# Patient Record
Sex: FEMALE | Race: WHITE | NOT HISPANIC OR LATINO | Employment: FULL TIME | URBAN - METROPOLITAN AREA
[De-identification: names, ages, dates, MRNs, and addresses within clinical notes are randomized per-mention and may not be internally consistent; named-entity substitution may affect disease eponyms.]

---

## 2017-11-01 ENCOUNTER — OFFICE VISIT (OUTPATIENT)
Dept: OBSTETRICS AND GYNECOLOGY | Facility: CLINIC | Age: 62
End: 2017-11-01
Payer: COMMERCIAL

## 2017-11-01 ENCOUNTER — PATIENT MESSAGE (OUTPATIENT)
Dept: GASTROENTEROLOGY | Facility: CLINIC | Age: 62
End: 2017-11-01

## 2017-11-01 VITALS
BODY MASS INDEX: 25.91 KG/M2 | HEIGHT: 66 IN | WEIGHT: 161.25 LBS | DIASTOLIC BLOOD PRESSURE: 78 MMHG | SYSTOLIC BLOOD PRESSURE: 124 MMHG

## 2017-11-01 DIAGNOSIS — Z12.11 SCREEN FOR COLON CANCER: ICD-10-CM

## 2017-11-01 DIAGNOSIS — D28.0 ANGIOKERATOMA OF VULVA: ICD-10-CM

## 2017-11-01 DIAGNOSIS — Z12.31 VISIT FOR SCREENING MAMMOGRAM: ICD-10-CM

## 2017-11-01 DIAGNOSIS — Z01.419 ENCOUNTER FOR GYNECOLOGICAL EXAMINATION: Primary | ICD-10-CM

## 2017-11-01 DIAGNOSIS — N95.0 POSTMENOPAUSAL BLEEDING: ICD-10-CM

## 2017-11-01 DIAGNOSIS — Z01.818 PREOPERATIVE EXAMINATION: ICD-10-CM

## 2017-11-01 LAB
CTP QC/QA: YES
FECAL OCCULT BLOOD, POC: NEGATIVE

## 2017-11-01 PROCEDURE — 99999 PR PBB SHADOW E&M-EST. PATIENT-LVL III: CPT | Mod: PBBFAC,,, | Performed by: OBSTETRICS & GYNECOLOGY

## 2017-11-01 PROCEDURE — 82270 OCCULT BLOOD FECES: CPT | Mod: S$GLB,,, | Performed by: OBSTETRICS & GYNECOLOGY

## 2017-11-01 PROCEDURE — 99396 PREV VISIT EST AGE 40-64: CPT | Mod: S$GLB,,, | Performed by: OBSTETRICS & GYNECOLOGY

## 2017-11-01 RX ORDER — MELOXICAM 15 MG/1
15 TABLET ORAL DAILY
COMMUNITY
End: 2018-04-26

## 2017-11-01 RX ORDER — EPINEPHRINE 0.22MG
100 AEROSOL WITH ADAPTER (ML) INHALATION DAILY
COMMUNITY

## 2017-11-01 NOTE — PROGRESS NOTES
Subjective:       Patient ID: Polina Griffin is a 61 y.o. female.    Chief Complaint:  Well Woman (last pap and HPV negative 16, last mammogram wnl 16, last DEXA Osteopenia T-score Spine: -1.1 Hip -1.8  16, last colonoscopy wnl per pt )      History of Present Illness.  Polina Griffin is a 61 y.o. female.  She has no breast or urinary symptoms.  She has no postcoital bleeding, pelvic pain or vaginal discharge.  She had bright red spotting for 2 days last week.  No previous episode.  May have come from veins on vulva per patient.  Also says she has bloating sometimes after eating.  Has IBS and takes a probiotic.    GYN & OB History  No LMP recorded. Patient is postmenopausal.   Pap: 2016 Normal HPV negative  Mammogram: 16 Normal  Colonoscopy:  Normal  DEXA: 16 spine -1.1 hip -1.8    OB History    Para Term  AB Living   2 2 1 1   2   SAB TAB Ectopic Multiple Live Births           2      # Outcome Date GA Lbr Cm/2nd Weight Sex Delivery Anes PTL Lv   2 Term 80 40w0d  3.232 kg (7 lb 2 oz) M Vag-Spont  N ESPINOZA   1  78 34w0d  1.956 kg (4 lb 5 oz) F Vag-Spont  Y ESPINOZA          Past Medical History:   Diagnosis Date    Abnormal Pap smear of cervix ,    LEEP    Anemia     in her 20's    Fibromatosis of plantar fascia     Irritable bowel syndrome (IBS)     Ocular migraine     Tinnitus     Urge incontinence      Past Surgical History:   Procedure Laterality Date    APPENDECTOMY N/A     CERVICAL BIOPSY  W/ LOOP ELECTRODE EXCISION N/A     ENDOMETRIAL BIOPSY N/A     TUBAL LIGATION Bilateral     Turbinate Reduction N/A     WISDOM TOOTH EXTRACTION Bilateral 1973     Family History   Problem Relation Age of Onset    Stroke Father     Hypertension Father     Coronary artery disease Father     Heart attack Father     Hyperlipidemia Father     Cancer Father     COPD Mother     Hyperlipidemia Mother     Hypertension Mother      Coronary artery disease Mother     Diabetes Brother     Ovarian cancer Neg Hx     Colon cancer Neg Hx     Breast cancer Neg Hx      Social History   Substance Use Topics    Smoking status: Never Smoker    Smokeless tobacco: Never Used    Alcohol use No       Current Outpatient Prescriptions:     CALCIUM PHOSPHATE TRIB/VIT D3 (CITRACAL + D3, CALCIUM PHOS, ORAL), Take by mouth., Disp: , Rfl:     coenzyme Q10 (COQ-10) 100 mg capsule, Take 100 mg by mouth once daily., Disp: , Rfl:     fluticasone (FLONASE) 50 mcg/actuation nasal spray, 1 spray by Each Nare route once daily., Disp: , Rfl:     ibuprofen (ADVIL,MOTRIN) 200 MG tablet, Take 400 mg by mouth every 6 (six) hours as needed for Pain., Disp: , Rfl:     LACTOBACILLUS ACIDOPHILUS (PROBIOTIC ORAL), Take by mouth., Disp: , Rfl:     meloxicam (MOBIC) 15 MG tablet, Take 15 mg by mouth once daily., Disp: , Rfl:     multivitamin capsule, Take 1 capsule by mouth once daily., Disp: , Rfl:     pseudoephedrine (SUDAFED) 30 MG tablet, Take 30 mg by mouth every 4 (four) hours as needed for Congestion., Disp: , Rfl:     VIT A/VIT C/VIT E/ZINC/COPPER (ICAPS AREDS ORAL), Take by mouth., Disp: , Rfl:     Review of patient's allergies indicates:   Allergen Reactions    Amoxicillin Rash       Review of Systems  Review of Systems   Constitutional: Negative for fatigue.   HENT: Negative for trouble swallowing.    Eyes: Negative for visual disturbance.   Respiratory: Negative for cough and shortness of breath.    Cardiovascular: Negative for chest pain.   Gastrointestinal: Negative for abdominal distention, abdominal pain, blood in stool, nausea and vomiting.   Genitourinary: Negative for difficulty urinating, dyspareunia, dysuria, flank pain, frequency, hematuria, pelvic pain, urgency, vaginal bleeding, vaginal discharge and vaginal pain.   Musculoskeletal: Negative for arthralgias.   Skin: Negative for rash.   Neurological: Negative for dizziness and headaches.  "  Psychiatric/Behavioral: Negative for sleep disturbance. The patient is not nervous/anxious.         Objective:     Vitals:    11/01/17 0819   BP: 124/78   Weight: 73.1 kg (161 lb 4.3 oz)   Height: 5' 6" (1.676 m)   PainSc: 0-No pain     Body mass index is 26.03 kg/m².    Physical Exam:   Constitutional: She is oriented to person, place, and time. Vital signs are normal. She appears well-developed and well-nourished.    HENT:   Head: Normocephalic.     Neck: Normal range of motion. No thyromegaly present.     Pulmonary/Chest: Right breast exhibits no mass, no nipple discharge, no skin change, no tenderness and no swelling. Left breast exhibits no mass, no nipple discharge, no skin change, no tenderness and no swelling. Breasts are symmetrical.        Abdominal: Soft. Normal appearance and bowel sounds are normal. She exhibits no distension. There is no tenderness.     Genitourinary: Rectum normal, vagina normal and uterus normal. Rectal exam shows guaiac negative stool. Guaiac negative stool. Pelvic exam was performed with patient supine. There is no rash, tenderness, lesion or injury on the right labia. There is no rash, tenderness, lesion or injury on the left labia. Cervix is normal. Right adnexum displays no mass, no tenderness and no fullness. Left adnexum displays no mass, no tenderness and no fullness. No erythema in the vagina. No vaginal discharge found. Cervix exhibits no motion tenderness and no discharge.   Genitourinary Comments: Multiple purple nodules on labia majora bilaterally           Musculoskeletal: Normal range of motion.      Lymphadenopathy:        Right: No inguinal and no supraclavicular adenopathy present.        Left: No inguinal and no supraclavicular adenopathy present.    Neurological: She is alert and oriented to person, place, and time.    Skin: Skin is warm and dry.    Psychiatric: She has a normal mood and affect.        Assessment/ Plan:     Encounter for gynecological " examination    Visit for screening mammogram  -     Mammo Digital Screening Bilat with Tomosynthesis CAD; Future; Expected date: 11/01/2017    Screen for colon cancer  -     POCT occult blood stool    Preoperative examination    Postmenopausal bleeding  -     US Pelvis Comp with Transvag NON-OB (xpd; Future; Expected date: 11/01/2017    Angiokeratoma of vulva        Routine pap smears.  Self breast exam and mammography discussed  Routine colonoscopy discussed.  Diet and exercise discussed.  Recommend calcium 1200 mg and vitamin D 600 units daily and routine bone mineral density testing.  Yearly influenza vaccination discussed.  Follow-up with me for U/S

## 2017-11-08 ENCOUNTER — TELEPHONE (OUTPATIENT)
Dept: ENDOSCOPY | Facility: HOSPITAL | Age: 62
End: 2017-11-08

## 2017-11-08 ENCOUNTER — OFFICE VISIT (OUTPATIENT)
Dept: GASTROENTEROLOGY | Facility: CLINIC | Age: 62
End: 2017-11-08
Payer: COMMERCIAL

## 2017-11-08 VITALS
HEIGHT: 66 IN | BODY MASS INDEX: 25.86 KG/M2 | SYSTOLIC BLOOD PRESSURE: 154 MMHG | WEIGHT: 160.94 LBS | DIASTOLIC BLOOD PRESSURE: 72 MMHG | HEART RATE: 87 BPM

## 2017-11-08 DIAGNOSIS — Z12.11 SCREEN FOR COLON CANCER: ICD-10-CM

## 2017-11-08 DIAGNOSIS — R14.0 ABDOMINAL BLOATING: ICD-10-CM

## 2017-11-08 DIAGNOSIS — K58.2 IRRITABLE BOWEL SYNDROME WITH BOTH CONSTIPATION AND DIARRHEA: Primary | ICD-10-CM

## 2017-11-08 DIAGNOSIS — Z12.11 SPECIAL SCREENING FOR MALIGNANT NEOPLASMS, COLON: Primary | ICD-10-CM

## 2017-11-08 DIAGNOSIS — R14.0 ABDOMINAL DISTENSION: ICD-10-CM

## 2017-11-08 PROCEDURE — 99204 OFFICE O/P NEW MOD 45 MIN: CPT | Mod: S$GLB,,, | Performed by: NURSE PRACTITIONER

## 2017-11-08 PROCEDURE — 99999 PR PBB SHADOW E&M-EST. PATIENT-LVL IV: CPT | Mod: PBBFAC,,, | Performed by: NURSE PRACTITIONER

## 2017-11-08 RX ORDER — POLYETHYLENE GLYCOL 3350, SODIUM SULFATE ANHYDROUS, SODIUM BICARBONATE, SODIUM CHLORIDE, POTASSIUM CHLORIDE 236; 22.74; 6.74; 5.86; 2.97 G/4L; G/4L; G/4L; G/4L; G/4L
4 POWDER, FOR SOLUTION ORAL ONCE
Qty: 4000 ML | Refills: 0 | Status: SHIPPED | OUTPATIENT
Start: 2017-11-08 | End: 2017-11-08

## 2017-11-08 NOTE — PATIENT INSTRUCTIONS
Try over the counter colace or miralax as needed for intermittent constipation. You may take either one daily if needed.     .

## 2017-11-08 NOTE — PROGRESS NOTES
"    [facility] Note    Reason for Visit:  The primary encounter diagnosis was Irritable bowel syndrome with both constipation and diarrhea. Diagnoses of Abdominal bloating, Abdominal distension, and Screen for colon cancer were also pertinent to this visit.    PCP:   Primary Doctor No       Referring MD:  Aaareferral Self  No address on file    HPI:  This is a 61 y.o. female here for evaluation of IBS-D  Since high school  Lately with 1 bristol type 4 or 5 BM/day. If stress or anxious (ieIf has to go to MD or social gathering)   will have 4-5 bristol type 6 BM/day-less than weekly.  Also with straining for bristol type 1 stools every few days x a few months.   Takes daily probiotics x a few years, and started a different brand (containing "digestive enzymes") x 3 months with noticeable improvement in loose, urgent stools     Abdominal pain   ONSET:since HS with worsening in the last few months  LOCATION:generalized  DURATION:intermitttent daily  CHARACTER:bloating  ASSOCIATED/ALLEVIATING/AGGRAVAITING:no n/v/fevers. + associated distention. + gas when passing hard stools. Worse after meals. Not better with BMs. Not r/t movement. Has not tried anything for it. Has to eat small meals to avoid "feeling sick" since HS.   RADIATION:none  TEMPORAL:worse immediately after meals. Lasts for a couple of hours  SEVERITY:3/10    Reflux - Hx mild acid reflux well controlled with GERD healthy lifestyle and previously taking PRN Zantac.   Dysphagia/odynophagia - no   GI bleeding - denies hematochezia, hematemesis, melena, BRBPR, black/tarry stools, and coffee ground emesis  NSAID usage - 15 mg mobic once monthly for plantar fasciitis. 400 mg BID for joint pain.     ROS:  Constitutional: No fevers, no chills, No unintentional weight loss, + fatigue-states she does not sleep well d/t snoring. Also increased vitamin D with some improvement,   ENT: No allergies  CV: No chest pain, no palpitations, no perif. edema, " no sob on exertion  Pulm: No cough, No shortness of breath, no wheezes, no sputum  Ophtho: No vision changes  GI: see HPI; also no nausea, no vomiting, no change in appetite  Derm: No rash  Heme: No lymphadenopathy, No bruising  MSK: + joint pain, no muscle pain, no muscle weakness  : No dysuria, No hematuria  Endo: No hot or cold intolerance  Neuro: No syncope, No seizure,       Medical History:  has a past medical history of Abnormal Pap smear of cervix (2011,2013); Anemia; Fibromatosis of plantar fascia; Irritable bowel syndrome (IBS); Ocular migraine; Tinnitus; and Urge incontinence.    Surgical History:  has a past surgical history that includes [place] tooth extraction (Bilateral, 1973); Tubal ligation (Bilateral, 1980); Cervical biopsy w/ loop electrode excision (N/A, 2011); Turbinate Reduction (N/A, 2015); Appendectomy (N/A, 2008); and Endometrial biopsy (N/A).    Family History: family history includes COPD in her mother; Cancer in her father; Coronary artery disease in her father and mother; Diabetes in her brother; Heart attack in her father; Hyperlipidemia in her father and mother; Hypertension in her father and mother; Stroke in her father..     Social History:  reports that she has never smoked. She has never used smokeless tobacco. She reports that she does not drink alcohol or use drugs.    Review of patient's allergies indicates:   Allergen Reactions    Amoxicillin Rash       Current Outpatient Prescriptions   Medication Sig    CALCIUM PHOSPHATE TRIB/VIT D3 (CITRACAL + D3, CALCIUM PHOS, ORAL) Take by mouth.    coenzyme Q10 (COQ-10) 100 mg capsule Take 100 mg by mouth once daily.    fluticasone (FLONASE) 50 mcg/actuation nasal spray 1 spray by Each Nare route once daily.    ibuprofen (ADVIL,MOTRIN) 200 MG tablet Take 400 mg by mouth every 6 (six) hours as needed for Pain.    LACTOBACILLUS ACIDOPHILUS (PROBIOTIC ORAL) Take by mouth.    MELATONIN (MELATIN ORAL) Take by mouth.    meloxicam  "(MOBIC) 15 MG tablet Take 15 mg by mouth once daily.    multivitamin capsule Take 1 capsule by mouth once daily.    pseudoephedrine (SUDAFED) 30 MG tablet Take 30 mg by mouth every 4 (four) hours as needed for Congestion.    VIT A/VIT C/VIT E/ZINC/COPPER (ICAPS AREDS ORAL) Take by mouth.     No current facility-administered medications for this visit.        Objective Findings:    Vital Signs:  BP (!) 154/72   Pulse 87   Ht 5' 6" (1.676 m)   Wt 73 kg (160 lb 15 oz)   BMI 25.98 kg/m²   Body mass index is 25.98 kg/m².    Physical Exam:  General Appearance: Well appearing in no acute distress  Head:   Normocephalic, without obvious abnormality  Eyes:    No scleral icterus, EOMI  ENT: Neck supple, Lips, mucosa, and tongue normal; teeth and gums normal  Lungs: CTA bilaterally in anterior and posterior fields, no wheezes, no crackles.  Heart:  Regular rate and rhythm, S1, S2 normal, no murmurs heard  Abdomen: Soft, non tender, non distended with positive bowel sounds in all four quadrants. No hepatosplenomegaly, ascites, or mass  Extremities: 2+ radial pulses, no clubbing, cyanosis or edema  Skin: No rash to exposed areas  Neurologic: A&Ox4      Labs:  No results found for: WBC, HGB, HCT, PLT, CHOL, TRIG, HDL, LDLDIRECT, ALT, AST, NA, K, CL, CREATININE, BUN, CO2, TSH, PSA, INR, GLUF, HGBA1C, MICROALBUR    Endoscopy:    Per pt, colonoscopy 10 years ago in [place]-WNL  Per pt EGD 20 years ago-WNL  Per pt, 3-4 colonoscopies prior to this and proctoscopes in high school- all normal.     Assessment:61 y.o. Female w/ long h/o IBS with worsening in the last few months after starting new probiotic w/ digestive enzymes. Increased bloating, gas, constipation x a few days weekly. Daily NSAID use. Last colonoscopy 10 years ago. Never had polyps.   1. Irritable bowel syndrome with both constipation and diarrhea    2. Abdominal bloating    3. Abdominal distension    4. Screen for colon cancer           Recommendations:  1,2,3 SIBO " send out testing. Miralax or colace PRN for constipation. EGD r/o PUD, gastritis, h. Pylori. May have to hold new probiotic and see if s/s get better. May have to consider non NSAID therapy for joint pain.  4. Screening colonoscopy ordered.     Return in about 2 months (around 1/8/2018) for ibs.      Order summary:  Orders Placed This Encounter    Case request GI: ESOPHAGOGASTRODUODENOSCOPY (EGD), COLONOSCOPY         Thank you so much for allowing me to participate in the care of Polina Kruger, APRN, FNP-C

## 2017-11-15 ENCOUNTER — APPOINTMENT (OUTPATIENT)
Dept: RADIOLOGY | Facility: OTHER | Age: 62
End: 2017-11-15
Attending: OBSTETRICS & GYNECOLOGY
Payer: COMMERCIAL

## 2017-11-15 ENCOUNTER — OFFICE VISIT (OUTPATIENT)
Dept: OBSTETRICS AND GYNECOLOGY | Facility: CLINIC | Age: 62
End: 2017-11-15
Payer: COMMERCIAL

## 2017-11-15 VITALS
BODY MASS INDEX: 26.56 KG/M2 | DIASTOLIC BLOOD PRESSURE: 76 MMHG | HEIGHT: 66 IN | SYSTOLIC BLOOD PRESSURE: 144 MMHG | WEIGHT: 165.25 LBS

## 2017-11-15 VITALS — WEIGHT: 165 LBS | HEIGHT: 66 IN | BODY MASS INDEX: 26.52 KG/M2

## 2017-11-15 DIAGNOSIS — Z12.31 VISIT FOR SCREENING MAMMOGRAM: ICD-10-CM

## 2017-11-15 DIAGNOSIS — N95.0 POSTMENOPAUSAL BLEEDING: ICD-10-CM

## 2017-11-15 PROCEDURE — 88305 TISSUE EXAM BY PATHOLOGIST: CPT | Performed by: PATHOLOGY

## 2017-11-15 PROCEDURE — 99999 PR PBB SHADOW E&M-EST. PATIENT-LVL III: CPT | Mod: PBBFAC,,, | Performed by: OBSTETRICS & GYNECOLOGY

## 2017-11-15 PROCEDURE — 58100 BIOPSY OF UTERUS LINING: CPT | Mod: S$GLB,,, | Performed by: OBSTETRICS & GYNECOLOGY

## 2017-11-15 PROCEDURE — 77067 SCR MAMMO BI INCL CAD: CPT | Mod: 26,,, | Performed by: RADIOLOGY

## 2017-11-15 PROCEDURE — 99214 OFFICE O/P EST MOD 30 MIN: CPT | Mod: 25,S$GLB,, | Performed by: OBSTETRICS & GYNECOLOGY

## 2017-11-15 PROCEDURE — 88305 TISSUE EXAM BY PATHOLOGIST: CPT | Mod: 26,,, | Performed by: PATHOLOGY

## 2017-11-15 PROCEDURE — 77063 BREAST TOMOSYNTHESIS BI: CPT | Mod: 26,,, | Performed by: RADIOLOGY

## 2017-11-15 PROCEDURE — 77067 SCR MAMMO BI INCL CAD: CPT | Mod: TC,PN

## 2017-11-15 NOTE — PROCEDURES
Procedures   Procedure:  Endometrial biopsy    Diagnosis: PMB    The risks, benefits, and alternatives were discussed prior to the procedure. Patient signed consents.      Procedure note:  The speculum was placed and betadine was applied to the cervix.  The anterior lip of the cervix was grasped with a single tooth tenaculum.  The endometrial pipelle was passed and a fair amount of tissue was obtained.  The instruments were removed. Silver nitrate was applied for hemostasis.  The specimen was sent to pathology for evaluation.  There were no complications.    Assessment and Plan:  Postmenopausal bleeding  -     US Pelvis Comp with Transvag NON-OB (xpd; Future; Expected date: 11/15/2017      Ibuprofen 600 mg every 6 hours prn cramping.  Call the office for heavy vaginal bleeding, fever, nausea, vomiting, or severe lower abdominal pain.

## 2017-11-15 NOTE — PROGRESS NOTES
Polina Griffin is a 61 y.o.  here for follow-up of postmenopausal vaginal bleeding.  She had bright red spotting for 2 days last week.  No previous episode.  She denies pelvic pain and  denies urinary symptoms.    Past Medical History:   Diagnosis Date    Abnormal Pap smear of cervix ,     LEEP    Anemia     in her 20's    Esophageal reflux     Fibroids     Fibromatosis of plantar fascia     Insomnia     Irritable bowel syndrome (IBS)     Ocular migraine     Tinnitus     Urge incontinence      Past Surgical History:   Procedure Laterality Date    APPENDECTOMY      CERVICAL BIOPSY  W/ LOOP ELECTRODE EXCISION      ENDOMETRIAL BIOPSY      NASAL TURBINATE REDUCTION  2015    TUBAL LIGATION Bilateral 1980    WISDOM TOOTH EXTRACTION Bilateral 1973     Social History   Substance Use Topics    Smoking status: Never Smoker    Smokeless tobacco: Never Used    Alcohol use No     Family History   Problem Relation Age of Onset    Stroke Father     Hypertension Father     Coronary artery disease Father     Heart attack Father     Hyperlipidemia Father     Cancer Father     Prostate cancer Father     COPD Mother     Hyperlipidemia Mother     Hypertension Mother     Coronary artery disease Mother     Diabetes Brother     Ovarian cancer Neg Hx     Colon cancer Neg Hx     Breast cancer Neg Hx     Crohn's disease Neg Hx     Ulcerative colitis Neg Hx     Stomach cancer Neg Hx     Esophageal cancer Neg Hx     Irritable bowel syndrome Neg Hx     Celiac disease Neg Hx     Rectal cancer Neg Hx      OB History    Para Term  AB Living   2 2 1 1   2   SAB TAB Ectopic Multiple Live Births           2      # Outcome Date GA Lbr Cm/2nd Weight Sex Delivery Anes PTL Lv   2 Term 80 40w0d  3.232 kg (7 lb 2 oz) M Vag-Spont EPI N ESPINOZA   1  78 34w0d  1.956 kg (4 lb 5 oz) F Vag-Spont EPI Y ESPINOZA      Obstetric Comments   Menarche 13       Review of  "Systems:  General: No fever, chills, fatigue or weight loss.  Chest: No chest pain, shortness of breath, or palpitations.  Breast: No pain, masses, or nipple discharge.  Vulva: No pain, lesions, or itching.  Vagina: No relaxation, pain, itching, or lesions.  Abdomen: No pain, nausea, vomiting, diarrhea, or constipation.  Urinary: No incontinence, nocturia, frequency, or dysuria.  Extremities:  No leg cramps, edema, or calf pain.  Neurologic: No headaches, dizziness, or visual changes.    Vitals:  Vitals:    11/15/17 1334   BP: (!) 144/76   Weight: 75 kg (165 lb 3.8 oz)   Height: 5' 6" (1.676 m)   PainSc: 0-No pain     Body mass index is 26.67 kg/m².    Physical exam:   External genitalia:  No lesions, erythema, rashes, or other abnormalities.  Urethra:  No lesions or discharge.  Vagina:  No lesions, discharge, or tenderness.   Cervix:  No lesions, discharge, or cervical motion tenderness.   Uterus:  Normal in size and shape.  Mobile and nontender.   Adnexa:  No masses or tenderness.  Transabdominal and transvaginal ultrasound of the pelvis with color flow Doppler evaluation of the ovaries:  11/15/17  COMPARISON:  05/24/2016.  FINDINGS:  Uterus measures 5.6 x 4.9 x 4.5 cm and is retroverted.  At least 3 uterine fibroids are identified, measuring 3.7 x 3.1 x 2.6 cm at the right lateral fundus, 3.6 x 3.4 x 2.9 cm at the left lateral fundus, and 1.5 x 1.4 x 0.9 cm at the anterior fundus. The endometrium is poorly visualized/partially obscured but was measured up to 7 mm in thickness.  The ovaries are normal in size and appearance.  Right ovary measures 2.0 x 1.6 x 1.0 cm.  Left ovary measures 2.1 x 1.8 x 1.0 cm. Flow to the right ovary was normal blood flow to the left ovary was difficult to characterize due to technical limitations reported by the sonographer.  There is no evidence of free fluid within the pelvis.   Impression         Bulky fibroid uterus with partial obscuration of the endometrium, suggesting the " possibility of a submucosal component in this patient with postmenopausal bleeding. Endometrium is mildly prominent at 7 mm where it could be measured.  Further evaluation with endometrial sampling could be considered based on clinical findings       Assessment and Plan:  Postmenopausal bleeding  -     US Pelvis Comp with Transvag NON-OB (xpd; Future; Expected date: 11/15/2017        Pelvic ultrasound ordered.   The endometrial stripe is 7 mm.  The risks/benefits, and alternatives were discussed.  The patient desires EMB  EMB done see note.  F/U 3 months for U/S  FOLLOW UP: PRN lack of improvement.

## 2017-12-11 ENCOUNTER — HOSPITAL ENCOUNTER (OUTPATIENT)
Facility: HOSPITAL | Age: 62
Discharge: HOME | End: 2017-12-11
Attending: INTERNAL MEDICINE | Admitting: INTERNAL MEDICINE
Payer: COMMERCIAL

## 2017-12-11 ENCOUNTER — ANESTHESIA (OUTPATIENT)
Dept: ENDOSCOPY | Facility: HOSPITAL | Age: 62
End: 2017-12-11
Payer: COMMERCIAL

## 2017-12-11 ENCOUNTER — ANESTHESIA EVENT (OUTPATIENT)
Dept: ENDOSCOPY | Facility: HOSPITAL | Age: 62
End: 2017-12-11
Payer: COMMERCIAL

## 2017-12-11 ENCOUNTER — SURGERY (OUTPATIENT)
Age: 62
End: 2017-12-11

## 2017-12-11 VITALS
BODY MASS INDEX: 25.71 KG/M2 | HEIGHT: 66 IN | RESPIRATION RATE: 18 BRPM | DIASTOLIC BLOOD PRESSURE: 70 MMHG | OXYGEN SATURATION: 100 % | TEMPERATURE: 98 F | SYSTOLIC BLOOD PRESSURE: 158 MMHG | HEART RATE: 67 BPM | WEIGHT: 160 LBS

## 2017-12-11 DIAGNOSIS — R14.0 ABDOMINAL BLOATING: Primary | ICD-10-CM

## 2017-12-11 PROCEDURE — 27201089 HC SNARE, DISP (ANY): Performed by: INTERNAL MEDICINE

## 2017-12-11 PROCEDURE — 45385 COLONOSCOPY W/LESION REMOVAL: CPT | Mod: 33,,, | Performed by: INTERNAL MEDICINE

## 2017-12-11 PROCEDURE — 88305 TISSUE EXAM BY PATHOLOGIST: CPT | Performed by: PATHOLOGY

## 2017-12-11 PROCEDURE — 63600175 PHARM REV CODE 636 W HCPCS: Performed by: NURSE ANESTHETIST, CERTIFIED REGISTERED

## 2017-12-11 PROCEDURE — C1773 RET DEV, INSERTABLE: HCPCS | Performed by: INTERNAL MEDICINE

## 2017-12-11 PROCEDURE — 25000003 PHARM REV CODE 250: Performed by: INTERNAL MEDICINE

## 2017-12-11 PROCEDURE — 27201012 HC FORCEPS, HOT/COLD, DISP: Performed by: INTERNAL MEDICINE

## 2017-12-11 PROCEDURE — 43239 EGD BIOPSY SINGLE/MULTIPLE: CPT | Mod: 51,,, | Performed by: INTERNAL MEDICINE

## 2017-12-11 PROCEDURE — 37000009 HC ANESTHESIA EA ADD 15 MINS: Performed by: INTERNAL MEDICINE

## 2017-12-11 PROCEDURE — 43239 EGD BIOPSY SINGLE/MULTIPLE: CPT | Performed by: INTERNAL MEDICINE

## 2017-12-11 PROCEDURE — 45385 COLONOSCOPY W/LESION REMOVAL: CPT | Performed by: INTERNAL MEDICINE

## 2017-12-11 PROCEDURE — D9220A PRA ANESTHESIA: Mod: 33,ANES,, | Performed by: ANESTHESIOLOGY

## 2017-12-11 PROCEDURE — 37000008 HC ANESTHESIA 1ST 15 MINUTES: Performed by: INTERNAL MEDICINE

## 2017-12-11 PROCEDURE — 88305 TISSUE EXAM BY PATHOLOGIST: CPT | Mod: 26,,, | Performed by: PATHOLOGY

## 2017-12-11 PROCEDURE — D9220A PRA ANESTHESIA: Mod: 33,CRNA,, | Performed by: NURSE ANESTHETIST, CERTIFIED REGISTERED

## 2017-12-11 RX ORDER — SODIUM CHLORIDE 9 MG/ML
INJECTION, SOLUTION INTRAVENOUS CONTINUOUS
Status: DISCONTINUED | OUTPATIENT
Start: 2017-12-11 | End: 2017-12-11 | Stop reason: HOSPADM

## 2017-12-11 RX ORDER — PROPOFOL 10 MG/ML
VIAL (ML) INTRAVENOUS CONTINUOUS PRN
Status: DISCONTINUED | OUTPATIENT
Start: 2017-12-11 | End: 2017-12-11

## 2017-12-11 RX ORDER — LIDOCAINE HCL/PF 100 MG/5ML
SYRINGE (ML) INTRAVENOUS
Status: DISCONTINUED | OUTPATIENT
Start: 2017-12-11 | End: 2017-12-11

## 2017-12-11 RX ORDER — PROPOFOL 10 MG/ML
VIAL (ML) INTRAVENOUS
Status: DISCONTINUED | OUTPATIENT
Start: 2017-12-11 | End: 2017-12-11

## 2017-12-11 RX ADMIN — PROPOFOL 200 MCG/KG/MIN: 10 INJECTION, EMULSION INTRAVENOUS at 09:12

## 2017-12-11 RX ADMIN — PROPOFOL 100 MG: 10 INJECTION, EMULSION INTRAVENOUS at 09:12

## 2017-12-11 RX ADMIN — LIDOCAINE HYDROCHLORIDE 100 MG: 20 INJECTION, SOLUTION INTRAVENOUS at 09:12

## 2017-12-11 RX ADMIN — SODIUM CHLORIDE: 0.9 INJECTION, SOLUTION INTRAVENOUS at 08:12

## 2017-12-11 NOTE — H&P
Short Stay Endoscopy History and Physical    PCP - Primary Doctor No    Procedure - EGD and Colonoscopy  Sedation: GA  ASA - per anesthesia  Mallampati - per anesthesia  History of Anesthesia problems - no  Family history Anesthesia problems -  no     HPI:  This is a 62 y.o. female here for evaluation of : Abdominal bloating, Screening for CRC    Reflux - no  Dysphagia - no  Abdominal pain - yes  Diarrhea - no    ROS:  Constitutional: No fevers, chills, No weight loss  ENT: No allergies  CV: No chest pain  Pulm: No cough, No shortness of breath  Ophtho: No vision changes  GI: see HPI  Medical History:  has a past medical history of Abnormal Pap smear of cervix (2011, 2013); Anemia; Esophageal reflux; Fibroids; Fibromatosis of plantar fascia; Insomnia; Irritable bowel syndrome (IBS); Ocular migraine; Tinnitus; and Urge incontinence.    Surgical History:  has a past surgical history that includes [place] tooth extraction (Bilateral, 1973); Tubal ligation (Bilateral, 1980); Endometrial biopsy (2010); Nasal turbinate reduction (2015); Appendectomy (2008); and Cervical biopsy w/ loop electrode excision (2011).    Family History: family history includes COPD in her mother; Cancer in her father; Coronary artery disease in her father and mother; Diabetes in her brother; Heart attack in her father; Hyperlipidemia in her father and mother; Hypertension in her father and mother; Prostate cancer in her father; Stroke in her father.. Otherwise no colon cancer, inflammatory bowel disease, or GI malignancies.    Social History:  reports that she has never smoked. She has never used smokeless tobacco. She reports that she does not drink alcohol or use drugs.    Review of patient's allergies indicates:   Allergen Reactions    Amoxicillin Rash       Medications:   Prescriptions Prior to Admission   Medication Sig Dispense Refill Last Dose    CALCIUM PHOSPHATE TRIB/VIT D3 (CITRACAL + D3, CALCIUM PHOS, ORAL) Take by mouth.   Past Week  at Unknown time    coenzyme Q10 (COQ-10) 100 mg capsule Take 100 mg by mouth once daily.   Past Week at Unknown time    fluticasone (FLONASE) 50 mcg/actuation nasal spray 1 spray by Each Nare route once daily.   Past Week at Unknown time    ibuprofen (ADVIL,MOTRIN) 200 MG tablet Take 400 mg by mouth every 6 (six) hours as needed for Pain.   Past Week at Unknown time    LACTOBACILLUS ACIDOPHILUS (PROBIOTIC ORAL) Take by mouth.   Past Week at Unknown time    MELATONIN (MELATIN ORAL) Take by mouth.   Past Week at Unknown time    multivitamin capsule Take 1 capsule by mouth once daily.   Past Week at Unknown time    pseudoephedrine (SUDAFED) 30 MG tablet Take 30 mg by mouth every 4 (four) hours as needed for Congestion.   Past Week at Unknown time    ranitidine (ZANTAC) 150 MG tablet Take 150 mg by mouth 2 (two) times daily as needed for Heartburn.   Past Week at Unknown time    vit C,F-Wh-njqtn-lutein-zeaxan (PRESERVISION AREDS 2) [id number] mg-unit-mg-mg Cap Take 1 tablet by mouth once daily.   Past Week at Unknown time    meloxicam (MOBIC) 15 MG tablet Take 15 mg by mouth once daily.   More than a month at Unknown time       Objective Findings:    Vital Signs: Per nursing notes.    Physical Exam:  General Appearance: Well appearing in no acute distress  Head:   Normocephalic, without obvious abnormality  Eyes:    No scleral icterus  Airway: Open  Neck: No restriction in mobility  Lungs: CTA bilaterally in anterior and posterior fields, no wheezes, no crackles.  Heart:  Regular rate and rhythm, S1, S2 normal, no murmurs heard  Abdomen: Soft, non tender, non distended      Labs:  No results found for: WBC, HGB, HCT, PLT, CHOL, TRIG, HDL, LDLDIRECT, ALT, AST, NA, K, CL, CREATININE, BUN, CO2, TSH, PSA, INR, GLUF, HGBA1C, MICROALBUR      I have explained the risks and benefits of endoscopy procedures to the patient including but not limited to bleeding, perforation, infection, and death.    Thank you so much  for allowing me to participate in the care of Polina Florez MD

## 2017-12-11 NOTE — ANESTHESIA POSTPROCEDURE EVALUATION
"Anesthesia Post Evaluation    Patient: Polina Griffin    Procedure(s) Performed: Procedure(s) (LRB):  ESOPHAGOGASTRODUODENOSCOPY (EGD) (N/A)  COLONOSCOPY (N/A)    Final Anesthesia Type: general  Patient location during evaluation: PACU  Patient participation: Yes- Able to Participate  Level of consciousness: awake and alert  Post-procedure vital signs: reviewed and stable  Pain management: adequate  Airway patency: patent  PONV status at discharge: No PONV  Anesthetic complications: no      Cardiovascular status: blood pressure returned to baseline  Respiratory status: unassisted  Hydration status: euvolemic  Follow-up not needed.        Visit Vitals  BP (!) 158/70 (BP Location: Left arm, Patient Position: Lying)   Pulse 67   Temp 36.7 °C (98.1 °F) (Temporal)   Resp 18   Ht 5' 6" (1.676 m)   Wt 72.6 kg (160 lb)   LMP  (LMP Unknown)   SpO2 100%   Breastfeeding? No   BMI 25.82 kg/m²       Pain/Stephanie Score: Pain Assessment Performed: Yes (12/11/2017  9:56 AM)  Presence of Pain: non-verbal indicators absent (12/11/2017  9:56 AM)  Stephanie Score: 10 (12/11/2017  9:56 AM)      "

## 2017-12-11 NOTE — ANESTHESIA PREPROCEDURE EVALUATION
12/11/2017  Polina Griffin is a 62 y.o., female.    Anesthesia Evaluation         Review of Systems  Anesthesia Hx:  No problems with previous Anesthesia   Social:  Non-Smoker    Cardiovascular:   Exercise tolerance: good Denies Hypertension.  Denies CAD.     Denies Angina.  Functional Capacity Can you climb two flights of stairs? ==> Yes    Pulmonary:   Denies Asthma.  Denies Recent URI.  Denies Sleep Apnea.    Renal/:  Renal/ Normal     Hepatic/GI:   Denies PUD. Denies Hiatal Hernia. GERD Denies Liver Disease.  Denies Hepatitis.    Neurological:   Denies CVA. Denies Seizures.    Endocrine:   Denies Diabetes. Denies Hypothyroidism.        Physical Exam  General:  Well nourished    Airway/Jaw/Neck:  Airway Findings: Mouth Opening: Normal Tongue: Normal  General Airway Assessment: Adult  Mallampati: I  TM Distance: 4 - 6 cm  Jaw/Neck Findings:  Neck ROM: Normal ROM  Neck Findings:     Eyes/Ears/Nose:  EYES/EARS/NOSE FINDINGS: Normal   Dental:  Dental Findings: In tact   Chest/Lungs:  Chest/Lungs Findings: Clear to auscultation     Heart/Vascular:  Heart Findings: Rate: Normal  Rhythm: Regular Rhythm  Sounds: Normal        Mental Status:  Mental Status Findings:  Alert and Oriented         Anesthesia Plan  Type of Anesthesia, risks & benefits discussed:  Anesthesia Type:  general, MAC  Patient's Preference: Proceed with anesthesia understanding that the risks are very small but could be serious or life threatening.  Intra-op Monitoring Plan: standard ASA monitors  Intra-op Monitoring Plan Comments:   Post Op Pain Control Plan:   Post Op Pain Control Plan Comments:   Induction:   IV  Beta Blocker:  Patient is not currently on a Beta-Blocker (No further documentation required).       Informed Consent: Patient understands risks and agrees with Anesthesia plan.  Questions answered. Anesthesia consent signed with  patient.  ASA Score: 2     Day of Surgery Review of History & Physical: I have interviewed and examined the patient. I have reviewed the patient's H&P dated:            Ready For Surgery From Anesthesia Perspective.

## 2017-12-11 NOTE — PATIENT INSTRUCTIONS
Discharge Summary/Instructions after an Endoscopic Procedure  Patient Name: Polina Griffin  Patient MRN: [id number]  Patient YOB: 1955 Monday, December 11, 2017  Angel Florez MD  RESTRICTIONS:  During your procedure today, you received medications for sedation.  These   medications may affect your judgment, balance and coordination.  Therefore,   for 24 hours, you have the following restrictions:   - DO NOT drive a car, operate machinery, make legal/financial decisions,   sign important papers or drink alcohol.    ACTIVITY:  The following day: return to full activity including work, except no heavy   lifting, straining or running for 3 days if polyps were removed.  DIET:  Eat and drink normally unless instructed otherwise.     TREATMENT FOR COMMON SIDE EFFECTS:  - Mild abdominal pain, belching, bloating or excessive gas: rest, eat   lightly and use a heating pad.  - Sore Throat: treat with throat lozenges and/or gargle with warm salt   water.  SYMPTOMS TO WATCH FOR AND REPORT TO YOUR PHYSICIAN:  1. Abdominal pain or bloating, other than gas cramps.  2. Chest pain.  3. Back pain.  4. Chills or fever occurring within 24 hours after the procedure.  5. Rectal bleeding, which would show as bright red, maroon, or black stools.   (A tablespoon of blood from the rectum is not serious, especially if   hemorrhoids are present.)  6. Vomiting.  7. Weakness or dizziness.  8. Because air was used during the procedure, expelling large amounts of air   from your rectum or belching is normal.  9. If a bowel prep was taken, you may not have a bowel movement for 1-3   days.  This is normal.  GO DIRECTLY TO THE NEAREST EMERGENCY ROOM IF YOU HAVE ANY OF THE FOLLOWING:      Difficulty breathing  Chills and/or fever over 101 F   Persistent vomiting and/or vomiting blood   Severe abdominal pain   Severe chest pain   Black, tarry stools   Bleeding- more than one tablespoon   Any other symptom or condition that you may feel  needs urgent attention  Your doctor recommends these additional instructions:  If any biopsies were taken, your doctor s clinic will contact you in 1 to 2   weeks with any results.  You have a contact number available for emergencies.  The signs and symptoms   of potential delayed complications were discussed with you.  You may return   to normal activities tomorrow.  Written discharge instructions were   provided to you.   You are being discharged to home.   Resume your previous diet.   Continue your present medications.   We are waiting for your pathology results.  For questions, problems or results please call your physician - Angel Florez MD at Work:  [phone].  [facility], EMERGENCY ROOM PHONE NUMBER: [phone]  IF A COMPLICATION OR EMERGENCY SITUATION ARISES AND YOU ARE UNABLE TO REACH   YOUR PHYSICIAN - GO DIRECTLY TO THE EMERGENCY ROOM.  Angel Florez MD  12/11/2017 9:30:14 AM  This report has been verified and signed electronically.

## 2017-12-11 NOTE — TRANSFER OF CARE
"Anesthesia Transfer of Care Note    Patient: Polina Griffin    Procedure(s) Performed: Procedure(s) (LRB):  ESOPHAGOGASTRODUODENOSCOPY (EGD) (N/A)  COLONOSCOPY (N/A)    Patient location: PACU    Anesthesia Type: general    Transport from OR: Transported from OR on room air with adequate spontaneous ventilation    Post pain: adequate analgesia    Post assessment: no apparent anesthetic complications    Post vital signs: stable    Level of consciousness: awake    Nausea/Vomiting: no nausea/vomiting    Complications: none    Transfer of care protocol was followed      Last vitals:   Visit Vitals  /60 (BP Location: Left arm, Patient Position: Lying)   Pulse 77   Temp 36.7 °C (98.1 °F) (Temporal)   Resp 18   Ht 5' 6" (1.676 m)   Wt 72.6 kg (160 lb)   LMP  (LMP Unknown)   SpO2 98%   Breastfeeding? No   BMI 25.82 kg/m²     "

## 2017-12-11 NOTE — PATIENT INSTRUCTIONS
Discharge Summary/Instructions after an Endoscopic Procedure  Patient Name: Polina Griffin  Patient MRN: [id number]  Patient YOB: 1955 Monday, December 11, 2017  Angel Florez MD  RESTRICTIONS:  During your procedure today, you received medications for sedation.  These   medications may affect your judgment, balance and coordination.  Therefore,   for 24 hours, you have the following restrictions:   - DO NOT drive a car, operate machinery, make legal/financial decisions,   sign important papers or drink alcohol.    ACTIVITY:  The following day: return to full activity including work, except no heavy   lifting, straining or running for 3 days if polyps were removed.  DIET:  Eat and drink normally unless instructed otherwise.     TREATMENT FOR COMMON SIDE EFFECTS:  - Mild abdominal pain, belching, bloating or excessive gas: rest, eat   lightly and use a heating pad.  - Sore Throat: treat with throat lozenges and/or gargle with warm salt   water.  SYMPTOMS TO WATCH FOR AND REPORT TO YOUR PHYSICIAN:  1. Abdominal pain or bloating, other than gas cramps.  2. Chest pain.  3. Back pain.  4. Chills or fever occurring within 24 hours after the procedure.  5. Rectal bleeding, which would show as bright red, maroon, or black stools.   (A tablespoon of blood from the rectum is not serious, especially if   hemorrhoids are present.)  6. Vomiting.  7. Weakness or dizziness.  8. Because air was used during the procedure, expelling large amounts of air   from your rectum or belching is normal.  9. If a bowel prep was taken, you may not have a bowel movement for 1-3   days.  This is normal.  GO DIRECTLY TO THE NEAREST EMERGENCY ROOM IF YOU HAVE ANY OF THE FOLLOWING:      Difficulty breathing  Chills and/or fever over 101 F   Persistent vomiting and/or vomiting blood   Severe abdominal pain   Severe chest pain   Black, tarry stools   Bleeding- more than one tablespoon   Any other symptom or condition that you may feel  needs urgent attention  Your doctor recommends these additional instructions:  If any biopsies were taken, your doctor s clinic will contact you in 1 to 2   weeks with any results.  You have a contact number available for emergencies.  The signs and symptoms   of potential delayed complications were discussed with you.  You may return   to normal activities tomorrow.  Written discharge instructions were   provided to you.   You are being discharged to home.   Resume your previous diet.   Continue your present medications.   We are waiting for your pathology results.   Your physician has recommended a repeat colonoscopy in five years for   surveillance.  For questions, problems or results please call your physician - Angel Florez MD at Work:  [phone].  [facility], EMERGENCY ROOM PHONE NUMBER: [phone]  IF A COMPLICATION OR EMERGENCY SITUATION ARISES AND YOU ARE UNABLE TO REACH   YOUR PHYSICIAN - GO DIRECTLY TO THE EMERGENCY ROOM.  Angel Florez MD  12/11/2017 9:47:31 AM  This report has been verified and signed electronically.

## 2017-12-13 ENCOUNTER — TELEPHONE (OUTPATIENT)
Dept: GASTROENTEROLOGY | Facility: CLINIC | Age: 62
End: 2017-12-13

## 2017-12-14 ENCOUNTER — TELEPHONE (OUTPATIENT)
Dept: GASTROENTEROLOGY | Facility: CLINIC | Age: 62
End: 2017-12-14

## 2017-12-14 NOTE — TELEPHONE ENCOUNTER
----- Message from Angel Florez MD sent at 12/14/2017  8:46 AM CST -----  Biopsies of the stomach and small bowel are normal and the colon polyp was benign. Follow up with Brittney as previously scheduled.

## 2017-12-14 NOTE — PROGRESS NOTES
Biopsies of the stomach and small bowel are normal and the colon polyp was benign. Follow up with Brittney as previously scheduled.

## 2017-12-15 ENCOUNTER — TELEPHONE (OUTPATIENT)
Dept: GASTROENTEROLOGY | Facility: CLINIC | Age: 62
End: 2017-12-15

## 2017-12-15 NOTE — TELEPHONE ENCOUNTER
----- Message from Laura Vance MA sent at 12/14/2017  9:01 AM CST -----  Contact: Millie with [facility] [phone]  [place]  Returning a call to Tawana re: the pt's breath test. It was not until I went back to the line that she told me they do not have them and asked I relay that message. Please call Millie with [facility] [phone]

## 2017-12-15 NOTE — TELEPHONE ENCOUNTER
Spoke to pt  Did not perform breath test yet  Pt says she may not do it until after the 1st of the year

## 2017-12-18 ENCOUNTER — TELEPHONE (OUTPATIENT)
Dept: ENDOSCOPY | Facility: HOSPITAL | Age: 62
End: 2017-12-18

## 2018-04-05 ENCOUNTER — OFFICE VISIT (OUTPATIENT)
Dept: INTERNAL MEDICINE | Facility: CLINIC | Age: 63
End: 2018-04-05
Payer: COMMERCIAL

## 2018-04-05 ENCOUNTER — HOSPITAL ENCOUNTER (OUTPATIENT)
Dept: RADIOLOGY | Facility: HOSPITAL | Age: 63
Discharge: HOME | End: 2018-04-05
Attending: INTERNAL MEDICINE
Payer: COMMERCIAL

## 2018-04-05 ENCOUNTER — PATIENT MESSAGE (OUTPATIENT)
Dept: INTERNAL MEDICINE | Facility: CLINIC | Age: 63
End: 2018-04-05

## 2018-04-05 VITALS
HEART RATE: 66 BPM | DIASTOLIC BLOOD PRESSURE: 80 MMHG | WEIGHT: 165 LBS | OXYGEN SATURATION: 98 % | BODY MASS INDEX: 26.52 KG/M2 | HEIGHT: 66 IN | TEMPERATURE: 99 F | SYSTOLIC BLOOD PRESSURE: 126 MMHG

## 2018-04-05 DIAGNOSIS — R07.9 CHEST PAIN, UNSPECIFIED TYPE: ICD-10-CM

## 2018-04-05 DIAGNOSIS — R07.9 CHEST PAIN, UNSPECIFIED TYPE: Primary | ICD-10-CM

## 2018-04-05 DIAGNOSIS — K58.9 IRRITABLE BOWEL SYNDROME, UNSPECIFIED TYPE: ICD-10-CM

## 2018-04-05 PROBLEM — R14.0 ABDOMINAL BLOATING: Status: RESOLVED | Noted: 2017-12-11 | Resolved: 2018-04-05

## 2018-04-05 PROCEDURE — 3074F SYST BP LT 130 MM HG: CPT | Mod: CPTII,S$GLB,, | Performed by: INTERNAL MEDICINE

## 2018-04-05 PROCEDURE — 71046 X-RAY EXAM CHEST 2 VIEWS: CPT | Mod: TC

## 2018-04-05 PROCEDURE — 99203 OFFICE O/P NEW LOW 30 MIN: CPT | Mod: S$GLB,,, | Performed by: INTERNAL MEDICINE

## 2018-04-05 PROCEDURE — 3079F DIAST BP 80-89 MM HG: CPT | Mod: CPTII,S$GLB,, | Performed by: INTERNAL MEDICINE

## 2018-04-05 PROCEDURE — 71046 X-RAY EXAM CHEST 2 VIEWS: CPT | Mod: 26,,, | Performed by: RADIOLOGY

## 2018-04-05 PROCEDURE — 99999 PR PBB SHADOW E&M-EST. PATIENT-LVL V: CPT | Mod: PBBFAC,,, | Performed by: INTERNAL MEDICINE

## 2018-04-08 NOTE — PROGRESS NOTES
Subjective:       Patient ID: Polina Griffin is a 62 y.o. female.    Chief Complaint: Establish Care    HPI  She is here for an initial visit.  She complains of occasional episodes of pain located in her chest.  No shortness of breath.  No palpitations.  Currently asymptomatic.  3 out of 10 in intensity.  Described as an ache.  Has been present intermittently for over a week.    Past medical history: Abnormal Pap smear, anemia, status post appendectomy, tubal ligation.  She had a colonoscopy December 2017    Medications: None    ALLERGIES: Penicillin    Family history: Coronary artery disease, father had prostate cancer      Review of Systems   Constitutional: Negative for chills, fatigue, fever and unexpected weight change.   Respiratory: Negative for chest tightness and shortness of breath.    Cardiovascular: Negative for chest pain and palpitations.   Gastrointestinal: Negative for abdominal pain and blood in stool.   Neurological: Negative for dizziness, syncope, numbness and headaches.       Objective:      Physical Exam   HENT:   Right Ear: External ear normal.   Left Ear: External ear normal.   Nose: Nose normal.   Mouth/Throat: Oropharynx is clear and moist.   Eyes: Pupils are equal, round, and reactive to light.   Neck: Normal range of motion.   Cardiovascular: Normal rate and regular rhythm.    No murmur heard.  Pulmonary/Chest: Breath sounds normal.   Abdominal: She exhibits no distension. There is no hepatosplenomegaly. There is no tenderness.   Lymphadenopathy:     She has no cervical adenopathy.     She has no axillary adenopathy.   Neurological: She has normal strength and normal reflexes. No cranial nerve deficit or sensory deficit.       Assessment/Plan     assessment and plan: Chest pain: Check chest x-ray and stress echo.  Advised her to call immediately if symptom recurs.  She reports blood pressures elevated at home.  Recommended low-sodium diet.  Return to clinic in 1 month blood pressure check.   Check CMP, lipid panel, CBC, TSH

## 2018-04-12 ENCOUNTER — HOSPITAL ENCOUNTER (OUTPATIENT)
Dept: CARDIOLOGY | Facility: CLINIC | Age: 63
Discharge: HOME | End: 2018-04-12
Attending: INTERNAL MEDICINE
Payer: COMMERCIAL

## 2018-04-12 DIAGNOSIS — R07.9 CHEST PAIN, UNSPECIFIED TYPE: ICD-10-CM

## 2018-04-12 LAB — RETIRED EF AND QEF - SEE NOTES: 50 (ref 55–65)

## 2018-04-12 PROCEDURE — 93351 STRESS TTE COMPLETE: CPT | Mod: S$GLB,,, | Performed by: INTERNAL MEDICINE

## 2018-04-12 PROCEDURE — 93321 DOPPLER ECHO F-UP/LMTD STD: CPT | Mod: S$GLB,,, | Performed by: INTERNAL MEDICINE

## 2018-04-14 ENCOUNTER — TELEPHONE (OUTPATIENT)
Dept: INTERNAL MEDICINE | Facility: CLINIC | Age: 63
End: 2018-04-14

## 2018-04-14 DIAGNOSIS — R94.39 ABNORMAL STRESS TEST: Primary | ICD-10-CM

## 2018-04-26 ENCOUNTER — OFFICE VISIT (OUTPATIENT)
Dept: GASTROENTEROLOGY | Facility: CLINIC | Age: 63
End: 2018-04-26
Payer: COMMERCIAL

## 2018-04-26 VITALS
BODY MASS INDEX: 26.36 KG/M2 | SYSTOLIC BLOOD PRESSURE: 155 MMHG | HEIGHT: 66 IN | DIASTOLIC BLOOD PRESSURE: 75 MMHG | HEART RATE: 71 BPM | WEIGHT: 164 LBS

## 2018-04-26 DIAGNOSIS — K58.0 IRRITABLE BOWEL SYNDROME WITH DIARRHEA: Primary | ICD-10-CM

## 2018-04-26 PROCEDURE — 3077F SYST BP >= 140 MM HG: CPT | Mod: CPTII,S$GLB,, | Performed by: NURSE PRACTITIONER

## 2018-04-26 PROCEDURE — 3078F DIAST BP <80 MM HG: CPT | Mod: CPTII,S$GLB,, | Performed by: NURSE PRACTITIONER

## 2018-04-26 PROCEDURE — 99999 PR PBB SHADOW E&M-EST. PATIENT-LVL IV: CPT | Mod: PBBFAC,,, | Performed by: NURSE PRACTITIONER

## 2018-04-26 PROCEDURE — 99214 OFFICE O/P EST MOD 30 MIN: CPT | Mod: S$GLB,,, | Performed by: NURSE PRACTITIONER

## 2018-04-26 RX ORDER — KETOTIFEN FUMARATE 0.35 MG/ML
1 SOLUTION/ DROPS OPHTHALMIC 2 TIMES DAILY
COMMUNITY
End: 2021-10-20

## 2018-04-26 RX ORDER — DICYCLOMINE HYDROCHLORIDE 10 MG/1
10 CAPSULE ORAL
Qty: 90 CAPSULE | Refills: 3 | Status: SHIPPED | OUTPATIENT
Start: 2018-04-26 | End: 2018-12-28 | Stop reason: SDUPTHER

## 2018-04-26 RX ORDER — BISACODYL 5 MG
5 TABLET, DELAYED RELEASE (ENTERIC COATED) ORAL DAILY PRN
COMMUNITY
End: 2020-12-02

## 2018-04-26 NOTE — LETTER
April 26, 2018      Katja Reynolds MD  [address]  [place] [address]           [facility] - Gastroenterology  [address]  [place] [address]  Phone: [phone]  Fax: [phone]          Patient: Polina Griffin   MR Number: [id number]   YOB: 1955   Date of Visit: 4/26/2018       Dear Dr. Katja Reynolds:    Thank you for referring Polina Griffin to me for evaluation. Attached you will find relevant portions of my assessment and plan of care.    If you have questions, please do not hesitate to call me. I look forward to following Polina Griffin along with you.    Sincerely,    Brittney Kruger, TERESE    Enclosure  CC:  No Recipients    If you would like to receive this communication electronically, please contact [email or web address] or [phone] to request more information on [facility] access.    For providers and/or their staff who would like to refer a patient to [facility], please contact us through our one-stop-shop provider referral line, [facility], at [phone].    If you feel you have received this communication in error or would no longer like to receive these types of communications, please e-mail [email or web address]

## 2018-04-26 NOTE — PROGRESS NOTES
[facility] Note    Reason for Visit:  The encounter diagnosis was Irritable bowel syndrome with diarrhea.    PCP:   Katja Reynolds       Referring MD:  Katja Reynolds Md  [address] [place] [address]    HPI:  This is a 62 y.o. female here for f/u evaluation of IBS-D  Since high school    Bowel habits: 1 bristol type 3 or 4 BM most days of the week. If stressed or anxious (ie If has to go to MD or social gathering)  will have 4-5 bristol type 4-5 BM/day occurring once per week.  Also with straining for bristol type 2 stools once weekly. Takes dulcolease periodically. Takes daily probiotic.    Abdominal pain -upper abd bloating with distention is still problematic.   ONSET:since HS with worsening last year  LOCATION:generalized  DURATION:intermitttent daily  CHARACTER:bloating  ASSOCIATED/ALLEVIATING/AGGRAVAITING:no n/v/fevers. + associated distention. + gas when passing hard stools. Worse after meals. Not better with BMs. Not r/t movement. Has not completed breath test for SIBO yet.   RADIATION:none  TEMPORAL:worse immediately after meals. Lasts for a couple of hours  SEVERITY:3/10    Reflux - Hx mild acid reflux/regurgitation well controlled with GERD healthy lifestyle and  PRN Zantac.   Dysphagia/odynophagia - no   GI bleeding - denies hematochezia, hematemesis, melena, BRBPR, black/tarry stools, and coffee ground emesis  NSAID usage - no longer taking 15 mg mobic once monthly for plantar fasciitis. Still taking ibuprofen 400 mg BID for joint pain.     ROS:  Constitutional: No fevers, no chills, No unintentional weight loss, + fatigue   ENT: No allergies  CV: No chest pain, no palpitations, no perif. edema, no sob on exertion  Pulm: No cough, No shortness of breath, no wheezes, no sputum  Ophtho: No vision changes  GI: see HPI; also no nausea, no vomiting, no change in appetite  Derm: No rash  Heme: No lymphadenopathy, No bruising  MSK: + joint pain, no muscle pain, no  muscle weakness  : No dysuria, No hematuria  Endo: No hot or cold intolerance  Neuro: No syncope, No seizure,       Medical History:  has a past medical history of Abnormal Pap smear of cervix (2011, 2013); Anemia; Esophageal reflux; Fibroids; Fibromatosis of plantar fascia; Insomnia; Irritable bowel syndrome (IBS); Ocular migraine; Tinnitus; and Urge incontinence.    Surgical History:  has a past surgical history that includes [place] tooth extraction (Bilateral, 1973); Tubal ligation (Bilateral, 1980); Endometrial biopsy (2010); Nasal turbinate reduction (2015); Appendectomy (2008); Cervical biopsy w/ loop electrode excision (2011); and Colonoscopy (N/A, 12/11/2017).    Family History: family history includes COPD in her mother; Cancer in her father; Coronary artery disease in her father and mother; Diabetes in her brother; Heart attack in her father; Hyperlipidemia in her father and mother; Hypertension in her father and mother; Prostate cancer in her father; Stroke in her father..     Social History:  reports that she has never smoked. She has never used smokeless tobacco. She reports that she does not drink alcohol or use drugs.    Review of patient's allergies indicates:   Allergen Reactions    Amoxicillin Rash       Current Outpatient Prescriptions   Medication Sig    bisacodyl (DULCOLAX) 5 mg EC tablet Take 5 mg by mouth daily as needed for Constipation.    CALCIUM PHOSPHATE TRIB/VIT D3 (CITRACAL + D3, CALCIUM PHOS, ORAL) Take by mouth.    coenzyme Q10 (COQ-10) 100 mg capsule Take 100 mg by mouth once daily.    fluticasone (FLONASE) 50 mcg/actuation nasal spray 1 spray by Each Nare route once daily.    ketotifen (ZADITOR) 0.025 % (0.035 %) ophthalmic solution 1 drop 2 (two) times daily.    LACTOBACILLUS ACIDOPHILUS (PROBIOTIC ORAL) Take by mouth.    MELATONIN (MELATIN ORAL) Take by mouth.    multivitamin capsule Take 1 capsule by mouth once daily.    ranitidine (ZANTAC) 150 MG tablet Take 150 mg  "by mouth 2 (two) times daily as needed for Heartburn.    vit C,S-Mj-ynyig-lutein-zeaxan (PRESERVISION AREDS 2) 655-546-94-1 mg-unit-mg-mg Cap Take 1 tablet by mouth once daily.    dicyclomine (BENTYL) 10 MG capsule Take 1 capsule (10 mg total) by mouth 4 (four) times daily before meals and nightly. As needed     No current facility-administered medications for this visit.        Objective Findings:    Vital Signs:  BP (!) 155/75   Pulse 71   Ht 5' 6" (1.676 m)   Wt 74.4 kg (164 lb 0.4 oz)   LMP  (LMP Unknown)   BMI 26.47 kg/m²   Body mass index is 26.47 kg/m².    Physical Exam:  General Appearance: Well appearing in no acute distress  Head:   Normocephalic, without obvious abnormality  Eyes:    No scleral icterus, EOMI  ENT: Neck supple, Lips, mucosa, and tongue normal; teeth and gums normal  Lungs: CTA bilaterally in anterior and posterior fields, no wheezes, no crackles.  Heart:  Regular rate and rhythm, S1, S2 normal, no murmurs heard  Abdomen: Soft, non tender, non distended with positive bowel sounds in all four quadrants. No hepatosplenomegaly, ascites, or mass  Extremities: 2+ radial pulses, no clubbing, cyanosis or edema  Skin: No rash to exposed areas  Neurologic: A&Ox4      Labs:  Lab Results   Component Value Date    WBC 5.97 04/12/2018    HGB 13.3 04/12/2018    HCT 41.7 04/12/2018     04/12/2018    CHOL 176 04/12/2018    TRIG 137 04/12/2018    HDL 46 04/12/2018    ALT 16 04/12/2018    AST 21 04/12/2018     (H) 04/12/2018    K 4.1 04/12/2018     04/12/2018    CREATININE 0.7 04/12/2018    BUN 14 04/12/2018    CO2 30 (H) 04/12/2018    TSH 2.186 04/12/2018       Endoscopy:    12/11/2017EGD- small HH. Nl stomach (-), nl duodenum.   12/11/2017Colon- GPTC. Sigmoid and descending tics. 2 mm rectal polyp (-). Rpt 5 yrs.  Per pt, colonoscopy 10 years ago in [place]-WNL  Per pt EGD 20 years ago-WNL  Per pt, 3-4 colonoscopies prior to this and proctoscopes in high school- all normal. "     Assessment  1. Irritable bowel syndrome with diarrhea           Recommendations:  1. IBS-D-  Bentyl PRN. Miralax PRN. Send out SIBO testing. Eliminate lactose and sugar substitutes as per written instructions. Avoid nsaids as per handout provided. Discussed the role of mindfulness and meditation in management of functional GI disorders.  Provided handout.      Follow-up in about 3 months (around 7/26/2018) for IBS-D.      Order summary:  Orders Placed This Encounter    dicyclomine (BENTYL) 10 MG capsule         Thank you so much for allowing me to participate in the care of Polina Kruger, APRN, FNP-C

## 2018-04-26 NOTE — PATIENT INSTRUCTIONS
Eliminate all forms of dairy/lactose (milk, cheese, butter, creamers, ice cream etc) and artificial sweeteners (splenda, equal, stevia, truvia, crystal lite, diet sodas, sugar free candy/gum etc) from your diet for 10 days to see if your symptoms improve.             What to Eat and What Not to Eat (to reduce bloating and gas)    Avoid drinking from straws  Avoid eating excessively fast  Avoid eating excessively slow  Avoid gum chewing           Limit:  Beans, broccoli, cabbage  Caffeine (1 small cup of coffee or tea a day)  Fatty meals  Meat  Processed foods (if it comes in a box, has more than 10 listed ingredients, has an expiration date)  Salt    NSAIDS are Non Steroidal Anti Inflammatory Drugs taken for pain.   You should stay away from these medications (unless they are advised due to cardiac health issues) as they may cause irritation and/or ulceration in the lining of your stomach.  Ask your primary care physician for an alternative medication.    Powders - BC Powder, Goody powder, Stanback    Ibuprofen which is also known as : Advil, Advil Childrens, Advil Jamar Strength, Advil Liquigel, Advil Migraine, Advil Pediatric, Childrens Ibuprofen Berry, Genpril, IBU, Midol IB, Midol Maximum Strength Cramp Formula, Dolgesic, Motrin Childrens, Motrin IB, Motrin Infant Drops, Motrin Jamar Strength, Motrin Migraine Pain, Nuprin, Migraine Liqui-gels, Ibu-Tab 200, Cap-Profen, Tab-Profen, Profen, Ibuprohm, Childrens Elixsure, IB Pro, Vicoprofen, Combunox, A-G Profen, Actiprofen, Addaprin, Advil Infants Concentrated Drops, Caldolor, Haltran, Q-Profen, Ibifon 600, Ibren, Menadol, Midol Cramps & Bodyaches, Rufen, Saleto-200, Roderick, Ultraprin, Uni-Pro, Wal-Profen., Famotidine and Ibuprofen can be found as : Duexis.    Aspirin which has the brand name : Arthritis Pain, Aspergum, Aspir-Low, Aspirin Lite Coat, [place] Aspirin, Bufferin, Easprin, Ecotrin, Empirin, Fasprin, Genacote, Halfprin, [place] Aspirin, [place]  Aspirin, Stanback Analgesic, Tri-Buffered Aspirin, YSP Aspirin, Zorprin.    Ketoprofen which is  known as : Orudis KT, Oruvail, Actron.    Sulindac which is sold as : Clinoril.    Naproxen is one of the most known drug from NSAIDs list, and is sold as : Aleve, Naprosyn, EC-Naprosyn, Naprelan, Anaprox, All Day Pain Relief, Aflaxen, All Day Relief, Anaprox-DS, Comfort Pac  With Naproxen,  Aleve Caplet, Aleve Easy Open Arthritis, Aleve Gelcap,  Anaprox-DS, EC-Naprosyn, Leader Naproxen Sodium, Midol Extended Relief, Naprelan 375?, Naprelan 500?, Naprelan 750?, Prevacid NapraPac 375,  Prevacid NapraPac, Naprapac, Vimovo.    Etodolac is sold under the brand name : Lodine XL, Lodine.  Fenoprofen which can be found on the market as : Nalfon, Nalfon 200.  Diclofenac which is sold as : Arthrotec, Cataflam, Voltaren, Cambia, Voltaren-XR, Zipsor.  Flurbiprofen sold as : Asaid.  Ketorolac is sold under the brand name : Sprix, Toradol, Toradol IM, Toradol IV/IM.  Piroxicam is found on the market as : Feldene.  Indomethacin which is known as : Indocin SR, Indocin, Indo-[place], Indomethegan, Indocin IV.  Mefenamic Acid sold as : Ponstel.  Meloxicam is sold under the brand name : Mobic.  Nabumetone which is sold as : Relafen.  Oxaprozin which has the brand name : Daypro.  Ketoprofen which has the brand name : Actron, Orudis KT, Orudis, Oruvail.  Meclofenamate sold as : Meclomen.  Tolmetin which can be found on the marked as : Tolectin, Tolectin 600, Tolectin DS.  Salsalate which is sold as : Disalcid.

## 2018-04-27 ENCOUNTER — PATIENT MESSAGE (OUTPATIENT)
Dept: GASTROENTEROLOGY | Facility: CLINIC | Age: 63
End: 2018-04-27

## 2018-05-03 ENCOUNTER — OFFICE VISIT (OUTPATIENT)
Dept: CARDIOLOGY | Facility: CLINIC | Age: 63
End: 2018-05-03
Payer: COMMERCIAL

## 2018-05-03 VITALS
HEART RATE: 78 BPM | HEIGHT: 66 IN | BODY MASS INDEX: 26.5 KG/M2 | DIASTOLIC BLOOD PRESSURE: 60 MMHG | SYSTOLIC BLOOD PRESSURE: 120 MMHG | WEIGHT: 164.88 LBS

## 2018-05-03 DIAGNOSIS — R07.89 NON-CARDIAC CHEST PAIN: Primary | ICD-10-CM

## 2018-05-03 PROCEDURE — 99999 PR PBB SHADOW E&M-EST. PATIENT-LVL III: CPT | Mod: PBBFAC,,, | Performed by: INTERNAL MEDICINE

## 2018-05-03 PROCEDURE — 99244 OFF/OP CNSLTJ NEW/EST MOD 40: CPT | Mod: S$GLB,,, | Performed by: INTERNAL MEDICINE

## 2018-05-03 NOTE — LETTER
May 3, 2018      Katja Reynolds MD  [address]  [facility] [address]           [facility] - Cardiology  [address]  [facility] [address]  Phone: [phone]          Patient: Polina Griffin   MR Number: [id number]   YOB: 1955   Date of Visit: 5/3/2018       Dear Dr. Katja Reynolds:    Thank you for referring Polina Griffin to me for evaluation. Attached you will find relevant portions of my assessment and plan of care.    If you have questions, please do not hesitate to call me. I look forward to following Polina Griffin along with you.    Sincerely,    Jean Neumann MD    Enclosure  CC:  No Recipients    If you would like to receive this communication electronically, please contact [email or web address] or [phone] to request more information on get2play Link access.    For providers and/or their staff who would like to refer a patient to [facility], please contact us through our one-stop-shop provider referral line, [facility] , at [phone].    If you feel you have received this communication in error or would no longer like to receive these types of communications, please e-mail [email or web address]

## 2018-05-03 NOTE — PROGRESS NOTES
"Subjective:    Patient ID:  Polina Griffin is a 62 y.o. female who presents for evaluation of Hypertension      HPI     The patient is a 60 year old female referred by Dr Reynolds for evaluation of an abnormal stress echo ordered to evaluate chest pain. She states that over the past several weeks she has noted 4/10 "pressure" while at work on her computer under stress. She goes to [facility] 3 days a week and has no exertional chest pain or BENDER. She has no history of hypertension, diabetes or hyprlipidemia and is a non smoker. Her parents had CVD but at advanced age.  Lab Results   Component Value Date     (H) 04/12/2018    K 4.1 04/12/2018     04/12/2018    CO2 30 (H) 04/12/2018    BUN 14 04/12/2018    CREATININE 0.7 04/12/2018    GLU 95 04/12/2018    AST 21 04/12/2018    ALT 16 04/12/2018    ALBUMIN 4.0 04/12/2018    PROT 7.2 04/12/2018    BILITOT 0.9 04/12/2018    WBC 5.97 04/12/2018    HGB 13.3 04/12/2018    HCT 41.7 04/12/2018    MCV 90 04/12/2018     04/12/2018    TSH 2.186 04/12/2018         Lab Results   Component Value Date    CHOL 176 04/12/2018    HDL 46 04/12/2018    TRIG 137 04/12/2018       Lab Results   Component Value Date    LDLCALC 102.6 04/12/2018       Past Medical History:   Diagnosis Date    Abnormal Pap smear of cervix 2011, 2013    LEEP    Anemia     in her 20's    Esophageal reflux     Fibroids     Fibromatosis of plantar fascia     Insomnia     Irritable bowel syndrome (IBS)     Ocular migraine     Tinnitus     Urge incontinence        Current Outpatient Prescriptions:     bisacodyl (DULCOLAX) 5 mg EC tablet, Take 5 mg by mouth daily as needed for Constipation., Disp: , Rfl:     CALCIUM PHOSPHATE TRIB/VIT D3 (CITRACAL + D3, CALCIUM PHOS, ORAL), Take by mouth., Disp: , Rfl:     coenzyme Q10 (COQ-10) 100 mg capsule, Take 100 mg by mouth once daily., Disp: , Rfl:     dicyclomine (BENTYL) 10 MG capsule, Take 1 capsule (10 mg total) by mouth 4 (four) " times daily before meals and nightly. As needed, Disp: 90 capsule, Rfl: 3    fluticasone (FLONASE) 50 mcg/actuation nasal spray, 1 spray by Each Nare route once daily., Disp: , Rfl:     ketotifen (ZADITOR) 0.025 % (0.035 %) ophthalmic solution, 1 drop 2 (two) times daily., Disp: , Rfl:     LACTOBACILLUS ACIDOPHILUS (PROBIOTIC ORAL), Take by mouth., Disp: , Rfl:     MELATONIN (MELATIN ORAL), Take by mouth., Disp: , Rfl:     multivitamin capsule, Take 1 capsule by mouth once daily., Disp: , Rfl:     ranitidine (ZANTAC) 150 MG tablet, Take 150 mg by mouth 2 (two) times daily as needed for Heartburn., Disp: , Rfl:     vit C,K-Tl-wxdnm-lutein-zeaxan (PRESERVISION AREDS 2) [phone] mg-unit-mg-mg Cap, Take 1 tablet by mouth once daily., Disp: , Rfl:         Review of Systems   Constitution: Negative for decreased appetite, diaphoresis, fever, weakness, malaise/fatigue, weight gain and weight loss.   HENT: Negative for congestion, ear discharge, ear pain and nosebleeds.    Eyes: Negative for blurred vision, double vision and visual disturbance.   Cardiovascular: Positive for chest pain. Negative for claudication, cyanosis, dyspnea on exertion, irregular heartbeat, leg swelling, near-syncope, orthopnea, palpitations, paroxysmal nocturnal dyspnea and syncope.   Respiratory: Negative for cough, hemoptysis, shortness of breath, sleep disturbances due to breathing, snoring, sputum production and wheezing.    Endocrine: Negative for polydipsia, polyphagia and polyuria.   Hematologic/Lymphatic: Negative for adenopathy and bleeding problem. Does not bruise/bleed easily.   Skin: Negative for color change, nail changes, poor wound healing and rash.   Musculoskeletal: Negative for muscle cramps and muscle weakness.   Gastrointestinal: Negative for abdominal pain, anorexia, change in bowel habit, hematochezia, nausea and vomiting.   Genitourinary: Negative for dysuria, frequency and hematuria.   Neurological: Negative for  "brief paralysis, difficulty with concentration, excessive daytime sleepiness, dizziness, focal weakness, headaches, light-headedness, seizures and vertigo.   Psychiatric/Behavioral: Negative for altered mental status and depression.   Allergic/Immunologic: Negative for persistent infections.        Objective:/60   Pulse 78   Ht 5' 6" (1.676 m)   Wt 74.8 kg (164 lb 14.5 oz)   LMP  (LMP Unknown)   BMI 26.62 kg/m²           Physical Exam   Constitutional: She is oriented to person, place, and time. She appears well-developed and well-nourished.   HENT:   Head: Normocephalic.   Right Ear: External ear normal.   Left Ear: External ear normal.   Nose: Nose normal.   Inspection of lips, teeth and gums normal   Eyes: Conjunctivae and EOM are normal. Pupils are equal, round, and reactive to light. No scleral icterus.   Neck: Normal range of motion. No JVD present. No tracheal deviation present. No thyromegaly present.   Cardiovascular: Normal rate, regular rhythm, normal heart sounds and intact distal pulses.  Exam reveals no gallop and no friction rub.    No murmur heard.  Pulses:       Carotid pulses are 2+ on the right side, and 2+ on the left side.       Dorsalis pedis pulses are 2+ on the right side, and 2+ on the left side.        Posterior tibial pulses are 2+ on the right side, and 2+ on the left side.   Pulmonary/Chest: Effort normal and breath sounds normal. No respiratory distress. She has no wheezes. She has no rales. She exhibits no tenderness.   Abdominal: Soft. Bowel sounds are normal. She exhibits no distension. There is no hepatosplenomegaly. There is no tenderness. There is no guarding.   Musculoskeletal: Normal range of motion. She exhibits no edema or tenderness.   Lymphadenopathy:   Palpation of lymph nodes of neck and groin normal   Neurological: She is oriented to person, place, and time. No cranial nerve deficit. She exhibits normal muscle tone. Coordination normal.   Skin: Skin is warm and " dry. No rash noted. No erythema. No pallor.   Palpation of skin normal   Psychiatric: She has a normal mood and affect. Her behavior is normal. Judgment and thought content normal.         Assessment:       1. Non-cardiac chest pain         Plan:       Polina was seen today for hypertension.    Diagnoses and all orders for this visit:    Non-cardiac chest pain

## 2018-05-22 ENCOUNTER — PATIENT MESSAGE (OUTPATIENT)
Dept: GASTROENTEROLOGY | Facility: CLINIC | Age: 63
End: 2018-05-22

## 2018-05-24 ENCOUNTER — OFFICE VISIT (OUTPATIENT)
Dept: INTERNAL MEDICINE | Facility: CLINIC | Age: 63
End: 2018-05-24
Payer: COMMERCIAL

## 2018-05-24 DIAGNOSIS — R03.0 SINGLE EPISODE OF ELEVATED BLOOD PRESSURE: Primary | ICD-10-CM

## 2018-05-24 PROCEDURE — 3074F SYST BP LT 130 MM HG: CPT | Mod: CPTII,S$GLB,, | Performed by: INTERNAL MEDICINE

## 2018-05-24 PROCEDURE — 99213 OFFICE O/P EST LOW 20 MIN: CPT | Mod: S$GLB,,, | Performed by: INTERNAL MEDICINE

## 2018-05-24 PROCEDURE — 3078F DIAST BP <80 MM HG: CPT | Mod: CPTII,S$GLB,, | Performed by: INTERNAL MEDICINE

## 2018-05-24 PROCEDURE — 3008F BODY MASS INDEX DOCD: CPT | Mod: CPTII,S$GLB,, | Performed by: INTERNAL MEDICINE

## 2018-05-24 PROCEDURE — 99999 PR PBB SHADOW E&M-EST. PATIENT-LVL IV: CPT | Mod: PBBFAC,,, | Performed by: INTERNAL MEDICINE

## 2018-05-24 RX ORDER — VENLAFAXINE HYDROCHLORIDE 37.5 MG/1
37.5 CAPSULE, EXTENDED RELEASE ORAL DAILY
Qty: 30 CAPSULE | Refills: 3 | Status: SHIPPED | OUTPATIENT
Start: 2018-05-24 | End: 2018-08-13 | Stop reason: ALTCHOICE

## 2018-05-27 VITALS
SYSTOLIC BLOOD PRESSURE: 126 MMHG | HEIGHT: 66 IN | OXYGEN SATURATION: 99 % | HEART RATE: 62 BPM | DIASTOLIC BLOOD PRESSURE: 78 MMHG | BODY MASS INDEX: 26.2 KG/M2 | TEMPERATURE: 98 F | WEIGHT: 163 LBS

## 2018-05-27 NOTE — PROGRESS NOTES
Subjective:       Patient ID: Polina Griffin is a 62 y.o. female.    Chief Complaint: Follow-up    HPI  She is here for follow-up.  Currently without complaint  Review of Systems   Constitutional: Negative for chills, fatigue, fever and unexpected weight change.   Respiratory: Negative for chest tightness and shortness of breath.    Cardiovascular: Negative for chest pain and palpitations.   Gastrointestinal: Negative for abdominal pain and blood in stool.   Neurological: Negative for dizziness, syncope, numbness and headaches.       Objective:      Physical Exam   HENT:   Right Ear: External ear normal.   Left Ear: External ear normal.   Nose: Nose normal.   Mouth/Throat: Oropharynx is clear and moist.   Eyes: Pupils are equal, round, and reactive to light.   Neck: Normal range of motion.   Cardiovascular: Normal rate and regular rhythm.    No murmur heard.  Pulmonary/Chest: Breath sounds normal.   Abdominal: She exhibits no distension. There is no hepatosplenomegaly. There is no tenderness.   Lymphadenopathy:     She has no cervical adenopathy.     She has no axillary adenopathy.   Neurological: She has normal strength and normal reflexes. No cranial nerve deficit or sensory deficit.       Assessment/Plan       Assessment and plan:  Patient normotensive.  Chest pain resolved

## 2018-08-13 ENCOUNTER — PATIENT MESSAGE (OUTPATIENT)
Dept: INTERNAL MEDICINE | Facility: CLINIC | Age: 63
End: 2018-08-13

## 2018-08-13 RX ORDER — VENLAFAXINE HYDROCHLORIDE 75 MG/1
75 CAPSULE, EXTENDED RELEASE ORAL DAILY
Qty: 30 CAPSULE | Refills: 5 | Status: SHIPPED | OUTPATIENT
Start: 2018-08-13 | End: 2019-01-16 | Stop reason: SDUPTHER

## 2018-10-05 ENCOUNTER — PATIENT MESSAGE (OUTPATIENT)
Dept: INTERNAL MEDICINE | Facility: CLINIC | Age: 63
End: 2018-10-05

## 2018-12-28 ENCOUNTER — PATIENT MESSAGE (OUTPATIENT)
Dept: GASTROENTEROLOGY | Facility: CLINIC | Age: 63
End: 2018-12-28

## 2018-12-28 DIAGNOSIS — K58.0 IRRITABLE BOWEL SYNDROME WITH DIARRHEA: ICD-10-CM

## 2018-12-28 RX ORDER — DICYCLOMINE HYDROCHLORIDE 10 MG/1
10 CAPSULE ORAL
Qty: 90 CAPSULE | Refills: 3 | Status: SHIPPED | OUTPATIENT
Start: 2018-12-28 | End: 2020-12-02

## 2019-01-16 RX ORDER — VENLAFAXINE HYDROCHLORIDE 75 MG/1
CAPSULE, EXTENDED RELEASE ORAL
Qty: 30 CAPSULE | Refills: 1 | Status: SHIPPED | OUTPATIENT
Start: 2019-01-16 | End: 2020-12-02

## 2019-03-14 ENCOUNTER — PATIENT MESSAGE (OUTPATIENT)
Dept: INTERNAL MEDICINE | Facility: CLINIC | Age: 64
End: 2019-03-14

## 2019-04-09 ENCOUNTER — PATIENT MESSAGE (OUTPATIENT)
Dept: INTERNAL MEDICINE | Facility: CLINIC | Age: 64
End: 2019-04-09

## 2019-04-23 ENCOUNTER — OFFICE VISIT (OUTPATIENT)
Dept: FAMILY MEDICINE | Facility: CLINIC | Age: 64
End: 2019-04-23
Payer: COMMERCIAL

## 2019-04-23 VITALS
SYSTOLIC BLOOD PRESSURE: 169 MMHG | HEIGHT: 66 IN | WEIGHT: 166.5 LBS | BODY MASS INDEX: 26.76 KG/M2 | TEMPERATURE: 98 F | RESPIRATION RATE: 15 BRPM | OXYGEN SATURATION: 98 % | HEART RATE: 75 BPM | DIASTOLIC BLOOD PRESSURE: 74 MMHG

## 2019-04-23 DIAGNOSIS — Z00.00 WELLNESS EXAMINATION: Primary | ICD-10-CM

## 2019-04-23 DIAGNOSIS — R53.82 CHRONIC FATIGUE: ICD-10-CM

## 2019-04-23 PROCEDURE — 99203 PR OFFICE/OUTPT VISIT, NEW, LEVL III, 30-44 MIN: ICD-10-PCS | Mod: S$PBB,,, | Performed by: NURSE PRACTITIONER

## 2019-04-23 PROCEDURE — 99999 PR PBB SHADOW E&M-EST. PATIENT-LVL III: ICD-10-PCS | Mod: PBBFAC,,, | Performed by: NURSE PRACTITIONER

## 2019-04-23 PROCEDURE — 99999 PR PBB SHADOW E&M-EST. PATIENT-LVL III: CPT | Mod: PBBFAC,,, | Performed by: NURSE PRACTITIONER

## 2019-04-23 PROCEDURE — 99213 OFFICE O/P EST LOW 20 MIN: CPT | Mod: PBBFAC,PN | Performed by: NURSE PRACTITIONER

## 2019-04-23 PROCEDURE — 99203 OFFICE O/P NEW LOW 30 MIN: CPT | Mod: S$PBB,,, | Performed by: NURSE PRACTITIONER

## 2019-04-23 NOTE — PROGRESS NOTES
Subjective:       Patient ID: Polina Griffin is a 63 y.o. female.    Chief Complaint: Establish Care and Insomnia    Ashley Griffin is a 63 year old female who presents to the clinic today to establish care. Overall, Ms. Griffin has a few concerns. Her first concern is regarding fatigue. She reports this has been an ongoing issue for quite some time. She denies any significant medical hx other than IBS. She reports she is always tired. She denies hx of thyroid issues. She reports adequate amounts of sleep. She is also concerned today regarding night sweats that have been ongoing for 2 weeks ago. She reports they started when she got off her effexor after weaning. She denies suicidal thoughts, worsening mood. She denies cp, sob, n/v/d.     Review of Systems   Constitutional: Positive for diaphoresis (night sweats x 2 weeks ) and fatigue. Negative for activity change, chills, fever and unexpected weight change.   HENT: Negative for congestion, ear pain, postnasal drip, sinus pressure, sneezing, sore throat and tinnitus.    Eyes: Negative for pain, redness and visual disturbance.        Eye exam UTD   Respiratory: Negative for apnea, cough, chest tightness, shortness of breath and wheezing.    Cardiovascular: Negative for chest pain, palpitations and leg swelling.   Gastrointestinal: Negative for abdominal distention, abdominal pain, blood in stool, constipation, diarrhea, nausea and vomiting.        Colonoscopy UTD   Endocrine: Negative for polydipsia, polyphagia and polyuria.   Genitourinary: Negative for difficulty urinating, dysuria, flank pain, frequency, hematuria and urgency.        Hx abnormal PAP in past     Musculoskeletal: Negative for arthralgias, back pain, joint swelling and myalgias.   Skin: Negative for color change, pallor and rash.   Allergic/Immunologic: Negative for environmental allergies, food allergies and immunocompromised state.   Neurological: Negative for dizziness, tremors, syncope, weakness,  "light-headedness and headaches.   Hematological: Negative for adenopathy. Does not bruise/bleed easily.   Psychiatric/Behavioral: Negative for agitation, confusion, decreased concentration, self-injury, sleep disturbance and suicidal ideas. The patient is not nervous/anxious and is not hyperactive.         Hx IBS - stress induced; recently off effexor          Reviewed family, medical, surgical, and social history.    Objective:      BP (!) 169/74 (BP Location: Left arm, Patient Position: Sitting, BP Method: Large (Automatic))   Pulse 75   Temp 98.2 °F (36.8 °C) (Oral)   Resp 15   Ht 5' 6" (1.676 m)   Wt 75.5 kg (166 lb 8 oz)   LMP  (LMP Unknown)   SpO2 98%   BMI 26.87 kg/m²   Physical Exam    Assessment:       1. Wellness examination    2. Chronic fatigue        Plan:       Wellness examination  -     CBC auto differential; Future; Expected date: 04/23/2019  -     Lipid panel; Future; Expected date: 04/23/2019  -     Comprehensive metabolic panel; Future; Expected date: 04/24/2019    Chronic fatigue  -     Vitamin B12; Future; Expected date: 04/23/2019  -     Vitamin D; Future; Expected date: 04/23/2019          PLAN:  - Discussed with patient the plan of care  - Medications reviewed. Medication side effects discussed. Patient has no questions or concerns at this time. Informed patient to notify me regarding any concerns.   - Continue monitoring and documenting BP in log book. She reports most likely elevated to appointment. Reports she has been checking periodically at local pharmacies with BP readings of 130/70  - Informed patient to please notify me with any questions or concerns at anytime  - Follow up ordered for 6 months unless abnormal labs or worsening fatigue/night sweats  - 6 month appointment for well woman exam   - Patient has patient portal linda               Risks, benefits, and side effects were discussed with the patient. All questions were answered to the fullest satisfaction of the patient, " and pt verbalized understanding and agreement to treatment plan. Pt was to call with any new or worsening symptoms, or present to the ER.

## 2019-04-25 ENCOUNTER — LAB VISIT (OUTPATIENT)
Dept: LAB | Facility: HOSPITAL | Age: 64
End: 2019-04-25
Attending: NURSE PRACTITIONER

## 2019-04-25 ENCOUNTER — PATIENT MESSAGE (OUTPATIENT)
Dept: FAMILY MEDICINE | Facility: CLINIC | Age: 64
End: 2019-04-25

## 2019-04-25 DIAGNOSIS — R53.82 CHRONIC FATIGUE: ICD-10-CM

## 2019-04-25 DIAGNOSIS — Z00.00 WELLNESS EXAMINATION: ICD-10-CM

## 2019-04-25 LAB
25(OH)D3+25(OH)D2 SERPL-MCNC: 46 NG/ML (ref 30–96)
ALBUMIN SERPL BCP-MCNC: 4.3 G/DL (ref 3.5–5.2)
ALP SERPL-CCNC: 72 U/L (ref 55–135)
ALT SERPL W/O P-5'-P-CCNC: 27 U/L (ref 10–44)
ANION GAP SERPL CALC-SCNC: 7 MMOL/L (ref 8–16)
AST SERPL-CCNC: 29 U/L (ref 10–40)
BASOPHILS # BLD AUTO: 0.02 K/UL (ref 0–0.2)
BASOPHILS NFR BLD: 0.4 % (ref 0–1.9)
BILIRUB SERPL-MCNC: 0.7 MG/DL (ref 0.1–1)
BUN SERPL-MCNC: 14 MG/DL (ref 8–23)
CALCIUM SERPL-MCNC: 8.6 MG/DL (ref 8.7–10.5)
CHLORIDE SERPL-SCNC: 108 MMOL/L (ref 95–110)
CHOLEST SERPL-MCNC: 184 MG/DL (ref 120–199)
CHOLEST/HDLC SERPL: 4.7 {RATIO} (ref 2–5)
CO2 SERPL-SCNC: 27 MMOL/L (ref 23–29)
CREAT SERPL-MCNC: 0.7 MG/DL (ref 0.5–1.4)
DIFFERENTIAL METHOD: ABNORMAL
EOSINOPHIL # BLD AUTO: 0.1 K/UL (ref 0–0.5)
EOSINOPHIL NFR BLD: 1 % (ref 0–8)
ERYTHROCYTE [DISTWIDTH] IN BLOOD BY AUTOMATED COUNT: 13.2 % (ref 11.5–14.5)
EST. GFR  (AFRICAN AMERICAN): >60 ML/MIN/1.73 M^2
EST. GFR  (NON AFRICAN AMERICAN): >60 ML/MIN/1.73 M^2
GLUCOSE SERPL-MCNC: 95 MG/DL (ref 70–110)
HCT VFR BLD AUTO: 40.7 % (ref 37–48.5)
HDLC SERPL-MCNC: 39 MG/DL (ref 40–75)
HDLC SERPL: 21.2 % (ref 20–50)
HGB BLD-MCNC: 12.8 G/DL (ref 12–16)
IMM GRANULOCYTES # BLD AUTO: 0.01 K/UL (ref 0–0.04)
IMM GRANULOCYTES NFR BLD AUTO: 0.2 % (ref 0–0.5)
LDLC SERPL CALC-MCNC: 112.2 MG/DL (ref 63–159)
LYMPHOCYTES # BLD AUTO: 1.4 K/UL (ref 1–4.8)
LYMPHOCYTES NFR BLD: 26 % (ref 18–48)
MCH RBC QN AUTO: 28.7 PG (ref 27–31)
MCHC RBC AUTO-ENTMCNC: 31.4 G/DL (ref 32–36)
MCV RBC AUTO: 91 FL (ref 82–98)
MONOCYTES # BLD AUTO: 0.3 K/UL (ref 0.3–1)
MONOCYTES NFR BLD: 5.7 % (ref 4–15)
NEUTROPHILS # BLD AUTO: 3.5 K/UL (ref 1.8–7.7)
NEUTROPHILS NFR BLD: 66.7 % (ref 38–73)
NONHDLC SERPL-MCNC: 145 MG/DL
NRBC BLD-RTO: 0 /100 WBC
PLATELET # BLD AUTO: 197 K/UL (ref 150–350)
PMV BLD AUTO: 10.1 FL (ref 9.2–12.9)
POTASSIUM SERPL-SCNC: 3.9 MMOL/L (ref 3.5–5.1)
PROT SERPL-MCNC: 7.2 G/DL (ref 6–8.4)
RBC # BLD AUTO: 4.46 M/UL (ref 4–5.4)
SODIUM SERPL-SCNC: 142 MMOL/L (ref 136–145)
TRIGL SERPL-MCNC: 164 MG/DL (ref 30–150)
VIT B12 SERPL-MCNC: 651 PG/ML (ref 210–950)
WBC # BLD AUTO: 5.23 K/UL (ref 3.9–12.7)

## 2019-04-25 PROCEDURE — 36415 COLL VENOUS BLD VENIPUNCTURE: CPT

## 2019-04-25 PROCEDURE — 80061 LIPID PANEL: CPT

## 2019-04-25 PROCEDURE — 82607 VITAMIN B-12: CPT

## 2019-04-25 PROCEDURE — 80053 COMPREHEN METABOLIC PANEL: CPT

## 2019-04-25 PROCEDURE — 85025 COMPLETE CBC W/AUTO DIFF WBC: CPT

## 2019-04-25 PROCEDURE — 82306 VITAMIN D 25 HYDROXY: CPT

## 2019-04-26 ENCOUNTER — TELEPHONE (OUTPATIENT)
Dept: FAMILY MEDICINE | Facility: CLINIC | Age: 64
End: 2019-04-26

## 2019-04-26 NOTE — TELEPHONE ENCOUNTER
----- Message from Negrita Way NP sent at 4/26/2019  8:17 AM CDT -----  Please let Ms. Griffin know I reviewed labs. Her vitamin b12 and vitamin d were within normal limits.   Her triglycerides were elevated. I like that level < 150. This is most likely related to her diet. I would watch sugar and carb intake. I recommend exercising for 30 minutes at least 3-4x a day. Her calcium was 8.6. I would recommend taking a multi vitamin daily. I encourage yogurt, salmon, beans, almonds and leafy green vegetables. I recommend getting a DEXA scan for her to make sure no osteoporosis is developing. If she has any questions, please let me know

## 2019-04-26 NOTE — PROGRESS NOTES
Please let Ms. Griffin know I reviewed labs. Her vitamin b12 and vitamin d were within normal limits.   Her triglycerides were elevated. I like that level < 150. This is most likely related to her diet. I would watch sugar and carb intake. I recommend exercising for 30 minutes at least 3-4x a day. Her calcium was 8.6. I would recommend taking a multi vitamin daily. I encourage yogurt, salmon, beans, almonds and leafy green vegetables. I recommend getting a DEXA scan for her to make sure no osteoporosis is developing. If she has any questions, please let me know

## 2019-04-26 NOTE — TELEPHONE ENCOUNTER
----- Message from Dunia Cordova sent at 4/26/2019  9:15 AM CDT -----  Contact: patient  Type:  Patient Returning Call    Who Called:  patient  Who Left Message for Patient:  jamal  Does the patient know what this is regarding?:    Best Call Back Number: before 12pm cll this number [phone]   Additional Information:

## 2019-04-26 NOTE — TELEPHONE ENCOUNTER
I have returned patient's call at the number provided on the attached message. I receive a recording stating that the call cannot be completed as dialed.  I have left a voicemail at [phone] with results of labs.  If she has questions, please call back at [phone].  Nasrin

## 2019-12-09 ENCOUNTER — PATIENT OUTREACH (OUTPATIENT)
Dept: ADMINISTRATIVE | Facility: HOSPITAL | Age: 64
End: 2019-12-09

## 2019-12-09 NOTE — PROGRESS NOTES
VM left to address if pt had MMG done externally and to have that provider fax the report  to our office, number provided.She was instructed to call our office if she has not had a recent MMG.

## 2020-01-10 DIAGNOSIS — Z12.39 BREAST CANCER SCREENING: ICD-10-CM

## 2020-10-05 ENCOUNTER — PATIENT MESSAGE (OUTPATIENT)
Dept: ADMINISTRATIVE | Facility: HOSPITAL | Age: 65
End: 2020-10-05

## 2020-12-02 ENCOUNTER — OFFICE VISIT (OUTPATIENT)
Dept: FAMILY MEDICINE | Facility: CLINIC | Age: 65
End: 2020-12-02
Payer: MEDICARE

## 2020-12-02 VITALS
RESPIRATION RATE: 18 BRPM | HEART RATE: 85 BPM | WEIGHT: 157 LBS | DIASTOLIC BLOOD PRESSURE: 79 MMHG | HEIGHT: 66 IN | TEMPERATURE: 98 F | OXYGEN SATURATION: 98 % | SYSTOLIC BLOOD PRESSURE: 133 MMHG | BODY MASS INDEX: 25.23 KG/M2

## 2020-12-02 DIAGNOSIS — Z23 PNEUMOCOCCAL VACCINE ADMINISTERED: ICD-10-CM

## 2020-12-02 DIAGNOSIS — Z13.6 ENCOUNTER FOR LIPID SCREENING FOR CARDIOVASCULAR DISEASE: ICD-10-CM

## 2020-12-02 DIAGNOSIS — Z12.31 ENCOUNTER FOR SCREENING MAMMOGRAM FOR BREAST CANCER: ICD-10-CM

## 2020-12-02 DIAGNOSIS — Z51.81 ENCOUNTER FOR MEDICATION MONITORING: ICD-10-CM

## 2020-12-02 DIAGNOSIS — Z13.220 ENCOUNTER FOR LIPID SCREENING FOR CARDIOVASCULAR DISEASE: ICD-10-CM

## 2020-12-02 DIAGNOSIS — K21.9 GASTROESOPHAGEAL REFLUX DISEASE, UNSPECIFIED WHETHER ESOPHAGITIS PRESENT: Primary | ICD-10-CM

## 2020-12-02 DIAGNOSIS — N95.1 VASOMOTOR SYMPTOMS DUE TO MENOPAUSE: ICD-10-CM

## 2020-12-02 DIAGNOSIS — Z78.0 ASYMPTOMATIC MENOPAUSAL STATE: ICD-10-CM

## 2020-12-02 PROCEDURE — 3288F PR FALLS RISK ASSESSMENT DOCUMENTED: ICD-10-PCS | Mod: CPTII,S$GLB,, | Performed by: FAMILY MEDICINE

## 2020-12-02 PROCEDURE — 3008F BODY MASS INDEX DOCD: CPT | Mod: CPTII,S$GLB,, | Performed by: FAMILY MEDICINE

## 2020-12-02 PROCEDURE — 1101F PR PT FALLS ASSESS DOC 0-1 FALLS W/OUT INJ PAST YR: ICD-10-PCS | Mod: CPTII,S$GLB,, | Performed by: FAMILY MEDICINE

## 2020-12-02 PROCEDURE — 99214 OFFICE O/P EST MOD 30 MIN: CPT | Mod: S$GLB,25,, | Performed by: FAMILY MEDICINE

## 2020-12-02 PROCEDURE — 90732 PNEUMOCOCCAL POLYSACCHARIDE VACCINE 23-VALENT =>2YO SQ IM: ICD-10-PCS | Mod: S$GLB,,, | Performed by: FAMILY MEDICINE

## 2020-12-02 PROCEDURE — G0009 PNEUMOCOCCAL POLYSACCHARIDE VACCINE 23-VALENT =>2YO SQ IM: ICD-10-PCS | Mod: S$GLB,,, | Performed by: FAMILY MEDICINE

## 2020-12-02 PROCEDURE — 99214 PR OFFICE/OUTPT VISIT, EST, LEVL IV, 30-39 MIN: ICD-10-PCS | Mod: S$GLB,25,, | Performed by: FAMILY MEDICINE

## 2020-12-02 PROCEDURE — 3078F PR MOST RECENT DIASTOLIC BLOOD PRESSURE < 80 MM HG: ICD-10-PCS | Mod: CPTII,S$GLB,, | Performed by: FAMILY MEDICINE

## 2020-12-02 PROCEDURE — 1126F PR PAIN SEVERITY QUANTIFIED, NO PAIN PRESENT: ICD-10-PCS | Mod: S$GLB,,, | Performed by: FAMILY MEDICINE

## 2020-12-02 PROCEDURE — 3008F PR BODY MASS INDEX (BMI) DOCUMENTED: ICD-10-PCS | Mod: CPTII,S$GLB,, | Performed by: FAMILY MEDICINE

## 2020-12-02 PROCEDURE — 90732 PPSV23 VACC 2 YRS+ SUBQ/IM: CPT | Mod: S$GLB,,, | Performed by: FAMILY MEDICINE

## 2020-12-02 PROCEDURE — 1126F AMNT PAIN NOTED NONE PRSNT: CPT | Mod: S$GLB,,, | Performed by: FAMILY MEDICINE

## 2020-12-02 PROCEDURE — 3078F DIAST BP <80 MM HG: CPT | Mod: CPTII,S$GLB,, | Performed by: FAMILY MEDICINE

## 2020-12-02 PROCEDURE — G0009 ADMIN PNEUMOCOCCAL VACCINE: HCPCS | Mod: S$GLB,,, | Performed by: FAMILY MEDICINE

## 2020-12-02 PROCEDURE — 3075F PR MOST RECENT SYSTOLIC BLOOD PRESS GE 130-139MM HG: ICD-10-PCS | Mod: CPTII,S$GLB,, | Performed by: FAMILY MEDICINE

## 2020-12-02 PROCEDURE — 3288F FALL RISK ASSESSMENT DOCD: CPT | Mod: CPTII,S$GLB,, | Performed by: FAMILY MEDICINE

## 2020-12-02 PROCEDURE — 1101F PT FALLS ASSESS-DOCD LE1/YR: CPT | Mod: CPTII,S$GLB,, | Performed by: FAMILY MEDICINE

## 2020-12-02 PROCEDURE — 3075F SYST BP GE 130 - 139MM HG: CPT | Mod: CPTII,S$GLB,, | Performed by: FAMILY MEDICINE

## 2020-12-02 RX ORDER — ZINC GLUCONATE 13.3 MG
200 LOZENGE ORAL DAILY
COMMUNITY
Start: 2020-11-02 | End: 2020-12-02 | Stop reason: DRUGHIGH

## 2020-12-02 RX ORDER — ESCITALOPRAM OXALATE 10 MG/1
10 TABLET ORAL DAILY
Qty: 30 TABLET | Refills: 2 | Status: SHIPPED | OUTPATIENT
Start: 2020-12-02 | End: 2021-02-04 | Stop reason: SINTOL

## 2020-12-02 RX ORDER — CIMETIDINE 400 MG/1
400 TABLET, FILM COATED ORAL 2 TIMES DAILY
Qty: 60 TABLET | Refills: 2 | Status: SHIPPED | OUTPATIENT
Start: 2020-12-02 | End: 2020-12-29

## 2020-12-02 NOTE — PROGRESS NOTES
[facility] - Clinic Note    Subjective      Ms. Griffin is a 65 y.o. female who presents to clinic with complaints of reflux and hot flashes.    Complains of hot flashes.   Present for years.   Unable to tolerate now.   Interfering with her sleep.   Would like to try something for it.   Has not tried anything prior or any OTC supplements.     GERD: diagnosed with a small hiatal hernia about 3 years ago.   States that her symptoms of reflux are getting worse and more constant.   Unsure certain foods it occurs with.   Tried over the count cimetidine and tums with slight relief.   Denies abdominal pain or emesis.   No unintentional weight loss.      [facility] Polina has a past medical history of Abnormal Pap smear of cervix (2011, 2013), Anemia, Esophageal reflux, Fibroids, Fibromatosis of plantar fascia, Insomnia, Irritable bowel syndrome (IBS), Ocular migraine, Tinnitus, and Urge incontinence.   PSXH Polina has a past surgical history that includes [place] tooth extraction (Bilateral, 1973); Tubal ligation (Bilateral, 1980); Endometrial biopsy (2010); Nasal turbinate reduction (2015); Appendectomy (2008); Cervical biopsy w/ loop electrode excision (2011); and Colonoscopy (N/A, 12/11/2017).    Polina's family history includes COPD in her mother; Cancer in her father; Coronary artery disease in her father and mother; Diabetes in her brother; Heart attack in her father; Hyperlipidemia in her father and mother; Hypertension in her father and mother; Prostate cancer in her father; Stroke in her father.    Polina reports that she has never smoked. She has never used smokeless tobacco. She reports that she does not drink alcohol or use drugs.   ALG Polina is allergic to amoxicillin.   MED Polina has a current medication list which includes the following prescription(s): calcium phosphate trib/vit d3, coenzyme q10, fluticasone propionate, ketotifen, lactobacillus acidophilus, melatonin, multivitamin, vit  "c,o-gx-jvbvw-lutein-zeaxan, alendronate, cimetidine, and escitalopram oxalate.     Review of Systems   Constitutional: Negative for activity change, appetite change, chills, fatigue and fever.   Eyes: Negative for visual disturbance.   Respiratory: Negative for cough and shortness of breath.    Cardiovascular: Negative for chest pain, palpitations and leg swelling.   Gastrointestinal: Negative for abdominal pain.   Skin: Negative for wound.   Neurological: Negative for dizziness and headaches.   Psychiatric/Behavioral: Negative for confusion.     Objective     /79 (BP Location: Left arm, Patient Position: Sitting, BP Method: Medium (Manual))   Pulse 85   Temp 97.7 °F (36.5 °C) (Temporal)   Resp 18   Ht 5' 6" (1.676 m)   Wt 71.2 kg (157 lb)   LMP  (LMP Unknown)   SpO2 98%   BMI 25.34 kg/m²     Physical Exam   Constitutional: She appears well-developed and well-nourished.  Non-toxic appearance. She does not appear ill. No distress.   HENT:   Head: Normocephalic and atraumatic.   Eyes: Right eye exhibits no discharge. Left eye exhibits no discharge.   Neck: Neck supple.   Cardiovascular: Normal rate, regular rhythm, normal heart sounds and normal pulses. Exam reveals no gallop and no friction rub.   No murmur heard.  Pulmonary/Chest: Effort normal and breath sounds normal. No respiratory distress. She has no wheezes. She has no rhonchi. She has no rales.   Abdominal: Normal appearance.   Musculoskeletal:      Right lower leg: No edema.      Left lower leg: No edema.   Lymphadenopathy:     She has no cervical adenopathy.   Neurological: She is alert.   Skin: Skin is warm and dry. Capillary refill takes less than 2 seconds. She is not diaphoretic.   Psychiatric: Her behavior is normal. Mood, judgment and thought content normal.   Vitals reviewed.     Assessment/Plan     Polina was seen today for hot flashes and gastroesophageal reflux.    Diagnoses and all orders for this visit:  -New patient and new problem " to me    Gastroesophageal reflux disease, unspecified whether esophagitis present  -     cimetidine (TAGAMET) 400 MG tablet; Take 1 tablet (400 mg total) by mouth 2 (two) times daily.  - Counseled on triggers and lifestyle modifications. Recommended keeping a food diary to identify triggers.     Vasomotor symptoms due to menopause  -     escitalopram oxalate (LEXAPRO) 10 MG tablet; Take 1 tablet (10 mg total) by mouth once daily.    Encounter for medication monitoring  -     CBC Auto Differential; Future  -     Comprehensive Metabolic Panel; Future    Encounter for lipid screening for cardiovascular disease  -     Lipid Panel; Future    Encounter for screening mammogram for breast cancer  -     Mammo Digital Screening Bilat w/ [place]; Future    Asymptomatic menopausal state  -     DXA Bone Density Spine And Hip; Future    Pneumococcal vaccine administered  -     (In Office Administered) Pneumococcal Polysaccharide Vaccine (23 Valent) (SQ/IM)      Follow up in about 1 month (around 1/2/2021).    Future Appointments   Date Time Provider Department Center   1/7/2021  3:00 PM Noman Eddy MD [facility]   2/4/2021  3:20 PM [facility] MAMMO1 [facility] MAMMO [facility]       Noman Eddy MD  Family Medicine  [facility]

## 2020-12-03 ENCOUNTER — LAB VISIT (OUTPATIENT)
Dept: LAB | Facility: HOSPITAL | Age: 65
End: 2020-12-03
Attending: FAMILY MEDICINE
Payer: MEDICARE

## 2020-12-03 DIAGNOSIS — Z13.220 ENCOUNTER FOR LIPID SCREENING FOR CARDIOVASCULAR DISEASE: ICD-10-CM

## 2020-12-03 DIAGNOSIS — Z51.81 ENCOUNTER FOR MEDICATION MONITORING: ICD-10-CM

## 2020-12-03 DIAGNOSIS — Z13.6 ENCOUNTER FOR LIPID SCREENING FOR CARDIOVASCULAR DISEASE: ICD-10-CM

## 2020-12-03 LAB
ALBUMIN SERPL BCP-MCNC: 4.1 G/DL (ref 3.5–5.2)
ALP SERPL-CCNC: 62 U/L (ref 55–135)
ALT SERPL W/O P-5'-P-CCNC: 16 U/L (ref 10–44)
ANION GAP SERPL CALC-SCNC: 7 MMOL/L (ref 8–16)
AST SERPL-CCNC: 20 U/L (ref 10–40)
BASOPHILS # BLD AUTO: 0.02 K/UL (ref 0–0.2)
BASOPHILS NFR BLD: 0.3 % (ref 0–1.9)
BILIRUB SERPL-MCNC: 1.4 MG/DL (ref 0.1–1)
BUN SERPL-MCNC: 16 MG/DL (ref 8–23)
CALCIUM SERPL-MCNC: 8.4 MG/DL (ref 8.7–10.5)
CHLORIDE SERPL-SCNC: 104 MMOL/L (ref 95–110)
CHOLEST SERPL-MCNC: 180 MG/DL (ref 120–199)
CHOLEST/HDLC SERPL: 3.6 {RATIO} (ref 2–5)
CO2 SERPL-SCNC: 29 MMOL/L (ref 23–29)
CREAT SERPL-MCNC: 0.6 MG/DL (ref 0.5–1.4)
DIFFERENTIAL METHOD: ABNORMAL
EOSINOPHIL # BLD AUTO: 0.1 K/UL (ref 0–0.5)
EOSINOPHIL NFR BLD: 0.9 % (ref 0–8)
ERYTHROCYTE [DISTWIDTH] IN BLOOD BY AUTOMATED COUNT: 13.2 % (ref 11.5–14.5)
EST. GFR  (AFRICAN AMERICAN): >60 ML/MIN/1.73 M^2
EST. GFR  (NON AFRICAN AMERICAN): >60 ML/MIN/1.73 M^2
GLUCOSE SERPL-MCNC: 97 MG/DL (ref 70–110)
HCT VFR BLD AUTO: 40 % (ref 37–48.5)
HDLC SERPL-MCNC: 50 MG/DL (ref 40–75)
HDLC SERPL: 27.8 % (ref 20–50)
HGB BLD-MCNC: 12.7 G/DL (ref 12–16)
IMM GRANULOCYTES # BLD AUTO: 0.03 K/UL (ref 0–0.04)
IMM GRANULOCYTES NFR BLD AUTO: 0.4 % (ref 0–0.5)
LDLC SERPL CALC-MCNC: 109.2 MG/DL (ref 63–159)
LYMPHOCYTES # BLD AUTO: 1.5 K/UL (ref 1–4.8)
LYMPHOCYTES NFR BLD: 18.7 % (ref 18–48)
MCH RBC QN AUTO: 29 PG (ref 27–31)
MCHC RBC AUTO-ENTMCNC: 31.8 G/DL (ref 32–36)
MCV RBC AUTO: 91 FL (ref 82–98)
MONOCYTES # BLD AUTO: 0.4 K/UL (ref 0.3–1)
MONOCYTES NFR BLD: 5.5 % (ref 4–15)
NEUTROPHILS # BLD AUTO: 5.9 K/UL (ref 1.8–7.7)
NEUTROPHILS NFR BLD: 74.2 % (ref 38–73)
NONHDLC SERPL-MCNC: 130 MG/DL
NRBC BLD-RTO: 0 /100 WBC
PLATELET # BLD AUTO: 201 K/UL (ref 150–350)
PMV BLD AUTO: 10.2 FL (ref 9.2–12.9)
POTASSIUM SERPL-SCNC: 4 MMOL/L (ref 3.5–5.1)
PROT SERPL-MCNC: 6.7 G/DL (ref 6–8.4)
RBC # BLD AUTO: 4.38 M/UL (ref 4–5.4)
SODIUM SERPL-SCNC: 140 MMOL/L (ref 136–145)
TRIGL SERPL-MCNC: 104 MG/DL (ref 30–150)
WBC # BLD AUTO: 7.98 K/UL (ref 3.9–12.7)

## 2020-12-03 PROCEDURE — 80053 COMPREHEN METABOLIC PANEL: CPT

## 2020-12-03 PROCEDURE — 36415 COLL VENOUS BLD VENIPUNCTURE: CPT

## 2020-12-03 PROCEDURE — 85025 COMPLETE CBC W/AUTO DIFF WBC: CPT

## 2020-12-03 PROCEDURE — 80061 LIPID PANEL: CPT

## 2020-12-05 ENCOUNTER — PATIENT MESSAGE (OUTPATIENT)
Dept: FAMILY MEDICINE | Facility: CLINIC | Age: 65
End: 2020-12-05

## 2020-12-07 NOTE — TELEPHONE ENCOUNTER
Patient c/o nausea after starting new meds. Doesn't know which med it is and is asking for advice. Please advise.

## 2020-12-09 ENCOUNTER — HOSPITAL ENCOUNTER (OUTPATIENT)
Dept: RADIOLOGY | Facility: HOSPITAL | Age: 65
Discharge: HOME | End: 2020-12-09
Attending: FAMILY MEDICINE
Payer: MEDICARE

## 2020-12-09 DIAGNOSIS — Z78.0 ASYMPTOMATIC MENOPAUSAL STATE: ICD-10-CM

## 2020-12-09 PROCEDURE — 77080 DEXA BONE DENSITY SPINE HIP: ICD-10-PCS | Mod: 26,,, | Performed by: RADIOLOGY

## 2020-12-09 PROCEDURE — 77080 DXA BONE DENSITY AXIAL: CPT | Mod: TC

## 2020-12-09 PROCEDURE — 77080 DXA BONE DENSITY AXIAL: CPT | Mod: 26,,, | Performed by: RADIOLOGY

## 2020-12-11 ENCOUNTER — PATIENT MESSAGE (OUTPATIENT)
Dept: FAMILY MEDICINE | Facility: CLINIC | Age: 65
End: 2020-12-11

## 2020-12-11 DIAGNOSIS — M81.0 AGE-RELATED OSTEOPOROSIS WITHOUT CURRENT PATHOLOGICAL FRACTURE: Primary | ICD-10-CM

## 2020-12-11 RX ORDER — ALENDRONATE SODIUM 70 MG/1
70 TABLET ORAL
Qty: 12 TABLET | Refills: 3 | Status: SHIPPED | OUTPATIENT
Start: 2020-12-11 | End: 2021-05-04 | Stop reason: SDUPTHER

## 2021-01-04 ENCOUNTER — PATIENT MESSAGE (OUTPATIENT)
Dept: ADMINISTRATIVE | Facility: HOSPITAL | Age: 66
End: 2021-01-04

## 2021-01-21 ENCOUNTER — OFFICE VISIT (OUTPATIENT)
Dept: FAMILY MEDICINE | Facility: CLINIC | Age: 66
End: 2021-01-21
Payer: MEDICARE

## 2021-01-21 VITALS
DIASTOLIC BLOOD PRESSURE: 71 MMHG | TEMPERATURE: 98 F | WEIGHT: 156.25 LBS | SYSTOLIC BLOOD PRESSURE: 123 MMHG | BODY MASS INDEX: 25.11 KG/M2 | RESPIRATION RATE: 17 BRPM | HEIGHT: 66 IN | HEART RATE: 80 BPM

## 2021-01-21 DIAGNOSIS — K21.9 GASTROESOPHAGEAL REFLUX DISEASE, UNSPECIFIED WHETHER ESOPHAGITIS PRESENT: Primary | ICD-10-CM

## 2021-01-21 DIAGNOSIS — N95.1 VASOMOTOR SYMPTOMS DUE TO MENOPAUSE: ICD-10-CM

## 2021-01-21 DIAGNOSIS — M81.0 AGE-RELATED OSTEOPOROSIS WITHOUT CURRENT PATHOLOGICAL FRACTURE: ICD-10-CM

## 2021-01-21 PROCEDURE — 99214 OFFICE O/P EST MOD 30 MIN: CPT | Mod: S$GLB,,, | Performed by: FAMILY MEDICINE

## 2021-01-21 PROCEDURE — 99214 PR OFFICE/OUTPT VISIT, EST, LEVL IV, 30-39 MIN: ICD-10-PCS | Mod: S$GLB,,, | Performed by: FAMILY MEDICINE

## 2021-01-21 PROCEDURE — 3078F PR MOST RECENT DIASTOLIC BLOOD PRESSURE < 80 MM HG: ICD-10-PCS | Mod: CPTII,S$GLB,, | Performed by: FAMILY MEDICINE

## 2021-01-21 PROCEDURE — 3078F DIAST BP <80 MM HG: CPT | Mod: CPTII,S$GLB,, | Performed by: FAMILY MEDICINE

## 2021-01-21 PROCEDURE — 1126F PR PAIN SEVERITY QUANTIFIED, NO PAIN PRESENT: ICD-10-PCS | Mod: S$GLB,,, | Performed by: FAMILY MEDICINE

## 2021-01-21 PROCEDURE — 1101F PT FALLS ASSESS-DOCD LE1/YR: CPT | Mod: CPTII,S$GLB,, | Performed by: FAMILY MEDICINE

## 2021-01-21 PROCEDURE — 3074F PR MOST RECENT SYSTOLIC BLOOD PRESSURE < 130 MM HG: ICD-10-PCS | Mod: CPTII,S$GLB,, | Performed by: FAMILY MEDICINE

## 2021-01-21 PROCEDURE — 3008F BODY MASS INDEX DOCD: CPT | Mod: CPTII,S$GLB,, | Performed by: FAMILY MEDICINE

## 2021-01-21 PROCEDURE — 3008F PR BODY MASS INDEX (BMI) DOCUMENTED: ICD-10-PCS | Mod: CPTII,S$GLB,, | Performed by: FAMILY MEDICINE

## 2021-01-21 PROCEDURE — 3074F SYST BP LT 130 MM HG: CPT | Mod: CPTII,S$GLB,, | Performed by: FAMILY MEDICINE

## 2021-01-21 PROCEDURE — 1101F PR PT FALLS ASSESS DOC 0-1 FALLS W/OUT INJ PAST YR: ICD-10-PCS | Mod: CPTII,S$GLB,, | Performed by: FAMILY MEDICINE

## 2021-01-21 PROCEDURE — 3288F FALL RISK ASSESSMENT DOCD: CPT | Mod: CPTII,S$GLB,, | Performed by: FAMILY MEDICINE

## 2021-01-21 PROCEDURE — 1126F AMNT PAIN NOTED NONE PRSNT: CPT | Mod: S$GLB,,, | Performed by: FAMILY MEDICINE

## 2021-01-21 PROCEDURE — 3288F PR FALLS RISK ASSESSMENT DOCUMENTED: ICD-10-PCS | Mod: CPTII,S$GLB,, | Performed by: FAMILY MEDICINE

## 2021-02-04 ENCOUNTER — HOSPITAL ENCOUNTER (OUTPATIENT)
Dept: RADIOLOGY | Facility: HOSPITAL | Age: 66
Discharge: HOME | End: 2021-02-04
Attending: FAMILY MEDICINE
Payer: MEDICARE

## 2021-02-04 ENCOUNTER — PATIENT MESSAGE (OUTPATIENT)
Dept: FAMILY MEDICINE | Facility: CLINIC | Age: 66
End: 2021-02-04

## 2021-02-04 VITALS — HEIGHT: 66 IN | BODY MASS INDEX: 24.91 KG/M2 | WEIGHT: 155 LBS

## 2021-02-04 DIAGNOSIS — Z12.31 ENCOUNTER FOR SCREENING MAMMOGRAM FOR BREAST CANCER: ICD-10-CM

## 2021-02-04 DIAGNOSIS — N95.1 VASOMOTOR SYMPTOMS DUE TO MENOPAUSE: Primary | ICD-10-CM

## 2021-02-04 PROCEDURE — 77063 MAMMO DIGITAL SCREENING BILAT WITH TOMO: ICD-10-PCS | Mod: 26,,, | Performed by: RADIOLOGY

## 2021-02-04 PROCEDURE — 77067 MAMMO DIGITAL SCREENING BILAT WITH TOMO: ICD-10-PCS | Mod: 26,,, | Performed by: RADIOLOGY

## 2021-02-04 PROCEDURE — 77063 BREAST TOMOSYNTHESIS BI: CPT | Mod: 26,,, | Performed by: RADIOLOGY

## 2021-02-04 PROCEDURE — 77067 SCR MAMMO BI INCL CAD: CPT | Mod: 26,,, | Performed by: RADIOLOGY

## 2021-02-04 PROCEDURE — 77067 SCR MAMMO BI INCL CAD: CPT | Mod: TC

## 2021-02-04 RX ORDER — VENLAFAXINE HYDROCHLORIDE 37.5 MG/1
37.5 CAPSULE, EXTENDED RELEASE ORAL DAILY
Qty: 30 CAPSULE | Refills: 3 | Status: SHIPPED | OUTPATIENT
Start: 2021-02-04 | End: 2021-03-23

## 2021-02-05 ENCOUNTER — TELEPHONE (OUTPATIENT)
Dept: FAMILY MEDICINE | Facility: CLINIC | Age: 66
End: 2021-02-05

## 2021-03-04 DIAGNOSIS — Z11.59 NEED FOR HEPATITIS C SCREENING TEST: ICD-10-CM

## 2021-03-19 ENCOUNTER — PATIENT MESSAGE (OUTPATIENT)
Dept: FAMILY MEDICINE | Facility: CLINIC | Age: 66
End: 2021-03-19

## 2021-03-19 DIAGNOSIS — N95.1 VASOMOTOR SYMPTOMS DUE TO MENOPAUSE: Primary | ICD-10-CM

## 2021-03-23 RX ORDER — VENLAFAXINE 37.5 MG/1
TABLET ORAL
Qty: 7 TABLET | Refills: 0 | Status: SHIPPED | OUTPATIENT
Start: 2021-03-23 | End: 2021-10-20

## 2021-05-04 DIAGNOSIS — K21.9 GASTROESOPHAGEAL REFLUX DISEASE, UNSPECIFIED WHETHER ESOPHAGITIS PRESENT: ICD-10-CM

## 2021-05-04 DIAGNOSIS — M81.0 AGE-RELATED OSTEOPOROSIS WITHOUT CURRENT PATHOLOGICAL FRACTURE: ICD-10-CM

## 2021-05-04 RX ORDER — CIMETIDINE 400 MG/1
TABLET, FILM COATED ORAL
Qty: 180 TABLET | Refills: 2 | Status: SHIPPED | OUTPATIENT
Start: 2021-05-04 | End: 2022-03-13

## 2021-05-04 RX ORDER — ALENDRONATE SODIUM 70 MG/1
70 TABLET ORAL
Qty: 12 TABLET | Refills: 3 | Status: SHIPPED | OUTPATIENT
Start: 2021-05-04 | End: 2022-03-13

## 2021-10-20 ENCOUNTER — HOSPITAL ENCOUNTER (OUTPATIENT)
Dept: RADIOLOGY | Facility: HOSPITAL | Age: 66
Discharge: HOME | End: 2021-10-20
Attending: FAMILY MEDICINE
Payer: MEDICARE

## 2021-10-20 ENCOUNTER — OFFICE VISIT (OUTPATIENT)
Dept: FAMILY MEDICINE | Facility: CLINIC | Age: 66
End: 2021-10-20
Payer: MEDICARE

## 2021-10-20 VITALS
HEIGHT: 66 IN | BODY MASS INDEX: 24.99 KG/M2 | SYSTOLIC BLOOD PRESSURE: 132 MMHG | HEART RATE: 96 BPM | WEIGHT: 155.5 LBS | RESPIRATION RATE: 15 BRPM | DIASTOLIC BLOOD PRESSURE: 80 MMHG

## 2021-10-20 DIAGNOSIS — M79.89 SWELLING OF DIGIT OF RIGHT HAND: ICD-10-CM

## 2021-10-20 DIAGNOSIS — F51.01 PRIMARY INSOMNIA: ICD-10-CM

## 2021-10-20 DIAGNOSIS — M79.641 PAIN OF RIGHT HAND: Primary | ICD-10-CM

## 2021-10-20 DIAGNOSIS — M79.641 PAIN OF RIGHT HAND: ICD-10-CM

## 2021-10-20 DIAGNOSIS — R53.82 CHRONIC FATIGUE: ICD-10-CM

## 2021-10-20 DIAGNOSIS — Z79.899 HIGH RISK MEDICATION USE: ICD-10-CM

## 2021-10-20 PROCEDURE — 1126F AMNT PAIN NOTED NONE PRSNT: CPT | Mod: CPTII,,, | Performed by: FAMILY MEDICINE

## 2021-10-20 PROCEDURE — 3288F FALL RISK ASSESSMENT DOCD: CPT | Mod: CPTII,,, | Performed by: FAMILY MEDICINE

## 2021-10-20 PROCEDURE — 73120 XR HAND 2 VIEW RIGHT: ICD-10-PCS | Mod: 26,RT,, | Performed by: RADIOLOGY

## 2021-10-20 PROCEDURE — 1101F PR PT FALLS ASSESS DOC 0-1 FALLS W/OUT INJ PAST YR: ICD-10-PCS | Mod: CPTII,,, | Performed by: FAMILY MEDICINE

## 2021-10-20 PROCEDURE — 3008F PR BODY MASS INDEX (BMI) DOCUMENTED: ICD-10-PCS | Mod: CPTII,,, | Performed by: FAMILY MEDICINE

## 2021-10-20 PROCEDURE — 1159F MED LIST DOCD IN RCRD: CPT | Mod: CPTII,,, | Performed by: FAMILY MEDICINE

## 2021-10-20 PROCEDURE — 1101F PT FALLS ASSESS-DOCD LE1/YR: CPT | Mod: CPTII,,, | Performed by: FAMILY MEDICINE

## 2021-10-20 PROCEDURE — 1159F PR MEDICATION LIST DOCUMENTED IN MEDICAL RECORD: ICD-10-PCS | Mod: CPTII,,, | Performed by: FAMILY MEDICINE

## 2021-10-20 PROCEDURE — 3079F PR MOST RECENT DIASTOLIC BLOOD PRESSURE 80-89 MM HG: ICD-10-PCS | Mod: CPTII,,, | Performed by: FAMILY MEDICINE

## 2021-10-20 PROCEDURE — 1126F PR PAIN SEVERITY QUANTIFIED, NO PAIN PRESENT: ICD-10-PCS | Mod: CPTII,,, | Performed by: FAMILY MEDICINE

## 2021-10-20 PROCEDURE — 99999 PR PBB SHADOW E&M-EST. PATIENT-LVL III: ICD-10-PCS | Mod: PBBFAC,,, | Performed by: FAMILY MEDICINE

## 2021-10-20 PROCEDURE — 3079F DIAST BP 80-89 MM HG: CPT | Mod: CPTII,,, | Performed by: FAMILY MEDICINE

## 2021-10-20 PROCEDURE — 73120 X-RAY EXAM OF HAND: CPT | Mod: 26,RT,, | Performed by: RADIOLOGY

## 2021-10-20 PROCEDURE — 3075F SYST BP GE 130 - 139MM HG: CPT | Mod: CPTII,,, | Performed by: FAMILY MEDICINE

## 2021-10-20 PROCEDURE — 99214 OFFICE O/P EST MOD 30 MIN: CPT | Mod: ,,, | Performed by: FAMILY MEDICINE

## 2021-10-20 PROCEDURE — 99999 PR PBB SHADOW E&M-EST. PATIENT-LVL III: CPT | Mod: PBBFAC,,, | Performed by: FAMILY MEDICINE

## 2021-10-20 PROCEDURE — 73120 X-RAY EXAM OF HAND: CPT | Mod: TC,FY,RT

## 2021-10-20 PROCEDURE — 3008F BODY MASS INDEX DOCD: CPT | Mod: CPTII,,, | Performed by: FAMILY MEDICINE

## 2021-10-20 PROCEDURE — 3288F PR FALLS RISK ASSESSMENT DOCUMENTED: ICD-10-PCS | Mod: CPTII,,, | Performed by: FAMILY MEDICINE

## 2021-10-20 PROCEDURE — 3075F PR MOST RECENT SYSTOLIC BLOOD PRESS GE 130-139MM HG: ICD-10-PCS | Mod: CPTII,,, | Performed by: FAMILY MEDICINE

## 2021-10-20 PROCEDURE — 99214 PR OFFICE/OUTPT VISIT, EST, LEVL IV, 30-39 MIN: ICD-10-PCS | Mod: ,,, | Performed by: FAMILY MEDICINE

## 2021-10-20 RX ORDER — CARBOXYMETHYLCELLULOSE SODIUM 5 MG/ML
1 SOLUTION/ DROPS OPHTHALMIC 3 TIMES DAILY PRN
COMMUNITY

## 2021-10-20 RX ORDER — TEMAZEPAM 15 MG/1
15 CAPSULE ORAL NIGHTLY
Qty: 30 CAPSULE | Refills: 2 | Status: SHIPPED | OUTPATIENT
Start: 2021-10-20 | End: 2021-11-19

## 2021-10-26 ENCOUNTER — PATIENT MESSAGE (OUTPATIENT)
Dept: FAMILY MEDICINE | Facility: CLINIC | Age: 66
End: 2021-10-26
Payer: MEDICARE

## 2021-11-22 ENCOUNTER — PATIENT MESSAGE (OUTPATIENT)
Dept: FAMILY MEDICINE | Facility: CLINIC | Age: 66
End: 2021-11-22
Payer: MEDICARE

## 2022-01-16 ENCOUNTER — PATIENT MESSAGE (OUTPATIENT)
Dept: FAMILY MEDICINE | Facility: CLINIC | Age: 67
End: 2022-01-16
Payer: MEDICARE

## 2022-01-17 RX ORDER — TEMAZEPAM 7.5 MG/1
15 CAPSULE ORAL NIGHTLY PRN
COMMUNITY
End: 2022-01-17 | Stop reason: SDUPTHER

## 2022-01-18 RX ORDER — TEMAZEPAM 7.5 MG/1
15 CAPSULE ORAL NIGHTLY PRN
Qty: 60 CAPSULE | Refills: 3 | Status: SHIPPED | OUTPATIENT
Start: 2022-01-18 | End: 2022-02-08

## 2022-01-25 ENCOUNTER — PATIENT MESSAGE (OUTPATIENT)
Dept: FAMILY MEDICINE | Facility: CLINIC | Age: 67
End: 2022-01-25
Payer: MEDICARE

## 2022-02-08 ENCOUNTER — PATIENT MESSAGE (OUTPATIENT)
Dept: FAMILY MEDICINE | Facility: CLINIC | Age: 67
End: 2022-02-08
Payer: MEDICARE

## 2022-02-08 DIAGNOSIS — F51.01 PRIMARY INSOMNIA: Primary | ICD-10-CM

## 2022-02-08 RX ORDER — TEMAZEPAM 15 MG/1
15 CAPSULE ORAL NIGHTLY
Qty: 90 CAPSULE | Refills: 1 | Status: SHIPPED | OUTPATIENT
Start: 2022-02-08 | End: 2022-03-10

## 2022-02-09 DIAGNOSIS — Z12.31 OTHER SCREENING MAMMOGRAM: ICD-10-CM

## 2022-02-14 ENCOUNTER — PATIENT MESSAGE (OUTPATIENT)
Dept: FAMILY MEDICINE | Facility: CLINIC | Age: 67
End: 2022-02-14
Payer: MEDICARE

## 2022-02-15 ENCOUNTER — HOSPITAL ENCOUNTER (OUTPATIENT)
Dept: RADIOLOGY | Facility: HOSPITAL | Age: 67
Discharge: HOME | End: 2022-02-15
Attending: FAMILY MEDICINE
Payer: MEDICARE

## 2022-02-15 VITALS — WEIGHT: 155 LBS | BODY MASS INDEX: 24.91 KG/M2 | HEIGHT: 66 IN

## 2022-02-15 DIAGNOSIS — Z12.31 OTHER SCREENING MAMMOGRAM: ICD-10-CM

## 2022-02-15 PROCEDURE — 77067 SCR MAMMO BI INCL CAD: CPT | Mod: TC

## 2022-02-15 PROCEDURE — 77063 BREAST TOMOSYNTHESIS BI: CPT | Mod: 26,,, | Performed by: RADIOLOGY

## 2022-02-15 PROCEDURE — 77067 MAMMO DIGITAL SCREENING BILAT WITH TOMO: ICD-10-PCS | Mod: 26,,, | Performed by: RADIOLOGY

## 2022-02-15 PROCEDURE — 77063 MAMMO DIGITAL SCREENING BILAT WITH TOMO: ICD-10-PCS | Mod: 26,,, | Performed by: RADIOLOGY

## 2022-02-15 PROCEDURE — 77067 SCR MAMMO BI INCL CAD: CPT | Mod: 26,,, | Performed by: RADIOLOGY

## 2022-09-26 ENCOUNTER — PATIENT MESSAGE (OUTPATIENT)
Dept: FAMILY MEDICINE | Facility: CLINIC | Age: 67
End: 2022-09-26
Payer: MEDICARE

## 2022-09-26 DIAGNOSIS — K58.9 IRRITABLE BOWEL SYNDROME, UNSPECIFIED TYPE: Primary | ICD-10-CM

## 2022-09-28 ENCOUNTER — PATIENT MESSAGE (OUTPATIENT)
Dept: FAMILY MEDICINE | Facility: CLINIC | Age: 67
End: 2022-09-28
Payer: MEDICARE

## 2022-10-10 ENCOUNTER — OFFICE VISIT (OUTPATIENT)
Dept: GASTROENTEROLOGY | Facility: CLINIC | Age: 67
End: 2022-10-10
Payer: MEDICARE

## 2022-10-10 VITALS
SYSTOLIC BLOOD PRESSURE: 163 MMHG | BODY MASS INDEX: 24.66 KG/M2 | HEIGHT: 66 IN | DIASTOLIC BLOOD PRESSURE: 94 MMHG | WEIGHT: 153.44 LBS | HEART RATE: 84 BPM

## 2022-10-10 DIAGNOSIS — K21.9 GASTROESOPHAGEAL REFLUX DISEASE, UNSPECIFIED WHETHER ESOPHAGITIS PRESENT: ICD-10-CM

## 2022-10-10 DIAGNOSIS — K58.1 IRRITABLE BOWEL SYNDROME WITH CONSTIPATION: Primary | ICD-10-CM

## 2022-10-10 DIAGNOSIS — R14.0 BLOATING: ICD-10-CM

## 2022-10-10 DIAGNOSIS — Z87.898 HISTORY OF VOMITING: ICD-10-CM

## 2022-10-10 DIAGNOSIS — R10.30 LOWER ABDOMINAL PAIN: ICD-10-CM

## 2022-10-10 DIAGNOSIS — R19.8 IRREGULAR BOWEL HABITS: ICD-10-CM

## 2022-10-10 DIAGNOSIS — K64.9 HEMORRHOIDS, UNSPECIFIED HEMORRHOID TYPE: ICD-10-CM

## 2022-10-10 DIAGNOSIS — K92.1 HEMATOCHEZIA: ICD-10-CM

## 2022-10-10 DIAGNOSIS — Z01.818 PRE-OP TESTING: ICD-10-CM

## 2022-10-10 PROCEDURE — 3077F PR MOST RECENT SYSTOLIC BLOOD PRESSURE >= 140 MM HG: ICD-10-PCS | Mod: CPTII,S$GLB,,

## 2022-10-10 PROCEDURE — 3288F FALL RISK ASSESSMENT DOCD: CPT | Mod: CPTII,S$GLB,,

## 2022-10-10 PROCEDURE — 1126F PR PAIN SEVERITY QUANTIFIED, NO PAIN PRESENT: ICD-10-PCS | Mod: CPTII,S$GLB,,

## 2022-10-10 PROCEDURE — 99999 PR PBB SHADOW E&M-EST. PATIENT-LVL V: CPT | Mod: PBBFAC,,,

## 2022-10-10 PROCEDURE — 3288F PR FALLS RISK ASSESSMENT DOCUMENTED: ICD-10-PCS | Mod: CPTII,S$GLB,,

## 2022-10-10 PROCEDURE — 1101F PT FALLS ASSESS-DOCD LE1/YR: CPT | Mod: CPTII,S$GLB,,

## 2022-10-10 PROCEDURE — 3080F DIAST BP >= 90 MM HG: CPT | Mod: CPTII,S$GLB,,

## 2022-10-10 PROCEDURE — 1101F PR PT FALLS ASSESS DOC 0-1 FALLS W/OUT INJ PAST YR: ICD-10-PCS | Mod: CPTII,S$GLB,,

## 2022-10-10 PROCEDURE — 99999 PR PBB SHADOW E&M-EST. PATIENT-LVL V: ICD-10-PCS | Mod: PBBFAC,,,

## 2022-10-10 PROCEDURE — 99214 OFFICE O/P EST MOD 30 MIN: CPT | Mod: S$GLB,,,

## 2022-10-10 PROCEDURE — 3008F PR BODY MASS INDEX (BMI) DOCUMENTED: ICD-10-PCS | Mod: CPTII,S$GLB,,

## 2022-10-10 PROCEDURE — 1159F PR MEDICATION LIST DOCUMENTED IN MEDICAL RECORD: ICD-10-PCS | Mod: CPTII,S$GLB,,

## 2022-10-10 PROCEDURE — 3077F SYST BP >= 140 MM HG: CPT | Mod: CPTII,S$GLB,,

## 2022-10-10 PROCEDURE — 99214 PR OFFICE/OUTPT VISIT, EST, LEVL IV, 30-39 MIN: ICD-10-PCS | Mod: S$GLB,,,

## 2022-10-10 PROCEDURE — 1160F RVW MEDS BY RX/DR IN RCRD: CPT | Mod: CPTII,S$GLB,,

## 2022-10-10 PROCEDURE — 1126F AMNT PAIN NOTED NONE PRSNT: CPT | Mod: CPTII,S$GLB,,

## 2022-10-10 PROCEDURE — 3008F BODY MASS INDEX DOCD: CPT | Mod: CPTII,S$GLB,,

## 2022-10-10 PROCEDURE — 1160F PR REVIEW ALL MEDS BY PRESCRIBER/CLIN PHARMACIST DOCUMENTED: ICD-10-PCS | Mod: CPTII,S$GLB,,

## 2022-10-10 PROCEDURE — 1159F MED LIST DOCD IN RCRD: CPT | Mod: CPTII,S$GLB,,

## 2022-10-10 PROCEDURE — 3080F PR MOST RECENT DIASTOLIC BLOOD PRESSURE >= 90 MM HG: ICD-10-PCS | Mod: CPTII,S$GLB,,

## 2022-10-10 NOTE — PROGRESS NOTES
Subjective:       Patient ID: Polina Griffin is a 66 y.o. female Body mass index is 24.77 kg/m².    Chief Complaint: Constipation and Irritable Bowel Syndrome    This patient is new to me.  Referring Provider: Dr. Noman Eddy for IBS.     GI Problem  The primary symptoms include weight loss (intentional; reports she has been exercising regularly), abdominal pain, vomiting (rarely; occurs after overeating - reports eating smaller more frequent meals with improvement) and hematochezia. Primary symptoms do not include fever, fatigue, nausea, diarrhea, melena, hematemesis, jaundice or dysuria.   The abdominal pain began more than 2 days ago (occurs once montly when constipated & around episodes when she has multiple BMs). The abdominal pain is located in the LLQ and RLQ. The abdominal pain does not radiate. The severity of the abdominal pain is 0/10 (denies pain currently; described as an intermittently spasm). The abdominal pain is relieved by bowel movements (improves after BM).   The hematochezia began more than 1 week ago (reports bleeding occurs during episodes when she has frequent BMs; attributes to hemorrhoids disease and having to wipe multiple times). Frequency: occurred once monthly the past 2 months; reports a moderate amount of BRBPR in stool and on tissue paper with BMs; denies bleeding between BMs, rectal pain, or trauma. The hematochezia is a recurrent problem.   The illness is also significant for bloating (reports taking beano PRN before meals with mild improvement; past: gas-x) and constipation (started becoming more constipation recently; past treatments: fiber; lately having BM every 2-3 days, but has 6-7 BMs in one day at times ~once monthly; stool varies between 2-4 on bristol scale; reports straining). The illness does not include chills, anorexia, dysphagia or odynophagia. Significant associated medical issues include GERD (currently well controlled on Tagmet BID & eating small frequent  meals), irritable bowel syndrome (reports taking bentyl in past, but is unsure it it helped) and hemorrhoids. Associated medical issues do not include inflammatory bowel disease, gallstones, liver disease, alcohol abuse, PUD, gastric bypass, bowel resection or diverticulitis.     Review of Systems   Constitutional:  Positive for weight loss (intentional; reports she has been exercising regularly). Negative for activity change, appetite change, chills, diaphoresis, fatigue, fever and unexpected weight change.   HENT:  Negative for sore throat and trouble swallowing.    Respiratory:  Negative for cough, choking and shortness of breath.    Cardiovascular:  Negative for chest pain.   Gastrointestinal:  Positive for abdominal pain, anal bleeding, bloating (reports taking beano PRN before meals with mild improvement; past: gas-x), constipation (started becoming more constipation recently; past treatments: fiber; lately having BM every 2-3 days, but has 6-7 BMs in one day at times ~once monthly; stool varies between 2-4 on bristol scale; reports straining), hematochezia, rectal pain (denies currently; well managed with preparation H PRN) and vomiting (rarely; occurs after overeating - reports eating smaller more frequent meals with improvement). Negative for abdominal distention, anorexia, blood in stool, diarrhea, dysphagia, hematemesis, jaundice, melena and nausea.   Genitourinary:  Negative for dysuria.       No LMP recorded (lmp unknown). Patient is postmenopausal.  Past Medical History:   Diagnosis Date    Abnormal Pap smear of cervix 2011, 2013    LEEP    Anemia     in her 20's    Esophageal reflux     Fibroids     Fibromatosis of plantar fascia     Insomnia     Irritable bowel syndrome (IBS)     Ocular migraine     Tinnitus     Urge incontinence      Past Surgical History:   Procedure Laterality Date    APPENDECTOMY  2008    CERVICAL BIOPSY  W/ LOOP ELECTRODE EXCISION  2011    COLONOSCOPY N/A 12/11/2017     Procedure: COLONOSCOPY;  Surgeon: Angel Florez MD;  Location: [facility] ([address] [place] [address]);  Service: Endoscopy;  Laterality: N/A;    ENDOMETRIAL BIOPSY  2010    NASAL TURBINATE REDUCTION  2015    SUPERFICIAL KERATECTOMY Right 07/2021    TUBAL LIGATION Bilateral 1980    UPPER GASTROINTESTINAL ENDOSCOPY      WISDOM TOOTH EXTRACTION Bilateral 1973     Family History   Problem Relation Age of Onset    COPD Mother     Hyperlipidemia Mother     Hypertension Mother     Coronary artery disease Mother     Stroke Father     Hypertension Father     Coronary artery disease Father     Heart attack Father     Hyperlipidemia Father     Cancer Father     Prostate cancer Father     Diabetes Brother     Ovarian cancer Neg Hx     Colon cancer Neg Hx     Breast cancer Neg Hx     Crohn's disease Neg Hx     Ulcerative colitis Neg Hx     Stomach cancer Neg Hx     Esophageal cancer Neg Hx     Irritable bowel syndrome Neg Hx     Celiac disease Neg Hx     Rectal cancer Neg Hx     Colon polyps Neg Hx      Social History     Tobacco Use    Smoking status: Never    Smokeless tobacco: Never   Substance Use Topics    Alcohol use: No    Drug use: No     Wt Readings from Last 10 Encounters:   10/10/22 69.6 kg (153 lb 7 oz)   02/15/22 70.3 kg (155 lb)   10/20/21 70.5 kg (155 lb 8 oz)   02/04/21 70.3 kg (154 lb 15.7 oz)   01/21/21 70.9 kg (156 lb 4 oz)   12/02/20 71.2 kg (157 lb)   04/23/19 75.5 kg (166 lb 8 oz)   05/24/18 73.9 kg (163 lb)   05/03/18 74.8 kg (164 lb 14.5 oz)   04/26/18 74.4 kg (164 lb 0.4 oz)     Lab Results   Component Value Date    WBC 7.98 12/03/2020    HGB 12.7 12/03/2020    HCT 40.0 12/03/2020    MCV 91 12/03/2020     12/03/2020     CMP  Sodium   Date Value Ref Range Status   10/20/2021 143 136 - 145 mmol/L Final     Potassium   Date Value Ref Range Status   10/20/2021 4.1 3.5 - 5.1 mmol/L Final     Chloride   Date Value Ref Range Status   10/20/2021 107 95 - 110 mmol/L Final     CO2   Date Value Ref Range Status    10/20/2021 26 23 - 29 mmol/L Final     Glucose   Date Value Ref Range Status   10/20/2021 106 70 - 110 mg/dL Final     BUN   Date Value Ref Range Status   10/20/2021 8 8 - 23 mg/dL Final     Creatinine   Date Value Ref Range Status   10/20/2021 0.7 0.5 - 1.4 mg/dL Final     Calcium   Date Value Ref Range Status   10/20/2021 9.4 8.7 - 10.5 mg/dL Final     Total Protein   Date Value Ref Range Status   12/03/2020 6.7 6.0 - 8.4 g/dL Final     Albumin   Date Value Ref Range Status   12/03/2020 4.1 3.5 - 5.2 g/dL Final     Total Bilirubin   Date Value Ref Range Status   12/03/2020 1.4 (H) 0.1 - 1.0 mg/dL Final     Comment:     For infants and newborns, interpretation of results should be based  on gestational age, weight and in agreement with clinical  observations.  Premature Infant recommended reference ranges:  Up to 24 hours.............<8.0 mg/dL  Up to 48 hours............<12.0 mg/dL  3-5 days..................<15.0 mg/dL  6-29 days.................<15.0 mg/dL       Alkaline Phosphatase   Date Value Ref Range Status   12/03/2020 62 55 - 135 U/L Final     AST   Date Value Ref Range Status   12/03/2020 20 10 - 40 U/L Final     ALT   Date Value Ref Range Status   12/03/2020 16 10 - 44 U/L Final     Anion Gap   Date Value Ref Range Status   10/20/2021 10 8 - 16 mmol/L Final     eGFR if    Date Value Ref Range Status   10/20/2021 >60.0 >60 mL/min/1.73 m^2 Final     eGFR if non    Date Value Ref Range Status   10/20/2021 >60.0 >60 mL/min/1.73 m^2 Final     Comment:     Calculation used to obtain the estimated glomerular filtration  rate (eGFR) is the CKD-EPI equation.        Lab Results   Component Value Date    TSH 2.462 10/20/2021       Reviewed prior medical records including radiology report of vianney c-ray 04/05/18 & endoscopy history (see surgical history).    Objective:      Physical Exam  Vitals and nursing note reviewed.   Constitutional:       General: She is not in acute  distress.     Appearance: Normal appearance. She is normal weight. She is not ill-appearing.   HENT:      Mouth/Throat:      Lips: Pink. No lesions.   Eyes:      Extraocular Movements: Extraocular movements intact.      Pupils: Pupils are equal, round, and reactive to light.   Cardiovascular:      Rate and Rhythm: Normal rate and regular rhythm.      Pulses: Normal pulses.   Pulmonary:      Effort: Pulmonary effort is normal. No respiratory distress.      Breath sounds: Normal breath sounds.   Abdominal:      General: Abdomen is flat. Bowel sounds are normal. There is no distension or abdominal bruit. There are no signs of injury.      Palpations: Abdomen is soft. There is no shifting dullness, fluid wave, hepatomegaly, splenomegaly or mass.      Tenderness: There is no abdominal tenderness. There is no guarding or rebound. Negative signs include Bourne's sign, Rovsing's sign and McBurney's sign.      Hernia: No hernia is present.   Skin:     General: Skin is warm and dry.      Coloration: Skin is not jaundiced or pale.   Neurological:      Mental Status: She is alert and oriented to person, place, and time.   Psychiatric:         Attention and Perception: Attention normal.         Mood and Affect: Mood normal.         Speech: Speech normal.         Behavior: Behavior normal.       Assessment:       1. Irritable bowel syndrome with constipation    2. Irregular bowel habits    3. Lower abdominal pain    4. Hematochezia    5. Bloating    6. Hemorrhoids, unspecified hemorrhoid type    7. Gastroesophageal reflux disease, unspecified whether esophagitis present    8. History of vomiting    9. Pre-op testing        Plan:       Irritable bowel syndrome with constipation  - schedule Colonoscopy, discussed procedure with the patient, including risks and benefits, patient verbalized understanding  Recommend daily exercise as tolerated, adequate water intake (six 8-oz glasses of water daily), and high fiber diet. OTC fiber  supplements are recommended if diet does not reach daily fiber goal (20-30 grams daily), such as Metamucil, Citrucel, or FiberCon (take as directed, separate from other oral medications by >2 hours).  -Recommend taking an OTC stool softener such as Colace as directed to avoid hard stools and straining with bowel movements PRN  -Recommend trying OTC MiraLax once daily (17g PO) as directed  - If no improvement with above recommendations, try intermittently dosed Dulcolax OTC as directed (every 3-4  days) PRN to facilitate bowel movements  -If still no improvement with these measures, call/follow-up  -Handout provided in clinic  -     Comprehensive Metabolic Panel; Future; Expected date: 10/10/2022  -     CBC Auto Differential; Future; Expected date: 10/10/2022  -     Case Request Endoscopy: COLONOSCOPY    Irregular bowel habits  - schedule Colonoscopy, discussed procedure with the patient, including risks and benefits, patient verbalized understanding  -     Case Request Endoscopy: COLONOSCOPY    Lower abdominal pain  - schedule Colonoscopy, discussed procedure with the patient, including risks and benefits, patient verbalized understanding  -Consider abdominal imaging if pain returns    Hematochezia  - schedule Colonoscopy, discussed procedure with the patient, including risks and benefits, patient verbalized understanding  - discussed about different etiologies that can cause rectal bleeding, such as but not limited to diverticulosis, polyps, colon mass, colon inflammation or infection, anal fissure or hemorrhoids.   - You may resume normal activity as long as you feel well.  - Avoid/minimize NSAIDs such as ibuprofen (Advil, Motrin) and naproxen (Aleve and Naprosyn).  - Avoid alcohol.    Bloating  - schedule Colonoscopy, discussed procedure with the patient, including risks and benefits, patient verbalized understanding  - recommend OTC simethicone as directed, such as Phazyme or Gas-x  - recommend low gas diet:  Reduce or eliminate these foods from your diet: Broccoli, Cauliflower, [place] sprouts, Cabbage, Cooked dried beans, Carbonated beverages (sparkling water, soda, beer, champagne)  Other Causes Of Excess Gas Include:   1) EATING TOO FAST or TALKING WHILE YOU CHEW may cause you to swallow air. This increases the amount of gas in the stomach and may worsen your symptoms.  --> Chew each mouthful completely before swallowing. Take your time.  2) OVEREATING may increase the feeling of being bloated and cause more gas.  --> When you are full, stop eating.  3) CONSTIPATION can increase the amount of normal intestinal gas.  --> Avoid constipation by increasing the amount of fiber in your diet by including whole cereal grains, fresh vegetables (except those in the above list) and fresh fruits. High-fiber foods absorb water and carry it out of the body. When increasing the amount of fiber in your diet, you also need to increase the amount of water that you drink. You should drink at least eight 8-ounce glasses of water (two quarts) per day.    Hemorrhoids, unspecified hemorrhoid type  - schedule Colonoscopy, discussed procedure with the patient, including risks and benefits, patient verbalized understanding  - avoid constipation and straining with bowel movements; try using an OTC stool softener as directed and increase fiber in diet (20-30 grams daily)/OTC fiber supplement such as metamucil (take as directed)  - recommend SITZ baths  - possible referral to colorectal surgery if symptoms persist    Gastroesophageal reflux disease, unspecified whether esophagitis present  -avoid large meals, avoid eating within 2-3 hours of bedtime (avoid late night eating & lying down soon after eating), elevate head of bed if nocturnal symptoms are present, smoking cessation (if current smoker), & weight loss (if overweight).   -avoid known foods which trigger reflux symptoms & to minimize/avoid high-fat foods, chocolate, caffeine, citrus,  alcohol, & tomato products.  -avoid/limit use of NSAID's, since they can cause GI upset, bleeding, and/or ulcers. If needed, take with food.   -Continue tagamet BID as prescribed    History of vomiting  -continue with small more frequent meals to avoid vomiting    Pre-op testing  -     COVID-19 Routine Screening; Future    Follow up in about 4 weeks (around 11/7/2022), or if symptoms worsen or fail to improve.      If no improvement in symptoms or symptoms worsen, call/follow-up at clinic or go to ER.        30 minutes of total time spent on the encounter, which includes face to face time and non-face to face time preparing to see the patient (eg, review of tests), Obtaining and/or reviewing separately obtained history, Documenting clinical information in the electronic or other health record, Independently interpreting results (not separately reported) and communicating results to the patient/family/caregiver, or Care coordination (not separately reported).

## 2022-10-13 ENCOUNTER — LAB VISIT (OUTPATIENT)
Dept: LAB | Facility: HOSPITAL | Age: 67
End: 2022-10-13
Payer: MEDICARE

## 2022-10-13 DIAGNOSIS — K58.1 IRRITABLE BOWEL SYNDROME WITH CONSTIPATION: ICD-10-CM

## 2022-10-13 LAB
ALBUMIN SERPL BCP-MCNC: 4.1 G/DL (ref 3.5–5.2)
ALP SERPL-CCNC: 60 U/L (ref 55–135)
ALT SERPL W/O P-5'-P-CCNC: 15 U/L (ref 10–44)
ANION GAP SERPL CALC-SCNC: 11 MMOL/L (ref 8–16)
AST SERPL-CCNC: 21 U/L (ref 10–40)
BASOPHILS # BLD AUTO: 0.02 K/UL (ref 0–0.2)
BASOPHILS NFR BLD: 0.3 % (ref 0–1.9)
BILIRUB SERPL-MCNC: 1.2 MG/DL (ref 0.1–1)
BUN SERPL-MCNC: 8 MG/DL (ref 8–23)
CALCIUM SERPL-MCNC: 10 MG/DL (ref 8.7–10.5)
CHLORIDE SERPL-SCNC: 103 MMOL/L (ref 95–110)
CO2 SERPL-SCNC: 29 MMOL/L (ref 23–29)
CREAT SERPL-MCNC: 0.8 MG/DL (ref 0.5–1.4)
DIFFERENTIAL METHOD: ABNORMAL
EOSINOPHIL # BLD AUTO: 0.1 K/UL (ref 0–0.5)
EOSINOPHIL NFR BLD: 1.2 % (ref 0–8)
ERYTHROCYTE [DISTWIDTH] IN BLOOD BY AUTOMATED COUNT: 13.1 % (ref 11.5–14.5)
EST. GFR  (NO RACE VARIABLE): >60 ML/MIN/1.73 M^2
GLUCOSE SERPL-MCNC: 100 MG/DL (ref 70–110)
HCT VFR BLD AUTO: 41 % (ref 37–48.5)
HGB BLD-MCNC: 13.3 G/DL (ref 12–16)
IMM GRANULOCYTES # BLD AUTO: 0.05 K/UL (ref 0–0.04)
IMM GRANULOCYTES NFR BLD AUTO: 0.8 % (ref 0–0.5)
LYMPHOCYTES # BLD AUTO: 2 K/UL (ref 1–4.8)
LYMPHOCYTES NFR BLD: 34.5 % (ref 18–48)
MCH RBC QN AUTO: 29.3 PG (ref 27–31)
MCHC RBC AUTO-ENTMCNC: 32.4 G/DL (ref 32–36)
MCV RBC AUTO: 90 FL (ref 82–98)
MONOCYTES # BLD AUTO: 0.4 K/UL (ref 0.3–1)
MONOCYTES NFR BLD: 6.3 % (ref 4–15)
NEUTROPHILS # BLD AUTO: 3.4 K/UL (ref 1.8–7.7)
NEUTROPHILS NFR BLD: 56.9 % (ref 38–73)
NRBC BLD-RTO: 0 /100 WBC
PLATELET # BLD AUTO: 212 K/UL (ref 150–450)
PMV BLD AUTO: 10.1 FL (ref 9.2–12.9)
POTASSIUM SERPL-SCNC: 4.3 MMOL/L (ref 3.5–5.1)
PROT SERPL-MCNC: 7 G/DL (ref 6–8.4)
RBC # BLD AUTO: 4.54 M/UL (ref 4–5.4)
SODIUM SERPL-SCNC: 143 MMOL/L (ref 136–145)
WBC # BLD AUTO: 5.91 K/UL (ref 3.9–12.7)

## 2022-10-13 PROCEDURE — 36415 COLL VENOUS BLD VENIPUNCTURE: CPT

## 2022-10-13 PROCEDURE — 85025 COMPLETE CBC W/AUTO DIFF WBC: CPT

## 2022-10-13 PROCEDURE — 80053 COMPREHEN METABOLIC PANEL: CPT

## 2022-11-28 ENCOUNTER — PATIENT MESSAGE (OUTPATIENT)
Dept: ADMINISTRATIVE | Facility: HOSPITAL | Age: 67
End: 2022-11-28
Payer: MEDICARE

## 2022-12-07 ENCOUNTER — ANESTHESIA (OUTPATIENT)
Dept: ENDOSCOPY | Facility: HOSPITAL | Age: 67
End: 2022-12-07
Payer: MEDICARE

## 2022-12-07 ENCOUNTER — HOSPITAL ENCOUNTER (OUTPATIENT)
Facility: HOSPITAL | Age: 67
Discharge: HOME | End: 2022-12-07
Attending: INTERNAL MEDICINE | Admitting: INTERNAL MEDICINE
Payer: MEDICARE

## 2022-12-07 ENCOUNTER — ANESTHESIA EVENT (OUTPATIENT)
Dept: ENDOSCOPY | Facility: HOSPITAL | Age: 67
End: 2022-12-07
Payer: MEDICARE

## 2022-12-07 VITALS
BODY MASS INDEX: 24.59 KG/M2 | RESPIRATION RATE: 18 BRPM | WEIGHT: 153 LBS | SYSTOLIC BLOOD PRESSURE: 149 MMHG | HEART RATE: 79 BPM | DIASTOLIC BLOOD PRESSURE: 69 MMHG | OXYGEN SATURATION: 97 % | HEIGHT: 66 IN | TEMPERATURE: 98 F

## 2022-12-07 DIAGNOSIS — R19.4 CHANGE IN BOWEL HABITS: ICD-10-CM

## 2022-12-07 PROCEDURE — D9220A PRA ANESTHESIA: Mod: ANES,,, | Performed by: ANESTHESIOLOGY

## 2022-12-07 PROCEDURE — 37000008 HC ANESTHESIA 1ST 15 MINUTES: Performed by: INTERNAL MEDICINE

## 2022-12-07 PROCEDURE — 25000003 PHARM REV CODE 250: Performed by: NURSE ANESTHETIST, CERTIFIED REGISTERED

## 2022-12-07 PROCEDURE — D9220A PRA ANESTHESIA: ICD-10-PCS | Mod: CRNA,,, | Performed by: NURSE ANESTHETIST, CERTIFIED REGISTERED

## 2022-12-07 PROCEDURE — D9220A PRA ANESTHESIA: Mod: CRNA,,, | Performed by: NURSE ANESTHETIST, CERTIFIED REGISTERED

## 2022-12-07 PROCEDURE — 45378 DIAGNOSTIC COLONOSCOPY: CPT | Mod: ,,, | Performed by: INTERNAL MEDICINE

## 2022-12-07 PROCEDURE — 45378 PR COLONOSCOPY,DIAGNOSTIC: ICD-10-PCS | Mod: ,,, | Performed by: INTERNAL MEDICINE

## 2022-12-07 PROCEDURE — 37000009 HC ANESTHESIA EA ADD 15 MINS: Performed by: INTERNAL MEDICINE

## 2022-12-07 PROCEDURE — 45378 DIAGNOSTIC COLONOSCOPY: CPT | Performed by: INTERNAL MEDICINE

## 2022-12-07 PROCEDURE — 63600175 PHARM REV CODE 636 W HCPCS: Performed by: NURSE ANESTHETIST, CERTIFIED REGISTERED

## 2022-12-07 PROCEDURE — D9220A PRA ANESTHESIA: ICD-10-PCS | Mod: ANES,,, | Performed by: ANESTHESIOLOGY

## 2022-12-07 RX ORDER — PROPOFOL 10 MG/ML
VIAL (ML) INTRAVENOUS
Status: DISCONTINUED | OUTPATIENT
Start: 2022-12-07 | End: 2022-12-07

## 2022-12-07 RX ORDER — SODIUM CHLORIDE 9 MG/ML
INJECTION, SOLUTION INTRAVENOUS CONTINUOUS
Status: DISCONTINUED | OUTPATIENT
Start: 2022-12-07 | End: 2022-12-07 | Stop reason: HOSPADM

## 2022-12-07 RX ORDER — LIDOCAINE HCL/PF 100 MG/5ML
SYRINGE (ML) INTRAVENOUS
Status: DISCONTINUED | OUTPATIENT
Start: 2022-12-07 | End: 2022-12-07

## 2022-12-07 RX ADMIN — PROPOFOL 100 MG: 10 INJECTION, EMULSION INTRAVENOUS at 09:12

## 2022-12-07 RX ADMIN — PROPOFOL 50 MG: 10 INJECTION, EMULSION INTRAVENOUS at 09:12

## 2022-12-07 RX ADMIN — LIDOCAINE HYDROCHLORIDE 100 MG: 20 INJECTION INTRAVENOUS at 09:12

## 2022-12-07 NOTE — H&P
"[facility] Note    CC: Change in bowel habits    HPI 67 y.o. female presents for change in bowel habits    Past Medical History:   Diagnosis Date    Abnormal Pap smear of cervix 2011, 2013    LEEP    Anemia     in her 20's    Esophageal reflux     Fibroids     Fibromatosis of plantar fascia     Insomnia     Irritable bowel syndrome (IBS)     Ocular migraine     Tinnitus     Urge incontinence        Allergies and Medications reviewed     Review of Systems  General ROS: negative for - chills, fever or weight loss  Cardiovascular ROS: no chest pain or dyspnea on exertion  Gastrointestinal ROS: + change in bowel habits    Physical Examination  BP (!) 186/86 (BP Location: Left arm, Patient Position: Lying)   Pulse 86   Temp 97.9 °F (36.6 °C) (Skin)   Resp 18   Ht 5' 6" (1.676 m)   Wt 69.4 kg (153 lb)   LMP  (LMP Unknown)   SpO2 97%   Breastfeeding No   BMI 24.69 kg/m²   General appearance: alert, cooperative, no distress  HENT: Normocephalic, atraumatic, neck symmetrical, no nasal discharge, sclera anicteric   Lungs: clear to auscultation bilaterally, symmetric chest wall expansion bilaterally  Heart: regular rate and rhythm without rub; no displacement of the PMI   Abdomen: soft  Extremities: extremities symmetric; no clubbing, cyanosis, or edema        Labs:  Lab Results   Component Value Date    WBC 5.91 10/13/2022    HGB 13.3 10/13/2022    HCT 41.0 10/13/2022    MCV 90 10/13/2022     10/13/2022         Assessment:   67 y.o. female presents for colonoscopy    Plan:  -Proceed to colonoscopy     Elle Armenta MD  [facility]  [address], [address]  [place] [address]  Office: [phone]  Fax: [phone]   "

## 2022-12-07 NOTE — ANESTHESIA POSTPROCEDURE EVALUATION
Anesthesia Post Evaluation    Patient: Polina Griffin    Procedure(s) Performed: Procedure(s) (LRB):  COLONOSCOPY (N/A)    Final Anesthesia Type: general      Patient location during evaluation: PACU  Patient participation: Yes- Able to Participate  Level of consciousness: awake and alert  Post-procedure vital signs: reviewed and stable  Pain management: adequate  Airway patency: patent    PONV status at discharge: No PONV  Anesthetic complications: no      Cardiovascular status: hemodynamically stable  Respiratory status: unassisted and room air  Hydration status: euvolemic  Follow-up not needed.          Vitals Value Taken Time   /69 12/07/22 0949   Temp 36.6 °C (97.9 °F) 12/07/22 0949   Pulse 79 12/07/22 0949   Resp 18 12/07/22 0949   SpO2 97 % 12/07/22 0949         Event Time   Out of Recovery 09:50:11         Pain/Stephanie Score: Stephanie Score: 10 (12/7/2022  9:48 AM)

## 2022-12-07 NOTE — PLAN OF CARE
Vss, bayron po fluids, denies pain, ambulates easily. IV removed, catheter intact. Discharge instructions provided and states understanding. States ready to go home.  Discharged from facility with family per wheelchair.

## 2022-12-07 NOTE — ANESTHESIA PREPROCEDURE EVALUATION
12/07/2022  Polina Griffin is a 67 y.o., female.      Pre-op Assessment    I have reviewed the Patient Summary Reports.     I have reviewed the Nursing Notes. I have reviewed the NPO Status.   I have reviewed the Medications.     Review of Systems  Anesthesia Hx:  No problems with previous Anesthesia    Social:  Non-Smoker    Hematology/Oncology:     Oncology Normal    -- Anemia:   EENT/Dental:EENT/Dental Normal   Cardiovascular:   Hypertension    Pulmonary:  Pulmonary Normal    Hepatic/GI:   GERD    Musculoskeletal:  Musculoskeletal Normal    Neurological:   Headaches    Endocrine:  Endocrine Normal    Dermatological:  Skin Normal    Psych:  Psychiatric Normal           Physical Exam  General: Well nourished, Cooperative, Alert and Oriented    Airway:  Mallampati: II   Mouth Opening: Normal  TM Distance: Normal  Neck ROM: Normal ROM    Dental:  Intact        Anesthesia Plan  Type of Anesthesia, risks & benefits discussed:    Anesthesia Type: Gen Natural Airway  Intra-op Monitoring Plan: Standard ASA Monitors  Induction:  IV  Informed Consent: Informed consent signed with the Patient and all parties understand the risks and agree with anesthesia plan.  All questions answered.   ASA Score: 2  Day of Surgery Review of History & Physical: H&P Update referred to the surgeon/provider.    Ready For Surgery From Anesthesia Perspective.     .

## 2022-12-07 NOTE — TRANSFER OF CARE
"Anesthesia Transfer of Care Note    Patient: Polina Griffin    Procedure(s) Performed: Procedure(s) (LRB):  COLONOSCOPY (N/A)    Patient location: GI    Anesthesia Type: general    Transport from OR: Transported from OR on room air with adequate spontaneous ventilation    Post pain: adequate analgesia    Post assessment: no apparent anesthetic complications and tolerated procedure well    Post vital signs: stable    Level of consciousness: awake, alert and oriented    Nausea/Vomiting: no nausea/vomiting    Complications: none    Transfer of care protocol was followed      Last vitals:   Visit Vitals  /64   Pulse 73   Temp 36.6 °C (97.9 °F) (Skin)   Resp 17   Ht 5' 6" (1.676 m)   Wt 69.4 kg (153 lb)   LMP  (LMP Unknown)   SpO2 99%   Breastfeeding No   BMI 24.69 kg/m²     "

## 2022-12-07 NOTE — PROVATION PATIENT INSTRUCTIONS
Discharge Summary/Instructions after an Endoscopic Procedure  Patient Name: Polina Griffin  Patient MRN: [id number]  Patient YOB: 1955 Wednesday, December 7, 2022  Elle Armenta MD  Dear patient,  As a result of recent federal legislation (The Federal Cures Act), you may   receive lab or pathology results from your procedure in your [facility]   account before your physician is able to contact you. Your physician or   their representative will relay the results to you with their   recommendations at their soonest availability.  Thank you,  RESTRICTIONS:  During your procedure today, you received medications for sedation.  These   medications may affect your judgment, balance and coordination.  Therefore,   for 24 hours, you have the following restrictions:   - DO NOT drive a car, operate machinery, make legal/financial decisions,   sign important papers or drink alcohol.    ACTIVITY:  Today: no heavy lifting, straining or running due to procedural   sedation/anesthesia.  The following day: return to full activity including work.  DIET:  Eat and drink normally unless instructed otherwise.     TREATMENT FOR COMMON SIDE EFFECTS:  - Mild abdominal pain, nausea, belching, bloating or excessive gas:  rest,   eat lightly and use a heating pad.  - Sore Throat: treat with throat lozenges and/or gargle with warm salt   water.  - Because air was used during the procedure, expelling large amounts of air   from your rectum or belching is normal.  - If a bowel prep was taken, you may not have a bowel movement for 1-3 days.    This is normal.  SYMPTOMS TO WATCH FOR AND REPORT TO YOUR PHYSICIAN:  1. Abdominal pain or bloating, other than gas cramps.  2. Chest pain.  3. Back pain.  4. Signs of infection such as: chills or fever occurring within 24 hours   after the procedure.  5. Rectal bleeding, which would show as bright red, maroon, or black stools.   (A tablespoon of blood from the rectum is not  serious, especially if   hemorrhoids are present.)  6. Vomiting.  7. Weakness or dizziness.  GO DIRECTLY TO THE NEAREST EMERGENCY ROOM IF YOU HAVE ANY OF THE FOLLOWING:      Difficulty breathing              Chills and/or fever over 101 F   Persistent vomiting and/or vomiting blood   Severe abdominal pain   Severe chest pain   Black, tarry stools   Bleeding- more than one tablespoon   Any other symptom or condition that you feel may need urgent attention  Your doctor recommends these additional instructions:  If any biopsies were taken, your doctors clinic will contact you in 1 to 2   weeks with any results.  - Discharge patient to home (with escort).   - Patient has a contact number available for emergencies.  The signs and   symptoms of potential delayed complications were discussed with the   patient.  Return to normal activities tomorrow.  Written discharge   instructions were provided to the patient.   - Resume previous diet.   - Continue present medications.   - No repeat colonoscopy due to current age (66 years or older) and the   absence of colonic polyps.   -Trial of Ibgard  - Return to my office in 3 months.  For questions, problems or results please call your physician - Elle Armenta MD at Work:  [phone].  [facility], EMERGENCY ROOM PHONE NUMBER: [phone]  IF A COMPLICATION OR EMERGENCY SITUATION ARISES AND YOU ARE UNABLE TO REACH   YOUR PHYSICIAN - GO DIRECTLY TO THE EMERGENCY ROOM.  Elle Armenta MD  12/7/2022 9:34:21 AM  This report has been verified and signed electronically.  Dear patient,  As a result of recent federal legislation (The Federal Cures Act), you may   receive lab or pathology results from your procedure in your [facility]   account before your physician is able to contact you. Your physician or   their representative will relay the results to you with their   recommendations at their soonest availability.  Thank you,  PROVATION

## 2022-12-08 ENCOUNTER — OFFICE VISIT (OUTPATIENT)
Dept: FAMILY MEDICINE | Facility: CLINIC | Age: 67
End: 2022-12-08
Payer: MEDICARE

## 2022-12-08 VITALS
BODY MASS INDEX: 24.27 KG/M2 | HEIGHT: 66 IN | RESPIRATION RATE: 20 BRPM | DIASTOLIC BLOOD PRESSURE: 50 MMHG | WEIGHT: 151 LBS | HEART RATE: 79 BPM | SYSTOLIC BLOOD PRESSURE: 120 MMHG | OXYGEN SATURATION: 98 %

## 2022-12-08 DIAGNOSIS — Z00.00 ANNUAL PHYSICAL EXAM: Primary | ICD-10-CM

## 2022-12-08 DIAGNOSIS — M81.0 AGE-RELATED OSTEOPOROSIS WITHOUT CURRENT PATHOLOGICAL FRACTURE: ICD-10-CM

## 2022-12-08 DIAGNOSIS — F51.01 PRIMARY INSOMNIA: ICD-10-CM

## 2022-12-08 PROCEDURE — 3008F PR BODY MASS INDEX (BMI) DOCUMENTED: ICD-10-PCS | Mod: CPTII,S$GLB,, | Performed by: FAMILY MEDICINE

## 2022-12-08 PROCEDURE — 1159F MED LIST DOCD IN RCRD: CPT | Mod: CPTII,S$GLB,, | Performed by: FAMILY MEDICINE

## 2022-12-08 PROCEDURE — 99999 PR PBB SHADOW E&M-EST. PATIENT-LVL III: ICD-10-PCS | Mod: PBBFAC,,, | Performed by: FAMILY MEDICINE

## 2022-12-08 PROCEDURE — 3074F PR MOST RECENT SYSTOLIC BLOOD PRESSURE < 130 MM HG: ICD-10-PCS | Mod: CPTII,S$GLB,, | Performed by: FAMILY MEDICINE

## 2022-12-08 PROCEDURE — 3078F PR MOST RECENT DIASTOLIC BLOOD PRESSURE < 80 MM HG: ICD-10-PCS | Mod: CPTII,S$GLB,, | Performed by: FAMILY MEDICINE

## 2022-12-08 PROCEDURE — 99999 PR PBB SHADOW E&M-EST. PATIENT-LVL III: CPT | Mod: PBBFAC,,, | Performed by: FAMILY MEDICINE

## 2022-12-08 PROCEDURE — 1101F PT FALLS ASSESS-DOCD LE1/YR: CPT | Mod: CPTII,S$GLB,, | Performed by: FAMILY MEDICINE

## 2022-12-08 PROCEDURE — 3008F BODY MASS INDEX DOCD: CPT | Mod: CPTII,S$GLB,, | Performed by: FAMILY MEDICINE

## 2022-12-08 PROCEDURE — 99397 PER PM REEVAL EST PAT 65+ YR: CPT | Mod: S$GLB,,, | Performed by: FAMILY MEDICINE

## 2022-12-08 PROCEDURE — 3078F DIAST BP <80 MM HG: CPT | Mod: CPTII,S$GLB,, | Performed by: FAMILY MEDICINE

## 2022-12-08 PROCEDURE — 3288F FALL RISK ASSESSMENT DOCD: CPT | Mod: CPTII,S$GLB,, | Performed by: FAMILY MEDICINE

## 2022-12-08 PROCEDURE — 99397 PR PREVENTIVE VISIT,EST,65 & OVER: ICD-10-PCS | Mod: S$GLB,,, | Performed by: FAMILY MEDICINE

## 2022-12-08 PROCEDURE — 3288F PR FALLS RISK ASSESSMENT DOCUMENTED: ICD-10-PCS | Mod: CPTII,S$GLB,, | Performed by: FAMILY MEDICINE

## 2022-12-08 PROCEDURE — 1159F PR MEDICATION LIST DOCUMENTED IN MEDICAL RECORD: ICD-10-PCS | Mod: CPTII,S$GLB,, | Performed by: FAMILY MEDICINE

## 2022-12-08 PROCEDURE — 1101F PR PT FALLS ASSESS DOC 0-1 FALLS W/OUT INJ PAST YR: ICD-10-PCS | Mod: CPTII,S$GLB,, | Performed by: FAMILY MEDICINE

## 2022-12-08 PROCEDURE — 3074F SYST BP LT 130 MM HG: CPT | Mod: CPTII,S$GLB,, | Performed by: FAMILY MEDICINE

## 2022-12-08 NOTE — PROGRESS NOTES
[facility] - Clinic Note    Subjective      Ms. Griffin is a 67 y.o. female who presents to clinic for an annual.    Insomnia: takes restoril which helps.     GERD: takes tagamet 400mg BID. Finds relief.     Had a DEXA scan 12/9/2020 revealed osteoporosis with a 11.6% risk of a major osteoporotic fracture and a 2.0% risk of hip fracture in the next 10 years (FRAX).  Started on fosamax weekly 1/2020 but reports that she stopped this medication 2 months ago as she started having generalized joint pain and read this was a side effect.   She works out at the gym and is taking calcium and vitamin D3 supplementation.    [facility] Polina has a past medical history of Abnormal Pap smear of cervix (2011, 2013), Anemia, Esophageal reflux, Fibroids, Fibromatosis of plantar fascia, Insomnia, Irritable bowel syndrome (IBS), Ocular migraine, Tinnitus, and Urge incontinence.   PSX Polina has a past surgical history that includes [place] tooth extraction (Bilateral, 1973); Tubal ligation (Bilateral, 1980); Endometrial biopsy (2010); Nasal turbinate reduction (2015); Appendectomy (2008); Cervical biopsy w/ loop electrode excision (2011); Colonoscopy (N/A, 12/11/2017); Superficial keratectomy (Right, 07/2021); Upper gastrointestinal endoscopy; Cataract extraction (Bilateral, 2021); and Colonoscopy (N/A, 12/7/2022).    Polina's family history includes COPD in her mother; Cancer in her father; Coronary artery disease in her father and mother; Diabetes in her brother; Heart attack in her father; Hyperlipidemia in her father and mother; Hypertension in her father and mother; Prostate cancer in her father; Stroke in her father.    Polina reports that she has never smoked. She has never used smokeless tobacco. She reports that she does not drink alcohol and does not use drugs.   ALG Polina is allergic to amoxicillin.   MED Polina has a current medication list which includes the following prescription(s): alendronate, alpha-d-galactosidase,  "azelastine hcl, calcium phosphate trib/vit d3, carboxymethylcellulose, cimetidine, coenzyme q10, fluticasone propionate, lactobacillus acidophilus, melatonin, multivitamin, temazepam, and vit c,y-fa-hywzl-lutein-zeaxan.     Review of Systems   Constitutional:  Negative for activity change, appetite change, chills, fatigue and fever.   Eyes:  Negative for visual disturbance.   Respiratory:  Negative for cough and shortness of breath.    Cardiovascular:  Negative for chest pain, palpitations and leg swelling.   Gastrointestinal:  Negative for abdominal pain, nausea and vomiting.   Musculoskeletal:  Positive for arthralgias.   Skin:  Negative for wound.   Neurological:  Negative for dizziness and headaches.   Psychiatric/Behavioral:  Negative for confusion.    Objective     BP (!) 120/50 (BP Location: Right arm, Patient Position: Sitting, BP Method: Medium (Manual))   Pulse 79   Resp 20   Ht 5' 6" (1.676 m)   Wt 68.5 kg (151 lb)   LMP  (LMP Unknown)   SpO2 98%   BMI 24.37 kg/m²     Physical Exam   Constitutional: normal appearance. She appears well-developed and well-nourished.  Non-toxic appearance. No distress. She does not appear ill.   HENT:   Head: Normocephalic and atraumatic.   Eyes: Right eye exhibits no discharge. Left eye exhibits no discharge.   Cardiovascular: Normal rate, regular rhythm, normal heart sounds and normal pulses. Exam reveals no gallop and no friction rub.   No murmur heard.Pulmonary:      Effort: Pulmonary effort is normal. No respiratory distress.      Breath sounds: Normal breath sounds. No wheezing, rhonchi or rales.     Abdominal: Normal appearance.   Musculoskeletal:      Cervical back: Neck supple.   Lymphadenopathy:     She has no cervical adenopathy.   Neurological: She is alert.   Skin: Skin is warm and dry. Capillary refill takes less than 2 seconds. She is not diaphoretic.   Psychiatric: Her behavior is normal. Mood, judgment and thought content normal.   Vitals reviewed. "   Assessment/Plan     Polina was seen today for annual exam.    Diagnoses and all orders for this visit:    Annual physical exam    Primary insomnia    Age-related osteoporosis without current pathological fracture    --chronic conditions stable. Continue current regimen.  -discussed risks and benefits of not being on treatment for osteoporosis. Patient would like to hold off at this time on bisphosphates and just continue calcium and D3 supplementation and resistance training. She is due for a repeat DEXA in 1 year.    Follow up in about 1 year (around 12/8/2023), or if symptoms worsen or fail to improve.      Noman Eddy MD  Family Medicine  [facility]

## 2023-01-20 ENCOUNTER — PATIENT MESSAGE (OUTPATIENT)
Dept: FAMILY MEDICINE | Facility: CLINIC | Age: 68
End: 2023-01-20
Payer: MEDICARE

## 2023-04-15 ENCOUNTER — PATIENT MESSAGE (OUTPATIENT)
Dept: FAMILY MEDICINE | Facility: CLINIC | Age: 68
End: 2023-04-15
Payer: MEDICARE

## 2023-04-15 DIAGNOSIS — F51.01 PRIMARY INSOMNIA: Primary | ICD-10-CM

## 2023-04-17 NOTE — TELEPHONE ENCOUNTER
Good morning Dr. Minor in the absence of Noman Eddy MD, can you please assist with addressing this.     Thank you for your assistance,  ANGEL Vivas

## 2023-04-18 RX ORDER — TEMAZEPAM 7.5 MG/1
7.5 CAPSULE ORAL NIGHTLY PRN
Qty: 30 CAPSULE | Refills: 0 | Status: SHIPPED | OUTPATIENT
Start: 2023-04-18 | End: 2023-05-18

## 2023-04-18 NOTE — TELEPHONE ENCOUNTER
See portal message for prescription request and response.    Can you schedule for a sooner visit if possible?    Thanks,  Dr. EASON

## 2023-07-13 ENCOUNTER — PATIENT MESSAGE (OUTPATIENT)
Dept: FAMILY MEDICINE | Facility: CLINIC | Age: 68
End: 2023-07-13
Payer: MEDICARE

## 2023-07-13 DIAGNOSIS — G89.29 CHRONIC PAIN OF RIGHT WRIST: Primary | ICD-10-CM

## 2023-07-13 DIAGNOSIS — M25.531 CHRONIC PAIN OF RIGHT WRIST: Primary | ICD-10-CM

## 2023-07-19 ENCOUNTER — HOSPITAL ENCOUNTER (OUTPATIENT)
Dept: RADIOLOGY | Facility: HOSPITAL | Age: 68
Discharge: HOME | End: 2023-07-19
Attending: FAMILY MEDICINE
Payer: MEDICARE

## 2023-07-19 DIAGNOSIS — G89.29 CHRONIC PAIN OF RIGHT WRIST: ICD-10-CM

## 2023-07-19 DIAGNOSIS — M25.531 CHRONIC PAIN OF RIGHT WRIST: ICD-10-CM

## 2023-07-19 PROCEDURE — 73110 XR WRIST COMPLETE 3 VIEWS RIGHT: ICD-10-PCS | Mod: 26,RT,, | Performed by: RADIOLOGY

## 2023-07-19 PROCEDURE — 73110 X-RAY EXAM OF WRIST: CPT | Mod: TC,RT

## 2023-07-19 PROCEDURE — 73110 X-RAY EXAM OF WRIST: CPT | Mod: 26,RT,, | Performed by: RADIOLOGY

## 2023-08-29 ENCOUNTER — OFFICE VISIT (OUTPATIENT)
Dept: FAMILY MEDICINE | Facility: CLINIC | Age: 68
End: 2023-08-29
Payer: MEDICARE

## 2023-08-29 ENCOUNTER — PATIENT MESSAGE (OUTPATIENT)
Dept: FAMILY MEDICINE | Facility: CLINIC | Age: 68
End: 2023-08-29

## 2023-08-29 DIAGNOSIS — Z00.00 ANNUAL PHYSICAL EXAM: Primary | ICD-10-CM

## 2023-08-29 DIAGNOSIS — Z12.31 SCREENING MAMMOGRAM FOR BREAST CANCER: ICD-10-CM

## 2023-08-29 DIAGNOSIS — M81.0 AGE-RELATED OSTEOPOROSIS WITHOUT CURRENT PATHOLOGICAL FRACTURE: ICD-10-CM

## 2023-08-29 DIAGNOSIS — M25.531 CHRONIC PAIN OF RIGHT WRIST: ICD-10-CM

## 2023-08-29 DIAGNOSIS — G89.29 CHRONIC PAIN OF RIGHT WRIST: ICD-10-CM

## 2023-08-29 DIAGNOSIS — Z51.81 ENCOUNTER FOR MEDICATION MONITORING: ICD-10-CM

## 2023-08-29 DIAGNOSIS — Z13.6 ENCOUNTER FOR LIPID SCREENING FOR CARDIOVASCULAR DISEASE: ICD-10-CM

## 2023-08-29 DIAGNOSIS — Z13.220 ENCOUNTER FOR LIPID SCREENING FOR CARDIOVASCULAR DISEASE: ICD-10-CM

## 2023-08-29 PROCEDURE — 3288F PR FALLS RISK ASSESSMENT DOCUMENTED: ICD-10-PCS | Mod: CPTII,S$GLB,, | Performed by: FAMILY MEDICINE

## 2023-08-29 PROCEDURE — 99397 PR PREVENTIVE VISIT,EST,65 & OVER: ICD-10-PCS | Mod: S$GLB,,, | Performed by: FAMILY MEDICINE

## 2023-08-29 PROCEDURE — 99999 PR PBB SHADOW E&M-EST. PATIENT-LVL IV: CPT | Mod: PBBFAC,,, | Performed by: FAMILY MEDICINE

## 2023-08-29 PROCEDURE — 3074F PR MOST RECENT SYSTOLIC BLOOD PRESSURE < 130 MM HG: ICD-10-PCS | Mod: CPTII,S$GLB,, | Performed by: FAMILY MEDICINE

## 2023-08-29 PROCEDURE — 3078F DIAST BP <80 MM HG: CPT | Mod: CPTII,S$GLB,, | Performed by: FAMILY MEDICINE

## 2023-08-29 PROCEDURE — 3008F BODY MASS INDEX DOCD: CPT | Mod: CPTII,S$GLB,, | Performed by: FAMILY MEDICINE

## 2023-08-29 PROCEDURE — 3078F PR MOST RECENT DIASTOLIC BLOOD PRESSURE < 80 MM HG: ICD-10-PCS | Mod: CPTII,S$GLB,, | Performed by: FAMILY MEDICINE

## 2023-08-29 PROCEDURE — 1125F AMNT PAIN NOTED PAIN PRSNT: CPT | Mod: CPTII,S$GLB,, | Performed by: FAMILY MEDICINE

## 2023-08-29 PROCEDURE — 1101F PT FALLS ASSESS-DOCD LE1/YR: CPT | Mod: CPTII,S$GLB,, | Performed by: FAMILY MEDICINE

## 2023-08-29 PROCEDURE — 3008F PR BODY MASS INDEX (BMI) DOCUMENTED: ICD-10-PCS | Mod: CPTII,S$GLB,, | Performed by: FAMILY MEDICINE

## 2023-08-29 PROCEDURE — 99999 PR PBB SHADOW E&M-EST. PATIENT-LVL IV: ICD-10-PCS | Mod: PBBFAC,,, | Performed by: FAMILY MEDICINE

## 2023-08-29 PROCEDURE — 1159F PR MEDICATION LIST DOCUMENTED IN MEDICAL RECORD: ICD-10-PCS | Mod: CPTII,S$GLB,, | Performed by: FAMILY MEDICINE

## 2023-08-29 PROCEDURE — 1125F PR PAIN SEVERITY QUANTIFIED, PAIN PRESENT: ICD-10-PCS | Mod: CPTII,S$GLB,, | Performed by: FAMILY MEDICINE

## 2023-08-29 PROCEDURE — 1101F PR PT FALLS ASSESS DOC 0-1 FALLS W/OUT INJ PAST YR: ICD-10-PCS | Mod: CPTII,S$GLB,, | Performed by: FAMILY MEDICINE

## 2023-08-29 PROCEDURE — 3074F SYST BP LT 130 MM HG: CPT | Mod: CPTII,S$GLB,, | Performed by: FAMILY MEDICINE

## 2023-08-29 PROCEDURE — 3288F FALL RISK ASSESSMENT DOCD: CPT | Mod: CPTII,S$GLB,, | Performed by: FAMILY MEDICINE

## 2023-08-29 PROCEDURE — 99397 PER PM REEVAL EST PAT 65+ YR: CPT | Mod: S$GLB,,, | Performed by: FAMILY MEDICINE

## 2023-08-29 PROCEDURE — 1159F MED LIST DOCD IN RCRD: CPT | Mod: CPTII,S$GLB,, | Performed by: FAMILY MEDICINE

## 2023-08-29 NOTE — PROGRESS NOTES
[facility] - Clinic Note    Subjective      Ms. Griffin is a 67 y.o. female who presents to clinic for an annual.    Stopped restoril, tagamen, and fosmax.      Had a DEXA scan 12/9/2020 revealed osteoporosis with a 11.6% risk of a major osteoporotic fracture and a 2.0% risk of hip fracture in the next 10 years (FRAX).  Started on fosamax weekly 1/2020 but reports that she stopped this medication 2 months ago as she started having generalized joint pain and read this was a side effect.   She works out at the gym and is taking calcium and vitamin D3 supplementation.    Reports chronic right wrist pain on the medial aspect of the wrist. Getting worse with use. Tried brace but too hot to wear all day.   Xray 7/2023 was normal.    [facility] Polina has a past medical history of Abnormal Pap smear of cervix (2011, 2013), Anemia, Esophageal reflux, Fibroids, Fibromatosis of plantar fascia, Insomnia, Irritable bowel syndrome (IBS), Ocular migraine, Tinnitus, and Urge incontinence.   PSXH Polina has a past surgical history that includes [place] tooth extraction (Bilateral, 1973); Tubal ligation (Bilateral, 1980); Endometrial biopsy (2010); Nasal turbinate reduction (2015); Appendectomy (2008); Cervical biopsy w/ loop electrode excision (2011); Colonoscopy (N/A, 12/11/2017); Superficial keratectomy (Right, 07/2021); Upper gastrointestinal endoscopy; Cataract extraction (Bilateral, 2021); and Colonoscopy (N/A, 12/7/2022).    Polina's family history includes COPD in her mother; Cancer in her father; Coronary artery disease in her father and mother; Diabetes in her brother; Heart attack in her father; Hyperlipidemia in her father and mother; Hypertension in her father and mother; Prostate cancer in her father; Stroke in her father.    Polina reports that she has never smoked. She has never used smokeless tobacco. She reports that she does not drink alcohol and does not use drugs.   ALG Polina is allergic to amoxicillin.   MED Polina  "has a current medication list which includes the following prescription(s): alpha-d-galactosidase, calcium phosphate trib/vit d3, carboxymethylcellulose, coenzyme q10, fluticasone propionate, lactobacillus acidophilus, melatonin, multivitamin, and vit c,q-nz-ddjzn-lutein-zeaxan.     Review of Systems   Constitutional:  Negative for activity change, appetite change, chills, fatigue and fever.   Eyes:  Negative for visual disturbance.   Respiratory:  Negative for cough and shortness of breath.    Cardiovascular:  Negative for chest pain, palpitations and leg swelling.   Gastrointestinal:  Negative for abdominal pain, nausea and vomiting.   Musculoskeletal:  Positive for arthralgias.        Right wrist pain   Skin:  Negative for wound.   Neurological:  Negative for dizziness and headaches.   Psychiatric/Behavioral:  Negative for confusion.      Objective     /68 (BP Location: Right arm, Patient Position: Sitting, BP Method: Medium (Manual))   Pulse 77   Ht 5' 6" (1.676 m)   Wt 54.5 kg (120 lb 3.2 oz)   LMP  (LMP Unknown)   SpO2 96%   BMI 19.40 kg/m²     Physical Exam   Constitutional: normal appearance. She appears well-developed and well-nourished.  Non-toxic appearance. No distress. She does not appear ill.   HENT:   Head: Normocephalic and atraumatic.   Eyes: Right eye exhibits no discharge. Left eye exhibits no discharge.   Cardiovascular: Normal rate, regular rhythm, normal heart sounds and normal pulses. Exam reveals no gallop and no friction rub.   No murmur heard.Pulmonary:      Effort: Pulmonary effort is normal. No respiratory distress.      Breath sounds: Normal breath sounds. No wheezing, rhonchi or rales.     Abdominal: Normal appearance.   Musculoskeletal:      Cervical back: Neck supple.   Lymphadenopathy:     She has no cervical adenopathy.   Neurological: She is alert.   Skin: Skin is warm and dry. Capillary refill takes less than 2 seconds. She is not diaphoretic.   Psychiatric: Her " behavior is normal. Mood, judgment and thought content normal.   Vitals reviewed.     Assessment/Plan     Polina was seen today for follow-up and wrist pain.    Diagnoses and all orders for this visit:    Annual physical exam    Chronic pain of right wrist  -     Ambulatory referral/consult to Orthopedics; Future    Age-related osteoporosis without current pathological fracture    Encounter for lipid screening for cardiovascular disease  -     Lipid panel; Future    Encounter for medication monitoring  -     CBC auto differential; Future  -     Comprehensive metabolic panel; Future    Screening mammogram for breast cancer  -     Mammo Digital Screening Bilat w/ [place]; Future          Follow up in about 1 year (around 8/29/2024), or if symptoms worsen or fail to improve.      Noman Eddy MD  Family Medicine  [facility]

## 2023-08-30 ENCOUNTER — LAB VISIT (OUTPATIENT)
Dept: LAB | Facility: HOSPITAL | Age: 68
End: 2023-08-30
Attending: FAMILY MEDICINE
Payer: MEDICARE

## 2023-08-30 VITALS
HEART RATE: 77 BPM | SYSTOLIC BLOOD PRESSURE: 120 MMHG | WEIGHT: 150 LBS | BODY MASS INDEX: 24.11 KG/M2 | OXYGEN SATURATION: 96 % | DIASTOLIC BLOOD PRESSURE: 68 MMHG | HEIGHT: 66 IN

## 2023-08-30 DIAGNOSIS — Z51.81 ENCOUNTER FOR MEDICATION MONITORING: ICD-10-CM

## 2023-08-30 DIAGNOSIS — Z13.220 ENCOUNTER FOR LIPID SCREENING FOR CARDIOVASCULAR DISEASE: ICD-10-CM

## 2023-08-30 DIAGNOSIS — Z13.6 ENCOUNTER FOR LIPID SCREENING FOR CARDIOVASCULAR DISEASE: ICD-10-CM

## 2023-08-30 LAB
ALBUMIN SERPL BCP-MCNC: 3.8 G/DL (ref 3.5–5.2)
ALP SERPL-CCNC: 60 U/L (ref 55–135)
ALT SERPL W/O P-5'-P-CCNC: 17 U/L (ref 10–44)
ANION GAP SERPL CALC-SCNC: 9 MMOL/L (ref 8–16)
AST SERPL-CCNC: 18 U/L (ref 10–40)
BASOPHILS # BLD AUTO: 0.02 K/UL (ref 0–0.2)
BASOPHILS NFR BLD: 0.4 % (ref 0–1.9)
BILIRUB SERPL-MCNC: 1 MG/DL (ref 0.1–1)
BUN SERPL-MCNC: 10 MG/DL (ref 8–23)
CALCIUM SERPL-MCNC: 9.3 MG/DL (ref 8.7–10.5)
CHLORIDE SERPL-SCNC: 107 MMOL/L (ref 95–110)
CHOLEST SERPL-MCNC: 185 MG/DL (ref 120–199)
CHOLEST/HDLC SERPL: 3.9 {RATIO} (ref 2–5)
CO2 SERPL-SCNC: 29 MMOL/L (ref 23–29)
CREAT SERPL-MCNC: 0.8 MG/DL (ref 0.5–1.4)
DIFFERENTIAL METHOD: NORMAL
EOSINOPHIL # BLD AUTO: 0.1 K/UL (ref 0–0.5)
EOSINOPHIL NFR BLD: 1.6 % (ref 0–8)
ERYTHROCYTE [DISTWIDTH] IN BLOOD BY AUTOMATED COUNT: 12.8 % (ref 11.5–14.5)
EST. GFR  (NO RACE VARIABLE): >60 ML/MIN/1.73 M^2
GLUCOSE SERPL-MCNC: 92 MG/DL (ref 70–110)
HCT VFR BLD AUTO: 39.8 % (ref 37–48.5)
HDLC SERPL-MCNC: 47 MG/DL (ref 40–75)
HDLC SERPL: 25.4 % (ref 20–50)
HGB BLD-MCNC: 12.9 G/DL (ref 12–16)
IMM GRANULOCYTES # BLD AUTO: 0.01 K/UL (ref 0–0.04)
IMM GRANULOCYTES NFR BLD AUTO: 0.2 % (ref 0–0.5)
LDLC SERPL CALC-MCNC: 112 MG/DL (ref 63–159)
LYMPHOCYTES # BLD AUTO: 1.9 K/UL (ref 1–4.8)
LYMPHOCYTES NFR BLD: 37.1 % (ref 18–48)
MCH RBC QN AUTO: 29.2 PG (ref 27–31)
MCHC RBC AUTO-ENTMCNC: 32.4 G/DL (ref 32–36)
MCV RBC AUTO: 90 FL (ref 82–98)
MONOCYTES # BLD AUTO: 0.3 K/UL (ref 0.3–1)
MONOCYTES NFR BLD: 6.6 % (ref 4–15)
NEUTROPHILS # BLD AUTO: 2.7 K/UL (ref 1.8–7.7)
NEUTROPHILS NFR BLD: 54.1 % (ref 38–73)
NONHDLC SERPL-MCNC: 138 MG/DL
NRBC BLD-RTO: 0 /100 WBC
PLATELET # BLD AUTO: 179 K/UL (ref 150–450)
PMV BLD AUTO: 9.9 FL (ref 9.2–12.9)
POTASSIUM SERPL-SCNC: 3.9 MMOL/L (ref 3.5–5.1)
PROT SERPL-MCNC: 6.8 G/DL (ref 6–8.4)
RBC # BLD AUTO: 4.42 M/UL (ref 4–5.4)
SODIUM SERPL-SCNC: 145 MMOL/L (ref 136–145)
TRIGL SERPL-MCNC: 130 MG/DL (ref 30–150)
WBC # BLD AUTO: 5.01 K/UL (ref 3.9–12.7)

## 2023-08-30 PROCEDURE — 36415 COLL VENOUS BLD VENIPUNCTURE: CPT | Performed by: FAMILY MEDICINE

## 2023-08-30 PROCEDURE — 80061 LIPID PANEL: CPT | Performed by: FAMILY MEDICINE

## 2023-08-30 PROCEDURE — 80053 COMPREHEN METABOLIC PANEL: CPT | Performed by: FAMILY MEDICINE

## 2023-08-30 PROCEDURE — 85025 COMPLETE CBC W/AUTO DIFF WBC: CPT | Performed by: FAMILY MEDICINE

## 2023-09-11 ENCOUNTER — OFFICE VISIT (OUTPATIENT)
Dept: ORTHOPEDICS | Facility: CLINIC | Age: 68
End: 2023-09-11
Payer: MEDICARE

## 2023-09-11 VITALS — HEIGHT: 66 IN | BODY MASS INDEX: 24.1 KG/M2 | RESPIRATION RATE: 16 BRPM | WEIGHT: 149.94 LBS

## 2023-09-11 DIAGNOSIS — M25.531 CHRONIC PAIN OF RIGHT WRIST: ICD-10-CM

## 2023-09-11 DIAGNOSIS — G89.29 CHRONIC PAIN OF RIGHT WRIST: ICD-10-CM

## 2023-09-11 PROCEDURE — 3008F BODY MASS INDEX DOCD: CPT | Mod: CPTII,S$GLB,, | Performed by: ORTHOPAEDIC SURGERY

## 2023-09-11 PROCEDURE — 1101F PT FALLS ASSESS-DOCD LE1/YR: CPT | Mod: CPTII,S$GLB,, | Performed by: ORTHOPAEDIC SURGERY

## 2023-09-11 PROCEDURE — 1160F PR REVIEW ALL MEDS BY PRESCRIBER/CLIN PHARMACIST DOCUMENTED: ICD-10-PCS | Mod: CPTII,S$GLB,, | Performed by: ORTHOPAEDIC SURGERY

## 2023-09-11 PROCEDURE — 1101F PR PT FALLS ASSESS DOC 0-1 FALLS W/OUT INJ PAST YR: ICD-10-PCS | Mod: CPTII,S$GLB,, | Performed by: ORTHOPAEDIC SURGERY

## 2023-09-11 PROCEDURE — 99204 OFFICE O/P NEW MOD 45 MIN: CPT | Mod: 25,S$GLB,, | Performed by: ORTHOPAEDIC SURGERY

## 2023-09-11 PROCEDURE — 99999 PR PBB SHADOW E&M-EST. PATIENT-LVL III: ICD-10-PCS | Mod: PBBFAC,,, | Performed by: ORTHOPAEDIC SURGERY

## 2023-09-11 PROCEDURE — 3288F FALL RISK ASSESSMENT DOCD: CPT | Mod: CPTII,S$GLB,, | Performed by: ORTHOPAEDIC SURGERY

## 2023-09-11 PROCEDURE — 20550 NJX 1 TENDON SHEATH/LIGAMENT: CPT | Mod: RT,S$GLB,, | Performed by: ORTHOPAEDIC SURGERY

## 2023-09-11 PROCEDURE — 3008F PR BODY MASS INDEX (BMI) DOCUMENTED: ICD-10-PCS | Mod: CPTII,S$GLB,, | Performed by: ORTHOPAEDIC SURGERY

## 2023-09-11 PROCEDURE — 1159F MED LIST DOCD IN RCRD: CPT | Mod: CPTII,S$GLB,, | Performed by: ORTHOPAEDIC SURGERY

## 2023-09-11 PROCEDURE — 3288F PR FALLS RISK ASSESSMENT DOCUMENTED: ICD-10-PCS | Mod: CPTII,S$GLB,, | Performed by: ORTHOPAEDIC SURGERY

## 2023-09-11 PROCEDURE — 20550 PR INJECT TENDON SHEATH/LIGAMENT: ICD-10-PCS | Mod: RT,S$GLB,, | Performed by: ORTHOPAEDIC SURGERY

## 2023-09-11 PROCEDURE — 1160F RVW MEDS BY RX/DR IN RCRD: CPT | Mod: CPTII,S$GLB,, | Performed by: ORTHOPAEDIC SURGERY

## 2023-09-11 PROCEDURE — 1159F PR MEDICATION LIST DOCUMENTED IN MEDICAL RECORD: ICD-10-PCS | Mod: CPTII,S$GLB,, | Performed by: ORTHOPAEDIC SURGERY

## 2023-09-11 PROCEDURE — 99999 PR PBB SHADOW E&M-EST. PATIENT-LVL III: CPT | Mod: PBBFAC,,, | Performed by: ORTHOPAEDIC SURGERY

## 2023-09-11 PROCEDURE — 99204 PR OFFICE/OUTPT VISIT, NEW, LEVL IV, 45-59 MIN: ICD-10-PCS | Mod: 25,S$GLB,, | Performed by: ORTHOPAEDIC SURGERY

## 2023-09-11 RX ORDER — TRIAMCINOLONE ACETONIDE 40 MG/ML
40 INJECTION, SUSPENSION INTRA-ARTICULAR; INTRAMUSCULAR
Status: DISCONTINUED | OUTPATIENT
Start: 2023-09-11 | End: 2023-09-11 | Stop reason: HOSPADM

## 2023-09-11 RX ORDER — MELOXICAM 15 MG/1
15 TABLET ORAL DAILY
Qty: 30 TABLET | Refills: 1 | Status: SHIPPED | OUTPATIENT
Start: 2023-09-11 | End: 2024-02-06

## 2023-09-11 RX ADMIN — TRIAMCINOLONE ACETONIDE 40 MG: 40 INJECTION, SUSPENSION INTRA-ARTICULAR; INTRAMUSCULAR at 02:09

## 2023-09-11 NOTE — PROGRESS NOTES
Subjective:      Patient ID: Polina Griffin is a 67 y.o. female.    Chief Complaint: Pain of the Right Wrist    HPI  67-year-old right-hand dominant female [place] status post a FOOSH type injury onto her right hand.  She is noticed a somewhat progressive right wrist discomfort.  It is only positional and when she does repetitive gripping and grasping.  She is had no specific treatment for this.  Denies any radiating pain numbness or tingling.  ROS      Objective:    Ortho Exam     Constitutional:   Patient is alert  and oriented in no acute distress  HEENT:  normocephalic atraumatic; PERRL EOMI  Neck:  Supple without adenopathy  Cardiovascular:  Normal rate and rhythm  Pulmonary:  Normal respiratory effort normal chest wall expansion  Abdominal:  Nonprotuberant nondistended  Musculoskeletal:  Patient has mild swelling diffuse tenderness of the radial styloid  A mildly positive Finkelstein's.  No increased warmth or erythema no gross wrist instability  She has adequate motor strength and negative carpal tunnel compression test.  Neurological:  No focal defect; cranial nerves 2-12 grossly intact  Psychiatric/behavioral:  Mood and behavior normal      X-Ray Wrist Complete Right  Narrative: EXAMINATION:  XR WRIST COMPLETE 3 VIEWS RIGHT    CLINICAL HISTORY:  Pain in right wrist    TECHNIQUE:  PA, lateral, and oblique views of the right wrist were performed.    FINDINGS:  There is no acute fracture or dislocation.  No significant soft tissue swelling.    The carpal bones are normal in appearance.  The radiocarpal articulation is intact.  The ulnar styloid is intact.    There is bone demineralization.  Impression: No acute radiographic findings of the wrist.    Electronically signed by: Arcenio Brush  Date:    07/19/2023  Time:    10:58       My Radiographs Findings:    I have personally reviewed radiographs and concur with above findings    Assessment:       Encounter Diagnosis   Name Primary?    Chronic pain of right  wrist          Plan:       I have discussed medical condition and treatment options with her at length.  After a verbal consent and sterile prep I injected her with a radial styloid in the 1st dorsal compartment without complication with a combination of cc of Kenalog several cc of lidocaine.  We have provided a thumb spica splint for her some rehab exercises and I will give her a trial of Mobic GI cardiac and renal precautions were discussed.  Follow up in 4-6 weeks sooner if any questions or problems.  We have also discussed generalized activity restrictions avoiding frequent gripping grasping and repetitive use of the wrist.        Past Medical History:   Diagnosis Date    Abnormal Pap smear of cervix 2011, 2013    LEEP    Anemia     in her 20's    Esophageal reflux     Fibroids     Fibromatosis of plantar fascia     Insomnia     Irritable bowel syndrome (IBS)     Ocular migraine     Tinnitus     Urge incontinence      Past Surgical History:   Procedure Laterality Date    APPENDECTOMY  2008    CATARACT EXTRACTION Bilateral 2021    CERVICAL BIOPSY  W/ LOOP ELECTRODE EXCISION  2011    COLONOSCOPY N/A 12/11/2017    Procedure: COLONOSCOPY;  Surgeon: Angel Florez MD;  Location: [facility] ENDO ([address] [place] [address]);  Service: Endoscopy;  Laterality: N/A;    COLONOSCOPY N/A 12/7/2022    Procedure: COLONOSCOPY;  Surgeon: Elle Armenta MD;  Location: [facility];  Service: Endoscopy;  Laterality: N/A;    ENDOMETRIAL BIOPSY  2010    NASAL TURBINATE REDUCTION  2015    SUPERFICIAL KERATECTOMY Right 07/2021    TUBAL LIGATION Bilateral 1980    UPPER GASTROINTESTINAL ENDOSCOPY      WISDOM TOOTH EXTRACTION Bilateral 1973         Current Outpatient Medications:     alpha-D-galactosidase (BEANO ORAL), Take by mouth daily as needed., Disp: , Rfl:     CALCIUM PHOSPHATE TRIB/VIT D3 (CITRACAL + D3, CALCIUM PHOS, ORAL), Take by mouth., Disp: , Rfl:     carboxymethylcellulose (REFRESH PLUS) 0.5 % Dpet, 1 drop 3 (three) times daily as needed.,  Disp: , Rfl:     coenzyme Q10 100 mg capsule, Take 100 mg by mouth once daily., Disp: , Rfl:     fluticasone (FLONASE) 50 mcg/actuation nasal spray, 1 spray by Each Nare route once daily., Disp: , Rfl:     LACTOBACILLUS ACIDOPHILUS (PROBIOTIC ORAL), Take by mouth., Disp: , Rfl:     MELATONIN (MELATIN ORAL), Take 10 mg by mouth. , Disp: , Rfl:     multivitamin capsule, Take 1 capsule by mouth once daily., Disp: , Rfl:     vit C,E-Ew-bljtu-lutein-zeaxan (PRESERVISION AREDS 2) 250-90-40-1 mg Cap, Take 1 tablet by mouth once daily., Disp: , Rfl:     Review of patient's allergies indicates:   Allergen Reactions    Amoxicillin Rash       Family History   Problem Relation Age of Onset    COPD Mother     Hyperlipidemia Mother     Hypertension Mother     Coronary artery disease Mother     Stroke Father     Hypertension Father     Coronary artery disease Father     Heart attack Father     Hyperlipidemia Father     Cancer Father     Prostate cancer Father     Diabetes Brother     Ovarian cancer Neg Hx     Colon cancer Neg Hx     Breast cancer Neg Hx     Crohn's disease Neg Hx     Ulcerative colitis Neg Hx     Stomach cancer Neg Hx     Esophageal cancer Neg Hx     Irritable bowel syndrome Neg Hx     Celiac disease Neg Hx     Rectal cancer Neg Hx     Colon polyps Neg Hx      Social History     Occupational History    Not on file   Tobacco Use    Smoking status: Never    Smokeless tobacco: Never   Substance and Sexual Activity    Alcohol use: No    Drug use: No    Sexual activity: Not Currently     Partners: Male     Birth control/protection: None, Post-menopausal     Comment: :

## 2023-09-11 NOTE — PROCEDURES
Tendon Sheath    Date/Time: 9/11/2023 2:45 PM    Performed by: Remy Roldan MD  Authorized by: Remy Roldan MD    Consent Done?:  Yes (Verbal)  Indications:  Pain  Site marked: the procedure site was marked    Timeout: prior to procedure the correct patient, procedure, and site was verified    Prep: patient was prepped and draped in usual sterile fashion      Local anesthesia used?: Yes    Anesthesia:  Local infiltration  Local anesthetic:  Lidocaine 1% without epinephrine and bupivacaine 0.25% without epinephrine  Anesthetic total (ml):  2    Needle size:  22 G  Approach:  Dorsal  Medications:  40 mg triamcinolone acetonide 40 mg/mL  Patient tolerance:  Patient tolerated the procedure well with no immediate complications

## 2023-10-10 ENCOUNTER — HOSPITAL ENCOUNTER (OUTPATIENT)
Dept: RADIOLOGY | Facility: HOSPITAL | Age: 68
Discharge: HOME | End: 2023-10-10
Attending: FAMILY MEDICINE
Payer: MEDICARE

## 2023-10-10 DIAGNOSIS — Z12.31 SCREENING MAMMOGRAM FOR BREAST CANCER: ICD-10-CM

## 2023-10-10 PROCEDURE — 77067 MAMMO DIGITAL SCREENING BILAT WITH TOMO: ICD-10-PCS | Mod: 26,,, | Performed by: RADIOLOGY

## 2023-10-10 PROCEDURE — 77063 MAMMO DIGITAL SCREENING BILAT WITH TOMO: ICD-10-PCS | Mod: 26,,, | Performed by: RADIOLOGY

## 2023-10-10 PROCEDURE — 77063 BREAST TOMOSYNTHESIS BI: CPT | Mod: 26,,, | Performed by: RADIOLOGY

## 2023-10-10 PROCEDURE — 77067 SCR MAMMO BI INCL CAD: CPT | Mod: TC

## 2023-10-10 PROCEDURE — 77067 SCR MAMMO BI INCL CAD: CPT | Mod: 26,,, | Performed by: RADIOLOGY

## 2023-12-11 DIAGNOSIS — S99.919A ANKLE INJURY, UNSPECIFIED LATERALITY, INITIAL ENCOUNTER: Primary | ICD-10-CM

## 2023-12-14 ENCOUNTER — TELEPHONE (OUTPATIENT)
Dept: FAMILY MEDICINE | Facility: CLINIC | Age: 68
End: 2023-12-14
Payer: MEDICARE

## 2024-02-06 ENCOUNTER — TELEPHONE (OUTPATIENT)
Dept: FAMILY MEDICINE | Facility: CLINIC | Age: 69
End: 2024-02-06
Payer: MEDICARE

## 2024-02-06 ENCOUNTER — PATIENT MESSAGE (OUTPATIENT)
Dept: FAMILY MEDICINE | Facility: CLINIC | Age: 69
End: 2024-02-06
Payer: MEDICARE

## 2024-02-06 ENCOUNTER — OFFICE VISIT (OUTPATIENT)
Dept: FAMILY MEDICINE | Facility: CLINIC | Age: 69
End: 2024-02-06
Payer: MEDICARE

## 2024-02-06 VITALS
WEIGHT: 151.38 LBS | SYSTOLIC BLOOD PRESSURE: 142 MMHG | HEART RATE: 82 BPM | OXYGEN SATURATION: 98 % | BODY MASS INDEX: 24.33 KG/M2 | DIASTOLIC BLOOD PRESSURE: 70 MMHG | HEIGHT: 66 IN

## 2024-02-06 DIAGNOSIS — Z13.220 ENCOUNTER FOR LIPID SCREENING FOR CARDIOVASCULAR DISEASE: ICD-10-CM

## 2024-02-06 DIAGNOSIS — M17.0 PRIMARY OSTEOARTHRITIS OF BOTH KNEES: ICD-10-CM

## 2024-02-06 DIAGNOSIS — M81.0 AGE-RELATED OSTEOPOROSIS WITHOUT CURRENT PATHOLOGICAL FRACTURE: ICD-10-CM

## 2024-02-06 DIAGNOSIS — Z12.31 SCREENING MAMMOGRAM FOR BREAST CANCER: ICD-10-CM

## 2024-02-06 DIAGNOSIS — Z01.411 ABNORMAL FEMALE PELVIC EXAM: ICD-10-CM

## 2024-02-06 DIAGNOSIS — Z00.00 ANNUAL PHYSICAL EXAM: ICD-10-CM

## 2024-02-06 DIAGNOSIS — Z13.6 ENCOUNTER FOR LIPID SCREENING FOR CARDIOVASCULAR DISEASE: ICD-10-CM

## 2024-02-06 DIAGNOSIS — L70.8 ACNE-LIKE SKIN BUMPS: Primary | ICD-10-CM

## 2024-02-06 PROCEDURE — 1159F MED LIST DOCD IN RCRD: CPT | Mod: CPTII,S$GLB,, | Performed by: FAMILY MEDICINE

## 2024-02-06 PROCEDURE — 3288F FALL RISK ASSESSMENT DOCD: CPT | Mod: CPTII,S$GLB,, | Performed by: FAMILY MEDICINE

## 2024-02-06 PROCEDURE — 3008F BODY MASS INDEX DOCD: CPT | Mod: CPTII,S$GLB,, | Performed by: FAMILY MEDICINE

## 2024-02-06 PROCEDURE — 99214 OFFICE O/P EST MOD 30 MIN: CPT | Mod: S$GLB,,, | Performed by: FAMILY MEDICINE

## 2024-02-06 PROCEDURE — 3078F DIAST BP <80 MM HG: CPT | Mod: CPTII,S$GLB,, | Performed by: FAMILY MEDICINE

## 2024-02-06 PROCEDURE — 3077F SYST BP >= 140 MM HG: CPT | Mod: CPTII,S$GLB,, | Performed by: FAMILY MEDICINE

## 2024-02-06 PROCEDURE — 1126F AMNT PAIN NOTED NONE PRSNT: CPT | Mod: CPTII,S$GLB,, | Performed by: FAMILY MEDICINE

## 2024-02-06 PROCEDURE — 99999 PR PBB SHADOW E&M-EST. PATIENT-LVL IV: CPT | Mod: PBBFAC,,, | Performed by: FAMILY MEDICINE

## 2024-02-06 PROCEDURE — 1101F PT FALLS ASSESS-DOCD LE1/YR: CPT | Mod: CPTII,S$GLB,, | Performed by: FAMILY MEDICINE

## 2024-02-06 RX ORDER — DICLOFENAC SODIUM 10 MG/G
2 GEL TOPICAL DAILY
Qty: 450 G | Refills: 5 | Status: SHIPPED | OUTPATIENT
Start: 2024-02-06

## 2024-02-06 RX ORDER — MELOXICAM 7.5 MG/1
7.5 TABLET ORAL DAILY
Qty: 90 TABLET | Refills: 1 | Status: SHIPPED | OUTPATIENT
Start: 2024-02-06

## 2024-02-06 RX ORDER — IBUPROFEN 800 MG/1
800 TABLET ORAL EVERY 6 HOURS PRN
COMMUNITY
Start: 2023-12-26 | End: 2024-03-11

## 2024-02-06 RX ORDER — METRONIDAZOLE 7.5 MG/G
GEL TOPICAL 2 TIMES DAILY
Qty: 45 G | Refills: 1 | Status: SHIPPED | OUTPATIENT
Start: 2024-02-06 | End: 2025-02-05

## 2024-02-06 NOTE — PROGRESS NOTES
Subjective:       Patient ID: Polina Griffin is a 68 y.o. female.    Chief Complaint: Follow-up    Ms. Griffin is a 60-year-old female who is here today for routine follow up in to [facility] care.  She has ocular migraines, hypertension, IBS, urge incontinence, and vaginal dryness.  She is requesting that a DEXA scan be ordered and that she be referred to gyn for a Pap.  She had a LEEP procedure approximally 10 years ago for precancerous cells of the cervix.  She has not followed up since then.  She is also requesting a Dermatology referral what looks like possible rosacea on her cheeks.    She also complains of chronic osteoarthritis in her bilateral knees , shoulders and hands.  She does have an orthopedic, Dr. Roldan who has done a joint injection on her in the past.  She is requesting some medication to help with her osteoarthritis.  We discussed oral and topical she would like to try both    Follow-up  Associated symptoms include arthralgias, joint swelling and myalgias.     Review of Systems   Genitourinary:         History of Leep, 10 years ago. Needs follow up GYN   Musculoskeletal:  Positive for arthralgias, joint swelling and myalgias.   Skin:         Complaints of Red bumps on face (rosacea?)       Patient Active Problem List   Diagnosis    Ocular migraine    Hypertension    Irritable bowel syndrome (IBS)    Fibromatosis of plantar fascia    Urge incontinence    Intramural leiomyoma of uterus    Angiokeratoma of vulva    Postmenopausal bleeding    Non-cardiac chest pain       Objective:      Physical Exam  Vitals and nursing note reviewed.   Constitutional:       Appearance: Normal appearance. She is normal weight.   HENT:      Head: Normocephalic.      Nose: Nose normal.   Eyes:      Extraocular Movements: Extraocular movements intact.      Conjunctiva/sclera: Conjunctivae normal.      Pupils: Pupils are equal, round, and reactive to light.   Cardiovascular:      Rate and Rhythm: Normal rate and regular  "rhythm.      Heart sounds: Normal heart sounds. No murmur heard.  Pulmonary:      Effort: Pulmonary effort is normal. No respiratory distress.      Breath sounds: Normal breath sounds.   Musculoskeletal:         General: Normal range of motion.      Cervical back: Normal range of motion and neck supple.   Skin:     General: Skin is warm and dry.   Neurological:      General: No focal deficit present.      Mental Status: She is alert and oriented to person, place, and time.   Psychiatric:         Mood and Affect: Mood normal.         Behavior: Behavior normal.         Lab Results   Component Value Date    WBC 5.01 08/30/2023    HGB 12.9 08/30/2023    HCT 39.8 08/30/2023     08/30/2023    CHOL 185 08/30/2023    TRIG 130 08/30/2023    HDL 47 08/30/2023    ALT 17 08/30/2023    AST 18 08/30/2023     08/30/2023    K 3.9 08/30/2023     08/30/2023    CREATININE 0.8 08/30/2023    BUN 10 08/30/2023    CO2 29 08/30/2023    TSH 2.462 10/20/2021     The 10-year ASCVD risk score (Sara BURDEN, et al., 2019) is: 9.4%    Values used to calculate the score:      Age: 68 years      Sex: Female      Is Non- : No      Diabetic: No      Tobacco smoker: No      Systolic Blood Pressure: 142 mmHg      Is BP treated: No      HDL Cholesterol: 47 mg/dL      Total Cholesterol: 185 mg/dL  Visit Vitals  BP (!) 142/70 (BP Location: Left arm, Patient Position: Sitting, BP Method: Medium (Manual))   Pulse 82   Ht 5' 6" (1.676 m)   Wt 68.7 kg (151 lb 6.4 oz)   LMP  (LMP Unknown)   SpO2 98%   BMI 24.44 kg/m²      Assessment:       1. Acne-like skin bumps    2. Annual physical exam    3. Age-related osteoporosis without current pathological fracture    4. Encounter for lipid screening for cardiovascular disease    5. Screening mammogram for breast cancer    6. Abnormal female pelvic exam    7. Primary osteoarthritis of both knees        Plan:       1. Acne-like skin bumps  -     Ambulatory referral/consult to " Dermatology; Future; Expected date: 02/13/2024  -     metroNIDAZOLE (METROGEL) 0.75 % gel; Apply topically 2 (two) times daily.  Dispense: 45 g; Refill: 1    2. Annual physical exam    3. Age-related osteoporosis without current pathological fracture  -     DXA Bone Density Axial Skeleton 1 or more sites; Future; Expected date: 02/06/2024    4. Encounter for lipid screening for cardiovascular disease lab due next visit    5. Screening mammogram for breast cancer will schedule when due    6. Abnormal female pelvic exam  -     DXA Bone Density Axial Skeleton 1 or more sites; Future; Expected date: 02/06/2024  -     Ambulatory referral/consult to Obstetrics / Gynecology; Future; Expected date: 02/13/2024    7. Primary osteoarthritis of both knees  -     diclofenac sodium (VOLTAREN) 1 % Gel; Apply 2 g topically once daily.  Dispense: 450 g; Refill: 5  -     meloxicam (MOBIC) 7.5 MG tablet; Take 1 tablet (7.5 mg total) by mouth once daily.  Dispense: 90 tablet; Refill: 1       Follow up in about 6 months (around 8/6/2024), or if symptoms worsen or fail to improve.      Future Appointments       Date Provider Specialty Appt Notes    2/7/2024  Radiology Age-related osteoporosis without current pathological fracture [M81.0]  Abnormal female pelvic exam [Z01.411]    8/6/2024 Marisol Law MD Family Medicine 6 mo f/u

## 2024-02-07 ENCOUNTER — HOSPITAL ENCOUNTER (OUTPATIENT)
Dept: RADIOLOGY | Facility: HOSPITAL | Age: 69
Discharge: HOME | End: 2024-02-07
Attending: FAMILY MEDICINE
Payer: MEDICARE

## 2024-02-07 DIAGNOSIS — M85.80 OSTEOPENIA, UNSPECIFIED LOCATION: Primary | ICD-10-CM

## 2024-02-07 DIAGNOSIS — Z01.411 ABNORMAL FEMALE PELVIC EXAM: ICD-10-CM

## 2024-02-07 DIAGNOSIS — M81.0 AGE-RELATED OSTEOPOROSIS WITHOUT CURRENT PATHOLOGICAL FRACTURE: ICD-10-CM

## 2024-02-07 PROCEDURE — 77080 DXA BONE DENSITY AXIAL: CPT | Mod: 26,,, | Performed by: RADIOLOGY

## 2024-02-07 PROCEDURE — 77080 DXA BONE DENSITY AXIAL: CPT | Mod: TC

## 2024-02-07 RX ORDER — ALENDRONATE SODIUM 70 MG/1
70 TABLET ORAL
Qty: 12 TABLET | Refills: 3 | Status: SHIPPED | OUTPATIENT
Start: 2024-02-07 | End: 2025-02-06

## 2024-03-26 ENCOUNTER — OFFICE VISIT (OUTPATIENT)
Dept: GASTROENTEROLOGY | Facility: CLINIC | Age: 69
End: 2024-03-26
Payer: MEDICARE

## 2024-03-26 VITALS
WEIGHT: 152.56 LBS | HEIGHT: 66 IN | BODY MASS INDEX: 24.52 KG/M2 | SYSTOLIC BLOOD PRESSURE: 165 MMHG | HEART RATE: 86 BPM | DIASTOLIC BLOOD PRESSURE: 77 MMHG

## 2024-03-26 DIAGNOSIS — K21.9 GASTROESOPHAGEAL REFLUX DISEASE, UNSPECIFIED WHETHER ESOPHAGITIS PRESENT: ICD-10-CM

## 2024-03-26 DIAGNOSIS — K62.5 RECTAL BLEEDING: Primary | ICD-10-CM

## 2024-03-26 DIAGNOSIS — R14.0 ABDOMINAL BLOATING: ICD-10-CM

## 2024-03-26 DIAGNOSIS — Z12.11 SCREENING FOR COLON CANCER: ICD-10-CM

## 2024-03-26 DIAGNOSIS — Z87.19 HISTORY OF IBS: ICD-10-CM

## 2024-03-26 DIAGNOSIS — K64.9 HEMORRHOIDS, UNSPECIFIED HEMORRHOID TYPE: ICD-10-CM

## 2024-03-26 PROCEDURE — 1101F PT FALLS ASSESS-DOCD LE1/YR: CPT | Mod: CPTII,S$GLB,,

## 2024-03-26 PROCEDURE — 1126F AMNT PAIN NOTED NONE PRSNT: CPT | Mod: CPTII,S$GLB,,

## 2024-03-26 PROCEDURE — 3288F FALL RISK ASSESSMENT DOCD: CPT | Mod: CPTII,S$GLB,,

## 2024-03-26 PROCEDURE — 3077F SYST BP >= 140 MM HG: CPT | Mod: CPTII,S$GLB,,

## 2024-03-26 PROCEDURE — 1160F RVW MEDS BY RX/DR IN RCRD: CPT | Mod: CPTII,S$GLB,,

## 2024-03-26 PROCEDURE — 3078F DIAST BP <80 MM HG: CPT | Mod: CPTII,S$GLB,,

## 2024-03-26 PROCEDURE — 99999 PR PBB SHADOW E&M-EST. PATIENT-LVL V: CPT | Mod: PBBFAC,,,

## 2024-03-26 PROCEDURE — 3008F BODY MASS INDEX DOCD: CPT | Mod: CPTII,S$GLB,,

## 2024-03-26 PROCEDURE — 99214 OFFICE O/P EST MOD 30 MIN: CPT | Mod: S$GLB,,,

## 2024-03-26 PROCEDURE — 1159F MED LIST DOCD IN RCRD: CPT | Mod: CPTII,S$GLB,,

## 2024-03-26 RX ORDER — DICYCLOMINE HYDROCHLORIDE 10 MG/1
10 CAPSULE ORAL 4 TIMES DAILY PRN
Qty: 120 CAPSULE | Refills: 0 | Status: SHIPPED | OUTPATIENT
Start: 2024-03-26 | End: 2024-04-25

## 2024-03-26 RX ORDER — FAMOTIDINE 40 MG/1
40 TABLET, FILM COATED ORAL NIGHTLY PRN
Qty: 90 TABLET | Refills: 0 | Status: SHIPPED | OUTPATIENT
Start: 2024-03-26 | End: 2024-06-24

## 2024-03-26 RX ORDER — IBUPROFEN 200 MG
200 TABLET ORAL EVERY 6 HOURS PRN
COMMUNITY

## 2024-03-26 NOTE — PROGRESS NOTES
"Subjective:       Patient ID: Polina Griffin is a 68 y.o. female Body mass index is 24.62 kg/m².    Chief Complaint: Rectal Bleeding (Follow up)    Established patient of Dr. Armenta and myself.  Former patient of EDIE Kruger NP     GI Problem  The primary symptoms include hematochezia (CHIEF COMPLAINT). Primary symptoms do not include fever, weight loss, fatigue, abdominal pain, nausea, vomiting, diarrhea, melena, hematemesis, jaundice or dysuria.   The hematochezia began more than 1 week ago (Chronic issue that has been evaluated with colonoscopy 12/07/22; patient reports having a family member that had rectal bleeding and had to have part of her intestine removed due to bleeding blood vessels). Frequency: Notices small amounts of BRBPR in stool daily with BMs; also reports noticing mucus and sometimes maroon blood; denies rectal pain or bleeding between BMs; hx of hemorrhoids. The hematochezia is a recurrent (risk factors:  Ibuprofen 2 tablets daily (decreased from 4 tablets daily)) problem.   The illness is also significant for bloating (has tried Ibgard, gas-X, and Beano) and back pain (intermittent low back pain). The illness does not include chills, anorexia, dysphagia, odynophagia or constipation (Typically has 1 BM daily, but sometimes every other day rated stool 3-4 on [place] scale; straining and feels as though something is blocking stool from getting out; during episodes of IBS has more frequent BMs). Associated symptoms comments: Colonoscopy 12/07/22 - "Diverticulosis in the sigmoid colon. External and internal hemorrhoids. The examination was otherwise normal on direct and retroflexion views. No specimens collected". Significant associated medical issues include GERD (eating small frequent meals; currently taking Pepto-Bismol p.r.n. with improvement; past treatments: zantac; hx of hiatal hernia), irritable bowel syndrome (Has tried Bentyl (unsure of effectiveness) and Ibgard (ineffective)) and " hemorrhoids. Associated medical issues do not include inflammatory bowel disease, gallstones, liver disease, alcohol abuse, PUD, gastric bypass, bowel resection or diverticulitis.     Review of Systems   Constitutional:  Negative for activity change, appetite change, chills, diaphoresis, fatigue, fever, unexpected weight change and weight loss.   HENT:  Negative for sore throat and trouble swallowing.    Respiratory:  Negative for cough, choking and shortness of breath.    Cardiovascular:  Negative for chest pain.   Gastrointestinal:  Positive for anal bleeding, bloating (has tried Ibgard, gas-X, and Beano), hematochezia (CHIEF COMPLAINT) and rectal pain (denies currently; well managed with preparation H PRN). Negative for abdominal distention, abdominal pain, anorexia, blood in stool, constipation (Typically has 1 BM daily, but sometimes every other day rated stool 3-4 on [place] scale; straining and feels as though something is blocking stool from getting out; during episodes of IBS has more frequent BMs), diarrhea, dysphagia, hematemesis, jaundice, melena, nausea and vomiting.   Genitourinary:  Negative for dysuria.   Musculoskeletal:  Positive for back pain (intermittent low back pain).       No LMP recorded (lmp unknown). Patient is postmenopausal.  Past Medical History:   Diagnosis Date    Abnormal Pap smear of cervix 2011, 2013    LEEP    Anemia     in her 20's    Esophageal reflux     Fibroids     Fibromatosis of plantar fascia     Insomnia     Irritable bowel syndrome (IBS)     Ocular migraine     Tinnitus     Urge incontinence      Past Surgical History:   Procedure Laterality Date    APPENDECTOMY  2008    CATARACT EXTRACTION Bilateral 2021    CERVICAL BIOPSY  W/ LOOP ELECTRODE EXCISION  2011    COLONOSCOPY N/A 12/11/2017    Procedure: COLONOSCOPY;  Surgeon: Angel Florez MD;  Location: [address];  Service: Endoscopy;  Laterality: N/A;    COLONOSCOPY N/A 12/7/2022    Procedure: COLONOSCOPY;   Surgeon: Elle Armenta MD;  Location: [facility];  Service: Endoscopy;  Laterality: N/A;    ENDOMETRIAL BIOPSY  2010    NASAL TURBINATE REDUCTION  2015    SUPERFICIAL KERATECTOMY Right 07/2021    TUBAL LIGATION Bilateral 1980    UPPER GASTROINTESTINAL ENDOSCOPY      WISDOM TOOTH EXTRACTION Bilateral 1973     Family History   Problem Relation Age of Onset    COPD Mother     Hyperlipidemia Mother     Hypertension Mother     Coronary artery disease Mother     Stroke Father     Hypertension Father     Coronary artery disease Father     Heart attack Father     Hyperlipidemia Father     Cancer Father     Prostate cancer Father     Diabetes Brother     Ovarian cancer Neg Hx     Colon cancer Neg Hx     Breast cancer Neg Hx     Crohn's disease Neg Hx     Ulcerative colitis Neg Hx     Stomach cancer Neg Hx     Esophageal cancer Neg Hx     Irritable bowel syndrome Neg Hx     Celiac disease Neg Hx     Rectal cancer Neg Hx     Colon polyps Neg Hx      Social History     Tobacco Use    Smoking status: Never    Smokeless tobacco: Never   Substance Use Topics    Alcohol use: No    Drug use: No     Wt Readings from Last 10 Encounters:   03/26/24 69.2 kg (152 lb 8.9 oz)   03/11/24 68.9 kg (152 lb)   02/06/24 68.7 kg (151 lb 6.4 oz)   09/11/23 68 kg (149 lb 14.6 oz)   08/29/23 68 kg (150 lb)   12/08/22 68.5 kg (151 lb)   12/07/22 69.4 kg (153 lb)   10/10/22 69.6 kg (153 lb 7 oz)   02/15/22 70.3 kg (155 lb)   10/20/21 70.5 kg (155 lb 8 oz)     Lab Results   Component Value Date    WBC 5.01 08/30/2023    HGB 12.9 08/30/2023    HCT 39.8 08/30/2023    MCV 90 08/30/2023     08/30/2023     CMP  Sodium   Date Value Ref Range Status   08/30/2023 145 136 - 145 mmol/L Final     Potassium   Date Value Ref Range Status   08/30/2023 3.9 3.5 - 5.1 mmol/L Final     Chloride   Date Value Ref Range Status   08/30/2023 107 95 - 110 mmol/L Final     CO2   Date Value Ref Range Status   08/30/2023 29 23 - 29 mmol/L Final     Glucose   Date  Value Ref Range Status   08/30/2023 92 70 - 110 mg/dL Final     BUN   Date Value Ref Range Status   08/30/2023 10 8 - 23 mg/dL Final     Creatinine   Date Value Ref Range Status   08/30/2023 0.8 0.5 - 1.4 mg/dL Final     Calcium   Date Value Ref Range Status   08/30/2023 9.3 8.7 - 10.5 mg/dL Final     Total Protein   Date Value Ref Range Status   08/30/2023 6.8 6.0 - 8.4 g/dL Final     Albumin   Date Value Ref Range Status   08/30/2023 3.8 3.5 - 5.2 g/dL Final     Total Bilirubin   Date Value Ref Range Status   08/30/2023 1.0 0.1 - 1.0 mg/dL Final     Comment:     For infants and newborns, interpretation of results should be based  on gestational age, weight and in agreement with clinical  observations.    Premature Infant recommended reference ranges:  Up to 24 hours.............<8.0 mg/dL  Up to 48 hours............<12.0 mg/dL  3-5 days..................<15.0 mg/dL  6-29 days.................<15.0 mg/dL       Alkaline Phosphatase   Date Value Ref Range Status   08/30/2023 60 55 - 135 U/L Final     AST   Date Value Ref Range Status   08/30/2023 18 10 - 40 U/L Final     ALT   Date Value Ref Range Status   08/30/2023 17 10 - 44 U/L Final     Anion Gap   Date Value Ref Range Status   08/30/2023 9 8 - 16 mmol/L Final     eGFR if    Date Value Ref Range Status   10/20/2021 >60.0 >60 mL/min/1.73 m^2 Final     eGFR if non    Date Value Ref Range Status   10/20/2021 >60.0 >60 mL/min/1.73 m^2 Final     Comment:     Calculation used to obtain the estimated glomerular filtration  rate (eGFR) is the CKD-EPI equation.        Lab Results   Component Value Date    TSH 2.462 10/20/2021     Reviewed prior medical records including radiology report of chest X-ray 04/05/18 & endoscopy history (see surgical history).    Objective:      Physical Exam  Vitals and nursing note reviewed.   Constitutional:       General: She is not in acute distress.     Appearance: Normal appearance. She is normal weight.  She is not ill-appearing.   HENT:      Mouth/Throat:      Lips: Pink. No lesions.   Eyes:      Extraocular Movements: Extraocular movements intact.      Pupils: Pupils are equal, round, and reactive to light.   Cardiovascular:      Rate and Rhythm: Normal rate and regular rhythm.      Pulses: Normal pulses.   Pulmonary:      Effort: Pulmonary effort is normal. No respiratory distress.      Breath sounds: Normal breath sounds.   Abdominal:      General: Abdomen is flat. Bowel sounds are normal. There is no distension or abdominal bruit. There are no signs of injury.      Palpations: Abdomen is soft. There is no shifting dullness, fluid wave, hepatomegaly, splenomegaly or mass.      Tenderness: There is no abdominal tenderness. There is no guarding or rebound. Negative signs include Bourne's sign, Rovsing's sign and McBurney's sign.      Hernia: No hernia is present.      Comments: Deferred rectal exam.   Skin:     General: Skin is warm and dry.      Coloration: Skin is not jaundiced or pale.   Neurological:      Mental Status: She is alert and oriented to person, place, and time.   Psychiatric:         Attention and Perception: Attention normal.         Mood and Affect: Mood normal.         Speech: Speech normal.         Behavior: Behavior normal.         Assessment:       1. Rectal bleeding    2. Hemorrhoids, unspecified hemorrhoid type    3. History of IBS    4. Abdominal bloating    5. Gastroesophageal reflux disease, unspecified whether esophagitis present    6. Screening for colon cancer          Plan:       Rectal bleeding & Hemorrhoids, unspecified hemorrhoid type  - straining with bowel movements; try using an OTC stool softener as directed and increase fiber in diet (20-30 grams daily)/OTC fiber supplement such as metamucil (take as directed)  - recommend SITZ baths  - possible referral to colorectal surgery if symptoms persist  -     Ambulatory referral/consult to General Surgery; Future; Expected date:  04/02/2024  - consider repeat colonoscopy    History of IBS  - recommended low FODMAP diet  - avoid known triggers  -START: dicyclomine (BENTYL) 10 MG capsule; Take 1 capsule (10 mg total) by mouth 4 (four) times daily as needed (abdominal cramping).  Dispense: 120 capsule; Refill: 0    Abdominal bloating  - recommend OTC simethicone as directed, such as Phazyme or Gas-x  - recommend low gas diet: Reduce or eliminate these foods from your diet: Broccoli, Cauliflower, [place] sprouts, Cabbage, Cooked dried beans, Carbonated beverages (sparkling water, soda, beer, champagne)  Other Causes Of Excess Gas Include:   1) EATING TOO FAST or TALKING WHILE YOU CHEW may cause you to swallow air. This increases the amount of gas in the stomach and may worsen your symptoms.  --> Chew each mouthful completely before swallowing. Take your time.  2) OVEREATING may increase the feeling of being bloated and cause more gas.  --> When you are full, stop eating.  3) CONSTIPATION can increase the amount of normal intestinal gas.  --> Avoid constipation by increasing the amount of fiber in your diet by including whole cereal grains, fresh vegetables (except those in the above list) and fresh fruits. High-fiber foods absorb water and carry it out of the body. When increasing the amount of fiber in your diet, you also need to increase the amount of water that you drink. You should drink at least eight 8-ounce glasses of water (two quarts) per day.    Gastroesophageal reflux disease, unspecified whether esophagitis present  -Avoid large meals, avoid eating within 2-3 hours of bedtime (avoid late night eating & lying down soon after eating), elevate head of bed if nocturnal symptoms are present, smoking cessation (if current smoker), & weight loss (if overweight).   -Avoid known foods which trigger reflux symptoms & to minimize/avoid high-fat foods, chocolate, caffeine, citrus, alcohol, & tomato products.  -Avoid/limit use of NSAID's, since  they can cause GI upset, bleeding, and/or ulcers. If needed, take with food.  -START: famotidine (PEPCID) 40 MG tablet; Take 1 tablet (40 mg total) by mouth nightly as needed for Heartburn.  Dispense: 90 tablet; Refill: 0    Screening for colon cancer   -Per Dr. Armenta's recommendations, no repeat colonoscopy due to current age (66 years or older) and the absence of colonic polyps    Follow up in about 4 weeks (around 4/23/2024), or if symptoms worsen or fail to improve.      If no improvement in symptoms or symptoms worsen, call/follow-up at clinic or go to ER.        Total time spent on the encounter includes face to face time and non-face to face time preparing to see the patient (eg, review of tests), Obtaining and/or reviewing separately obtained history, Documenting clinical information in the electronic or other health record, Independently interpreting results (not separately reported) and communicating results to the patient/family/caregiver, or Care coordination (not separately reported).

## 2024-04-05 ENCOUNTER — PATIENT MESSAGE (OUTPATIENT)
Dept: GASTROENTEROLOGY | Facility: CLINIC | Age: 69
End: 2024-04-05
Payer: MEDICARE

## 2024-04-05 ENCOUNTER — TREATMENT PLANNING (OUTPATIENT)
Dept: GASTROENTEROLOGY | Facility: CLINIC | Age: 69
End: 2024-04-05
Payer: MEDICARE

## 2024-04-05 ENCOUNTER — TELEPHONE (OUTPATIENT)
Dept: GASTROENTEROLOGY | Facility: CLINIC | Age: 69
End: 2024-04-05
Payer: MEDICARE

## 2024-04-05 NOTE — TELEPHONE ENCOUNTER
----- Message from Melinda Bryant NP sent at 4/5/2024  8:45 AM CDT -----  Please call to inform & review the results with the patient - let the patient know her sucrose breath test showed normal sucrose activity.     Sucrose Digestion (%) = 5.36  Result: Normal Sucrase Activity     Normal criteria: A 90 minutes to sucrose digestion greater than 5.1% for females or 3.91% for males is normal sucralfate activity.     Continue with previous recommendations. Please call with any questions/concerns.  Thank you,  LUZ Conde, FNP-C

## 2024-04-05 NOTE — PROGRESS NOTES
Please call to inform & review the results with the patient - let the patient know her sucrose breath test showed normal sucrose activity.    Sucrose Digestion (%) = 5.36  Result: Normal Sucrase Activity    Normal criteria: A 90 minutes to sucrose digestion greater than 5.1% for females or 3.91% for males is normal sucralfate activity.    Continue with previous recommendations. Please call with any questions/concerns.  Thank you,  LUZ Conde, FNP-C

## 2024-04-23 ENCOUNTER — PATIENT MESSAGE (OUTPATIENT)
Dept: FAMILY MEDICINE | Facility: CLINIC | Age: 69
End: 2024-04-23
Payer: MEDICARE

## 2024-04-25 ENCOUNTER — CLINICAL SUPPORT (OUTPATIENT)
Dept: FAMILY MEDICINE | Facility: CLINIC | Age: 69
End: 2024-04-25
Payer: MEDICARE

## 2024-04-25 VITALS — SYSTOLIC BLOOD PRESSURE: 164 MMHG | HEART RATE: 108 BPM | DIASTOLIC BLOOD PRESSURE: 85 MMHG

## 2024-04-25 DIAGNOSIS — Z01.30 BP CHECK: Primary | ICD-10-CM

## 2024-04-25 PROCEDURE — 99999 PR PBB SHADOW E&M-EST. PATIENT-LVL II: CPT | Mod: PBBFAC,,,

## 2024-04-25 NOTE — PROGRESS NOTES
Polina Griffin 68 y.o. female is here today for Blood Pressure check.   History of HTN no.    Review of patient's allergies indicates:   Allergen Reactions    Amoxicillin Rash     Creatinine   Date Value Ref Range Status   08/30/2023 0.8 0.5 - 1.4 mg/dL Final     Sodium   Date Value Ref Range Status   08/30/2023 145 136 - 145 mmol/L Final     Potassium   Date Value Ref Range Status   08/30/2023 3.9 3.5 - 5.1 mmol/L Final   ]  Patient denies taking blood pressure medications on a regular basis at the same time of the day.     Current Outpatient Medications:     alendronate (FOSAMAX) 70 MG tablet, Take 1 tablet (70 mg total) by mouth every 7 days. (Patient not taking: Reported on 3/26/2024), Disp: 12 tablet, Rfl: 3    alpha-D-galactosidase (BEANO ORAL), Take by mouth daily as needed., Disp: , Rfl:     CALCIUM PHOSPHATE TRIB/VIT D3 (CITRACAL + D3, CALCIUM PHOS, ORAL), Take by mouth., Disp: , Rfl:     carboxymethylcellulose (REFRESH PLUS) 0.5 % Dpet, 1 drop 3 (three) times daily as needed., Disp: , Rfl:     coenzyme Q10 100 mg capsule, Take 100 mg by mouth once daily., Disp: , Rfl:     diclofenac sodium (VOLTAREN) 1 % Gel, Apply 2 g topically once daily., Disp: 450 g, Rfl: 5    dicyclomine (BENTYL) 10 MG capsule, Take 1 capsule (10 mg total) by mouth 4 (four) times daily as needed (abdominal cramping)., Disp: 120 capsule, Rfl: 0    famotidine (PEPCID) 40 MG tablet, Take 1 tablet (40 mg total) by mouth nightly as needed for Heartburn., Disp: 90 tablet, Rfl: 0    fluticasone (FLONASE) 50 mcg/actuation nasal spray, 1 spray by Each Nare route once daily., Disp: , Rfl:     ibuprofen (ADVIL,MOTRIN) 200 MG tablet, Take 200 mg by mouth every 6 (six) hours as needed for Pain., Disp: , Rfl:     meloxicam (MOBIC) 7.5 MG tablet, Take 1 tablet (7.5 mg total) by mouth once daily. (Patient not taking: Reported on 3/26/2024), Disp: 90 tablet, Rfl: 1    metroNIDAZOLE (METROGEL) 0.75 % gel, Apply topically 2 (two) times daily., Disp: 45  g, Rfl: 1    multivitamin capsule, Take 1 capsule by mouth once daily., Disp: , Rfl:     vit C,Z-Rw-wpoco-lutein-zeaxan (PRESERVISION AREDS 2) 250-90-40-1 mg Cap, Take 1 tablet by mouth once daily., Disp: , Rfl:   Does patient have record of home blood pressure readings yes. Readings have been averaging 157/79.   Last dose of blood pressure medication was taken at n/a.  Patient is asymptomatic.   174/103 (home cuff)      Blood pressure reading after 15 minutes was 152/80, Pulse 108.  Dr. Law notified.

## 2024-05-07 ENCOUNTER — OFFICE VISIT (OUTPATIENT)
Dept: SURGERY | Facility: CLINIC | Age: 69
End: 2024-05-07
Payer: MEDICARE

## 2024-05-07 VITALS — BODY MASS INDEX: 24.13 KG/M2 | HEIGHT: 66 IN | TEMPERATURE: 99 F | WEIGHT: 150.13 LBS

## 2024-05-07 DIAGNOSIS — K62.5 RECTAL BLEEDING: ICD-10-CM

## 2024-05-07 DIAGNOSIS — K64.9 HEMORRHOIDS, UNSPECIFIED HEMORRHOID TYPE: ICD-10-CM

## 2024-05-07 PROCEDURE — 1126F AMNT PAIN NOTED NONE PRSNT: CPT | Mod: CPTII,S$GLB,, | Performed by: SURGERY

## 2024-05-07 PROCEDURE — 99999 PR PBB SHADOW E&M-EST. PATIENT-LVL III: CPT | Mod: PBBFAC,,, | Performed by: SURGERY

## 2024-05-07 PROCEDURE — 3008F BODY MASS INDEX DOCD: CPT | Mod: CPTII,S$GLB,, | Performed by: SURGERY

## 2024-05-07 PROCEDURE — 1101F PT FALLS ASSESS-DOCD LE1/YR: CPT | Mod: CPTII,S$GLB,, | Performed by: SURGERY

## 2024-05-07 PROCEDURE — 99203 OFFICE O/P NEW LOW 30 MIN: CPT | Mod: 25,S$GLB,, | Performed by: SURGERY

## 2024-05-07 PROCEDURE — 1159F MED LIST DOCD IN RCRD: CPT | Mod: CPTII,S$GLB,, | Performed by: SURGERY

## 2024-05-07 PROCEDURE — 46600 DIAGNOSTIC ANOSCOPY SPX: CPT | Mod: S$GLB,,, | Performed by: SURGERY

## 2024-05-07 PROCEDURE — 3288F FALL RISK ASSESSMENT DOCD: CPT | Mod: CPTII,S$GLB,, | Performed by: SURGERY

## 2024-05-07 RX ORDER — DICYCLOMINE HYDROCHLORIDE 10 MG/1
10 CAPSULE ORAL 2 TIMES DAILY
COMMUNITY
End: 2024-06-10 | Stop reason: SDUPTHER

## 2024-05-07 RX ORDER — ACETAMINOPHEN 500 MG
1000 TABLET ORAL 2 TIMES DAILY
COMMUNITY

## 2024-05-07 NOTE — PROGRESS NOTES
Subjective     Patient ID: Polina Griffin is a 68 y.o. female.    Chief Complaint: Consult (Hemorrhoids)    HPI   Pleasant 69 yo F who is referred to me for evaluation of hemorrhoids.  SHe notes that she has been having bleeding and mucous leakage with his BM.  NOtes that the bleeding is with most BM. Denies pain or tenderness.  She had cscope in 12/22 demonstrating mild int hemorrhoids.  She has no fever/chills. No other significant complaints.    Review of Systems   Constitutional:  Negative for activity change and appetite change.   Respiratory:  Negative for apnea.    Gastrointestinal:  Negative for abdominal distention, nausea and vomiting.   Musculoskeletal:  Negative for arthralgias.   Hematological:  Negative for adenopathy.   Psychiatric/Behavioral:  Negative for agitation and decreased concentration.           Objective     Physical Exam  Vitals reviewed.   Cardiovascular:      Rate and Rhythm: Normal rate.      Pulses: Normal pulses.   Pulmonary:      Effort: Pulmonary effort is normal.   Abdominal:      General: Abdomen is flat. There is no distension.      Tenderness: There is no abdominal tenderness.      Hernia: No hernia is present.   Genitourinary:     Comments: Ext exam demonstrates minimal ext hemorrhoids.  SINAN with normal sphincter tone.  Anoscopy demonstrating mild to moderate int hemorrhoids in the R ant, R post and L lateral position     Skin:     General: Skin is warm.   Neurological:      Mental Status: She is alert.   Psychiatric:         Mood and Affect: Mood normal.         Behavior: Behavior normal.            Assessment and Plan     1. Hemorrhoids, unspecified hemorrhoid type  -     Ambulatory referral/consult to General Surgery    2. Rectal bleeding  -     Ambulatory referral/consult to General Surgery        D/w pt.  I have recommended increasing fiber in female diet and to also begin using a fiber supplement (metamucil or psyillium husk).  Will monitor for improvement and if no  significant improvement will consider more aggressive treatment.           No follow-ups on file.

## 2024-06-08 ENCOUNTER — PATIENT MESSAGE (OUTPATIENT)
Dept: GASTROENTEROLOGY | Facility: CLINIC | Age: 69
End: 2024-06-08
Payer: MEDICARE

## 2024-06-10 ENCOUNTER — TELEPHONE (OUTPATIENT)
Dept: SURGERY | Facility: CLINIC | Age: 69
End: 2024-06-10
Payer: MEDICARE

## 2024-06-10 RX ORDER — DICYCLOMINE HYDROCHLORIDE 10 MG/1
10 CAPSULE ORAL 2 TIMES DAILY
Qty: 180 CAPSULE | Refills: 0 | Status: SHIPPED | OUTPATIENT
Start: 2024-06-10 | End: 2024-06-12 | Stop reason: SDUPTHER

## 2024-06-10 NOTE — TELEPHONE ENCOUNTER
I called patient and she wanted ti schedule the hemorrhoid banding with Dr. Phillips.  I /scheduled her in [place] on Tuesday,  @ 3pm. Thomas

## 2024-06-12 RX ORDER — DICYCLOMINE HYDROCHLORIDE 10 MG/1
10 CAPSULE ORAL 2 TIMES DAILY
Qty: 180 CAPSULE | Refills: 1 | Status: SHIPPED | OUTPATIENT
Start: 2024-06-12

## 2024-07-23 ENCOUNTER — TELEPHONE (OUTPATIENT)
Dept: SURGERY | Facility: CLINIC | Age: 69
End: 2024-07-23
Payer: MEDICARE

## 2024-07-23 NOTE — TELEPHONE ENCOUNTER
Patient called back and I informed her that I need to reschedule her appointment on Tuesday, 7/30/24 @ 3pm in [place] due to a change in Dr. Phillips schedule.  I rescheduled her to be seen in [place] on Wednesday, 7/21/24 @ 1:30pm.  Patient understood. Thomas

## 2024-07-23 NOTE — TELEPHONE ENCOUNTER
ASTONOR @ 2:57pm for patient to call back to reschedule her appointment on Tuesday, 7/30/24 @ 3pm with Dr. Phillips in [place] due to a change in his schedule.  Thomas

## 2024-07-24 ENCOUNTER — OFFICE VISIT (OUTPATIENT)
Dept: PODIATRY | Facility: CLINIC | Age: 69
End: 2024-07-24
Payer: MEDICARE

## 2024-07-24 VITALS
HEIGHT: 66 IN | WEIGHT: 154 LBS | BODY MASS INDEX: 24.75 KG/M2 | SYSTOLIC BLOOD PRESSURE: 155 MMHG | HEART RATE: 92 BPM | DIASTOLIC BLOOD PRESSURE: 83 MMHG

## 2024-07-24 DIAGNOSIS — L85.3 XEROSIS OF SKIN: ICD-10-CM

## 2024-07-24 DIAGNOSIS — L03.031 PARONYCHIA OF GREAT TOE, RIGHT: ICD-10-CM

## 2024-07-24 DIAGNOSIS — L60.0 INGROWN NAIL: Primary | ICD-10-CM

## 2024-07-24 PROCEDURE — 99999 PR PBB SHADOW E&M-EST. PATIENT-LVL IV: CPT | Mod: PBBFAC,,, | Performed by: PODIATRIST

## 2024-07-24 PROCEDURE — 3288F FALL RISK ASSESSMENT DOCD: CPT | Mod: CPTII,S$GLB,, | Performed by: PODIATRIST

## 2024-07-24 PROCEDURE — 1160F RVW MEDS BY RX/DR IN RCRD: CPT | Mod: CPTII,S$GLB,, | Performed by: PODIATRIST

## 2024-07-24 PROCEDURE — 3008F BODY MASS INDEX DOCD: CPT | Mod: CPTII,S$GLB,, | Performed by: PODIATRIST

## 2024-07-24 PROCEDURE — 1101F PT FALLS ASSESS-DOCD LE1/YR: CPT | Mod: CPTII,S$GLB,, | Performed by: PODIATRIST

## 2024-07-24 PROCEDURE — 1159F MED LIST DOCD IN RCRD: CPT | Mod: CPTII,S$GLB,, | Performed by: PODIATRIST

## 2024-07-24 PROCEDURE — 99203 OFFICE O/P NEW LOW 30 MIN: CPT | Mod: S$GLB,,, | Performed by: PODIATRIST

## 2024-07-24 PROCEDURE — 3079F DIAST BP 80-89 MM HG: CPT | Mod: CPTII,S$GLB,, | Performed by: PODIATRIST

## 2024-07-24 PROCEDURE — 3077F SYST BP >= 140 MM HG: CPT | Mod: CPTII,S$GLB,, | Performed by: PODIATRIST

## 2024-07-24 PROCEDURE — 1125F AMNT PAIN NOTED PAIN PRSNT: CPT | Mod: CPTII,S$GLB,, | Performed by: PODIATRIST

## 2024-07-27 PROBLEM — L03.031 PARONYCHIA OF GREAT TOE, RIGHT: Status: ACTIVE | Noted: 2024-07-27

## 2024-07-27 PROBLEM — L60.0 INGROWN NAIL: Status: ACTIVE | Noted: 2024-07-27

## 2024-07-27 PROBLEM — L85.3 XEROSIS OF SKIN: Status: ACTIVE | Noted: 2024-07-27

## 2024-07-27 NOTE — PROGRESS NOTES
Subjective:       Patient ID: Polina Griffin is a 68 y.o. female.    Chief Complaint: Ingrown Toenail (Right great toe)  Patient presents today for a new patient evaluation she is complaining about an ingrown toenail on her right great toe that is been bothering her for about 2 weeks she also has a corn underneath the little toe on the right foot.  Patient states she has never had an ingrown toenail before this.    Past Medical History:   Diagnosis Date    Abnormal Pap smear of cervix 2011, 2013    LEEP    Anemia     in her 20's    Esophageal reflux     Fibroids     Fibromatosis of plantar fascia     Insomnia     Irritable bowel syndrome (IBS)     Ocular migraine     Tinnitus     Urge incontinence      Past Surgical History:   Procedure Laterality Date    APPENDECTOMY  2008    CATARACT EXTRACTION Bilateral 2021    CERVICAL BIOPSY  W/ LOOP ELECTRODE EXCISION  2011    COLONOSCOPY N/A 12/11/2017    Procedure: COLONOSCOPY;  Surgeon: Angel Florez MD;  Location: [facility] ENDO ([address] [place] [address]);  Service: Endoscopy;  Laterality: N/A;    COLONOSCOPY N/A 12/7/2022    Procedure: COLONOSCOPY;  Surgeon: Elle Armenta MD;  Location: [facility] END[facility];  Service: Endoscopy;  Laterality: N/A;    ENDOMETRIAL BIOPSY  2010    NASAL TURBINATE REDUCTION  2015    SUPERFICIAL KERATECTOMY Right 07/2021    TUBAL LIGATION Bilateral 1980    UPPER GASTROINTESTINAL ENDOSCOPY      WISDOM TOOTH EXTRACTION Bilateral 1973     Family History   Problem Relation Name Age of Onset    COPD Mother      Hyperlipidemia Mother      Hypertension Mother      Coronary artery disease Mother      Stroke Father      Hypertension Father      Coronary artery disease Father      Heart attack Father      Hyperlipidemia Father      Cancer Father      Prostate cancer Father      Diabetes Brother      Ovarian cancer Neg Hx      Colon cancer Neg Hx      Breast cancer Neg Hx      Crohn's disease Neg Hx      Ulcerative colitis Neg Hx      Stomach cancer Neg Hx      Esophageal cancer  Neg Hx      Irritable bowel syndrome Neg Hx      Celiac disease Neg Hx      Rectal cancer Neg Hx      Colon polyps Neg Hx       Social History     Socioeconomic History    Marital status:    Tobacco Use    Smoking status: Never    Smokeless tobacco: Never   Substance and Sexual Activity    Alcohol use: No    Drug use: No    Sexual activity: Not Currently     Partners: Male     Birth control/protection: None, Post-menopausal     Comment: :      Social Determinants of Health     Financial Resource Strain: Low Risk  (2/1/2024)    Overall Financial Resource Strain (CARDIA)     Difficulty of Paying Living Expenses: Not very hard   Food Insecurity: No Food Insecurity (2/1/2024)    Hunger Vital Sign     Worried About Running Out of Food in the Last Year: Never true     Ran Out of Food in the Last Year: Never true   Transportation Needs: No Transportation Needs (2/1/2024)    PRAPARE - Transportation     Lack of Transportation (Medical): No     Lack of Transportation (Non-Medical): No   Physical Activity: Sufficiently Active (2/1/2024)    Exercise Vital Sign     Days of Exercise per Week: 5 days     Minutes of Exercise per Session: 60 min   Stress: No Stress Concern Present (2/1/2024)    [employer] - Occupational Stress Questionnaire     Feeling of Stress : Only a little   Housing Stability: Low Risk  (2/1/2024)    Housing Stability Vital Sign     Unable to Pay for Housing in the Last Year: No     Number of Places Lived in the Last Year: 1     Unstable Housing in the Last Year: No       Current Outpatient Medications   Medication Sig Dispense Refill    acetaminophen (TYLENOL) 500 MG tablet Take 1,000 mg by mouth 2 (two) times a day.      alpha-D-galactosidase (BEANO ORAL) Take by mouth daily as needed.      CALCIUM PHOSPHATE TRIB/VIT D3 (CITRACAL + D3, CALCIUM PHOS, ORAL) Take by mouth.      carboxymethylcellulose (REFRESH PLUS) 0.5 % Dpet 1 drop 3 (three) times daily as needed.    "   coenzyme Q10 100 mg capsule Take 100 mg by mouth once daily.      diclofenac sodium (VOLTAREN) 1 % Gel Apply 2 g topically once daily. 450 g 5    dicyclomine (BENTYL) 10 MG capsule Take 1 capsule (10 mg total) by mouth 2 (two) times daily. 180 capsule 1    famotidine (PEPCID) 40 MG tablet Take 1 tablet (40 mg total) by mouth nightly as needed for Heartburn. 90 tablet 0    fluticasone (FLONASE) 50 mcg/actuation nasal spray 1 spray by Each Nostril route once daily.      ibuprofen (ADVIL,MOTRIN) 200 MG tablet Take 200 mg by mouth every 6 (six) hours as needed for Pain.      metroNIDAZOLE (METROGEL) 0.75 % gel Apply topically 2 (two) times daily. 45 g 1    multivitamin capsule Take 1 capsule by mouth once daily.      vit C,N-Ln-votep-lutein-zeaxan (PRESERVISION AREDS 2) 250-90-40-1 mg Cap Take 1 tablet by mouth once daily.       No current facility-administered medications for this visit.     Review of patient's allergies indicates:   Allergen Reactions    Amoxicillin Rash       Review of Systems   Musculoskeletal:  Positive for arthralgias.   All other systems reviewed and are negative.      Objective:      Vitals:    07/24/24 1335   BP: (!) 155/83   BP Location: Left arm   Patient Position: Sitting   Pulse: 92   Weight: 69.9 kg (154 lb)   Height: 5' 6" (1.676 m)     Physical Exam  Vitals and nursing note reviewed.   Constitutional:       Appearance: Normal appearance.   Cardiovascular:      Pulses:           Dorsalis pedis pulses are 2+ on the right side and 2+ on the left side.        Posterior tibial pulses are 2+ on the right side and 2+ on the left side.   Pulmonary:      Effort: Pulmonary effort is normal.   Musculoskeletal:         General: Swelling and tenderness present.      Right foot: Prominent metatarsal heads present.      Left foot: Prominent metatarsal heads present.   Feet:      Right foot:      Protective Sensation: 2 sites tested.  2 sites sensed.      Skin integrity: Erythema, callus and dry skin " present.      Toenail Condition: Right toenails are ingrown.      Left foot:      Protective Sensation: 2 sites tested.  2 sites sensed.      Skin integrity: Dry skin present.   Skin:     Capillary Refill: Capillary refill takes less than 2 seconds.      Findings: Erythema present.   Neurological:      General: No focal deficit present.      Mental Status: She is alert.   Psychiatric:         Mood and Affect: Mood normal.         Behavior: Behavior normal.            Assessment:       1. Ingrown nail    2. Paronychia of great toe, right    3. Xerosis of skin        Plan:       Patient presents today for a new patient evaluation she is complaining about an ingrown toenail on her right great toe that is been bothering her for about 2 weeks she also has a corn underneath the little toe on the right foot.  Patient states she has never had an ingrown toenail before this.  A comprehensive new patient evaluation was performed today patient did have significantly incurvated ingrown nail with early signs of infection along the distal lateral border of the patient's right hallux.  Patient advised it is very likely that the 1st and 2nd digits rubbing together may have caused and contributed to the ingrowing nail I have recommended the use of a toe spacer to keep the toe  to prevent the rubbing I was able to aggressively trim and remove the ingrowing nail bacitracin ointment and a light dressing was applied patient also had a pre ulcerative hyperkeratotic lesion sub 5th metatarsal right I have advised the patient she needs to keep this area well moisturized well hydrated this could become painful in the future if it become deeper I did debride this area and recommended the use of a 40% urea cream daily to assist with the excessively dry skin and callus tissue.  Recommended follow-up as needed patient advised if she is not completely pain-free in the next 4-5 days to contact us for follow-up as needed she did not  require a nail avulsion at this time.This note was created using [employer] voice recognition software that occasionally misinterpreted phrases or words.

## 2024-08-06 ENCOUNTER — OFFICE VISIT (OUTPATIENT)
Dept: FAMILY MEDICINE | Facility: CLINIC | Age: 69
End: 2024-08-06
Payer: MEDICARE

## 2024-08-06 ENCOUNTER — PATIENT MESSAGE (OUTPATIENT)
Dept: FAMILY MEDICINE | Facility: CLINIC | Age: 69
End: 2024-08-06

## 2024-08-06 ENCOUNTER — LAB VISIT (OUTPATIENT)
Dept: LAB | Facility: HOSPITAL | Age: 69
End: 2024-08-06
Attending: FAMILY MEDICINE
Payer: MEDICARE

## 2024-08-06 VITALS
DIASTOLIC BLOOD PRESSURE: 68 MMHG | OXYGEN SATURATION: 100 % | BODY MASS INDEX: 24.91 KG/M2 | HEIGHT: 66 IN | RESPIRATION RATE: 16 BRPM | SYSTOLIC BLOOD PRESSURE: 118 MMHG | WEIGHT: 155 LBS | HEART RATE: 97 BPM

## 2024-08-06 DIAGNOSIS — E53.8 DEFICIENCY OF OTHER SPECIFIED B GROUP VITAMINS: ICD-10-CM

## 2024-08-06 DIAGNOSIS — Z00.00 ROUTINE MEDICAL EXAM: ICD-10-CM

## 2024-08-06 DIAGNOSIS — Z13.1 DIABETES MELLITUS SCREENING: ICD-10-CM

## 2024-08-06 DIAGNOSIS — Z00.00 ROUTINE MEDICAL EXAM: Primary | ICD-10-CM

## 2024-08-06 DIAGNOSIS — K21.9 GASTROESOPHAGEAL REFLUX DISEASE, UNSPECIFIED WHETHER ESOPHAGITIS PRESENT: ICD-10-CM

## 2024-08-06 DIAGNOSIS — Z13.220 ENCOUNTER FOR LIPID SCREENING FOR CARDIOVASCULAR DISEASE: ICD-10-CM

## 2024-08-06 DIAGNOSIS — Z13.29 THYROID DISORDER SCREEN: ICD-10-CM

## 2024-08-06 DIAGNOSIS — E68 SEQUELAE OF HYPERALIMENTATION: ICD-10-CM

## 2024-08-06 DIAGNOSIS — R79.9 ABNORMAL FINDING OF BLOOD CHEMISTRY, UNSPECIFIED: ICD-10-CM

## 2024-08-06 DIAGNOSIS — I10 ESSENTIAL (PRIMARY) HYPERTENSION: ICD-10-CM

## 2024-08-06 DIAGNOSIS — Z13.6 ENCOUNTER FOR LIPID SCREENING FOR CARDIOVASCULAR DISEASE: ICD-10-CM

## 2024-08-06 DIAGNOSIS — R80.9 MICROALBUMINURIA: Primary | ICD-10-CM

## 2024-08-06 DIAGNOSIS — Z12.31 ENCOUNTER FOR SCREENING MAMMOGRAM FOR MALIGNANT NEOPLASM OF BREAST: ICD-10-CM

## 2024-08-06 LAB
25(OH)D3+25(OH)D2 SERPL-MCNC: 55 NG/ML (ref 30–96)
ALBUMIN SERPL BCP-MCNC: 4 G/DL (ref 3.5–5.2)
ALBUMIN/CREAT UR: 257.1 UG/MG (ref 0–30)
ALP SERPL-CCNC: 71 U/L (ref 55–135)
ALT SERPL W/O P-5'-P-CCNC: 15 U/L (ref 10–44)
ANION GAP SERPL CALC-SCNC: 10 MMOL/L (ref 8–16)
AST SERPL-CCNC: 23 U/L (ref 10–40)
BASOPHILS # BLD AUTO: 0.04 K/UL (ref 0–0.2)
BASOPHILS NFR BLD: 0.8 % (ref 0–1.9)
BILIRUB SERPL-MCNC: 0.8 MG/DL (ref 0.1–1)
BUN SERPL-MCNC: 12 MG/DL (ref 8–23)
CALCIUM SERPL-MCNC: 9.5 MG/DL (ref 8.7–10.5)
CHLORIDE SERPL-SCNC: 108 MMOL/L (ref 95–110)
CHOLEST SERPL-MCNC: 204 MG/DL (ref 120–199)
CHOLEST/HDLC SERPL: 4.5 {RATIO} (ref 2–5)
CO2 SERPL-SCNC: 27 MMOL/L (ref 23–29)
CREAT SERPL-MCNC: 0.7 MG/DL (ref 0.5–1.4)
CREAT UR-MCNC: 42 MG/DL (ref 15–325)
DIFFERENTIAL METHOD BLD: NORMAL
EOSINOPHIL # BLD AUTO: 0.1 K/UL (ref 0–0.5)
EOSINOPHIL NFR BLD: 1.1 % (ref 0–8)
ERYTHROCYTE [DISTWIDTH] IN BLOOD BY AUTOMATED COUNT: 13.2 % (ref 11.5–14.5)
EST. GFR  (NO RACE VARIABLE): >60 ML/MIN/1.73 M^2
ESTIMATED AVG GLUCOSE: 100 MG/DL (ref 68–131)
GLUCOSE SERPL-MCNC: 100 MG/DL (ref 70–110)
HBA1C MFR BLD: 5.1 % (ref 4–5.6)
HCT VFR BLD AUTO: 41 % (ref 37–48.5)
HDLC SERPL-MCNC: 45 MG/DL (ref 40–75)
HDLC SERPL: 22.1 % (ref 20–50)
HGB BLD-MCNC: 13.2 G/DL (ref 12–16)
IMM GRANULOCYTES # BLD AUTO: 0.02 K/UL (ref 0–0.04)
IMM GRANULOCYTES NFR BLD AUTO: 0.4 % (ref 0–0.5)
LDLC SERPL CALC-MCNC: 132.4 MG/DL (ref 63–159)
LYMPHOCYTES # BLD AUTO: 1.1 K/UL (ref 1–4.8)
LYMPHOCYTES NFR BLD: 21.5 % (ref 18–48)
MCH RBC QN AUTO: 28.6 PG (ref 27–31)
MCHC RBC AUTO-ENTMCNC: 32.2 G/DL (ref 32–36)
MCV RBC AUTO: 89 FL (ref 82–98)
MICROALBUMIN UR DL<=1MG/L-MCNC: 108 UG/ML
MONOCYTES # BLD AUTO: 0.3 K/UL (ref 0.3–1)
MONOCYTES NFR BLD: 5.2 % (ref 4–15)
NEUTROPHILS # BLD AUTO: 3.7 K/UL (ref 1.8–7.7)
NEUTROPHILS NFR BLD: 71 % (ref 38–73)
NONHDLC SERPL-MCNC: 159 MG/DL
NRBC BLD-RTO: 0 /100 WBC
PLATELET # BLD AUTO: 199 K/UL (ref 150–450)
PMV BLD AUTO: 10 FL (ref 9.2–12.9)
POTASSIUM SERPL-SCNC: 4.1 MMOL/L (ref 3.5–5.1)
PROT SERPL-MCNC: 7.2 G/DL (ref 6–8.4)
RBC # BLD AUTO: 4.61 M/UL (ref 4–5.4)
SODIUM SERPL-SCNC: 145 MMOL/L (ref 136–145)
TRIGL SERPL-MCNC: 133 MG/DL (ref 30–150)
TSH SERPL DL<=0.005 MIU/L-ACNC: 3.01 UIU/ML (ref 0.4–4)
VIT B12 SERPL-MCNC: 736 PG/ML (ref 210–950)
WBC # BLD AUTO: 5.22 K/UL (ref 3.9–12.7)

## 2024-08-06 PROCEDURE — 99214 OFFICE O/P EST MOD 30 MIN: CPT | Mod: S$GLB,,, | Performed by: FAMILY MEDICINE

## 2024-08-06 PROCEDURE — 3074F SYST BP LT 130 MM HG: CPT | Mod: CPTII,S$GLB,, | Performed by: FAMILY MEDICINE

## 2024-08-06 PROCEDURE — G2211 COMPLEX E/M VISIT ADD ON: HCPCS | Mod: S$GLB,,, | Performed by: FAMILY MEDICINE

## 2024-08-06 PROCEDURE — 83036 HEMOGLOBIN GLYCOSYLATED A1C: CPT | Performed by: FAMILY MEDICINE

## 2024-08-06 PROCEDURE — 3008F BODY MASS INDEX DOCD: CPT | Mod: CPTII,S$GLB,, | Performed by: FAMILY MEDICINE

## 2024-08-06 PROCEDURE — 36415 COLL VENOUS BLD VENIPUNCTURE: CPT | Performed by: FAMILY MEDICINE

## 2024-08-06 PROCEDURE — 99999 PR PBB SHADOW E&M-EST. PATIENT-LVL III: CPT | Mod: PBBFAC,,, | Performed by: FAMILY MEDICINE

## 2024-08-06 PROCEDURE — 80053 COMPREHEN METABOLIC PANEL: CPT | Performed by: FAMILY MEDICINE

## 2024-08-06 PROCEDURE — 1126F AMNT PAIN NOTED NONE PRSNT: CPT | Mod: CPTII,S$GLB,, | Performed by: FAMILY MEDICINE

## 2024-08-06 PROCEDURE — 80061 LIPID PANEL: CPT | Performed by: FAMILY MEDICINE

## 2024-08-06 PROCEDURE — 1159F MED LIST DOCD IN RCRD: CPT | Mod: CPTII,S$GLB,, | Performed by: FAMILY MEDICINE

## 2024-08-06 PROCEDURE — 82306 VITAMIN D 25 HYDROXY: CPT | Performed by: FAMILY MEDICINE

## 2024-08-06 PROCEDURE — 3078F DIAST BP <80 MM HG: CPT | Mod: CPTII,S$GLB,, | Performed by: FAMILY MEDICINE

## 2024-08-06 PROCEDURE — 82570 ASSAY OF URINE CREATININE: CPT | Performed by: FAMILY MEDICINE

## 2024-08-06 PROCEDURE — 84443 ASSAY THYROID STIM HORMONE: CPT | Performed by: FAMILY MEDICINE

## 2024-08-06 PROCEDURE — 82043 UR ALBUMIN QUANTITATIVE: CPT | Performed by: FAMILY MEDICINE

## 2024-08-06 PROCEDURE — 85025 COMPLETE CBC W/AUTO DIFF WBC: CPT | Performed by: FAMILY MEDICINE

## 2024-08-06 PROCEDURE — 82607 VITAMIN B-12: CPT | Performed by: FAMILY MEDICINE

## 2024-08-12 ENCOUNTER — E-VISIT (OUTPATIENT)
Dept: FAMILY MEDICINE | Facility: CLINIC | Age: 69
End: 2024-08-12
Payer: MEDICARE

## 2024-08-12 DIAGNOSIS — L28.2 PRURITIC RASH: Primary | ICD-10-CM

## 2024-08-13 RX ORDER — LEVOCETIRIZINE DIHYDROCHLORIDE 5 MG/1
5 TABLET, FILM COATED ORAL NIGHTLY
Qty: 90 TABLET | Refills: 3 | Status: SHIPPED | OUTPATIENT
Start: 2024-08-13 | End: 2025-08-13

## 2024-08-13 RX ORDER — PREDNISONE 20 MG/1
20 TABLET ORAL DAILY
Qty: 5 TABLET | Refills: 0 | Status: SHIPPED | OUTPATIENT
Start: 2024-08-13

## 2024-08-13 NOTE — PROGRESS NOTES
Subjective:       Patient ID: Polina Griffin is a 68 y.o. female.    Chief Complaint: General Illness (Entered automatically based on patient selection in click[employer].)  Ms. Griffin presents today via E visit for a rash on her posterior scalp, chest and lower back.  She states that there is no real rash but she is irritated from itching and it seems to pop up in different areas in her upper body.  She states it started proximally 2 weeks ago.  Since this is an E visit it was difficult to evaluate for a rash.  She did provide 1 picture but it is not helpful as it was blurry.  She states that she did do some deep cleaning at her father's house and was exposed to a lot of dust and perhaps it is the dust that is causing her reaction.  If this is an allergic reaction, antihistamines and steroids would be in order.  If this does not help, she likely needs an inpatient appointment so that the rash can be visualized  Past Medical History:   Diagnosis Date    Abnormal Pap smear of cervix 2011, 2013    LEEP    Anemia     in her 20's    Esophageal reflux     Fibroids     Fibromatosis of plantar fascia     Insomnia     Irritable bowel syndrome (IBS)     Ocular migraine     Tinnitus     Urge incontinence        Past Surgical History:   Procedure Laterality Date    APPENDECTOMY  2008    CATARACT EXTRACTION Bilateral 2021    CERVICAL BIOPSY  W/ LOOP ELECTRODE EXCISION  2011    COLONOSCOPY N/A 12/11/2017    Procedure: COLONOSCOPY;  Surgeon: Angel Florez MD;  Location: [address];  Service: Endoscopy;  Laterality: N/A;    COLONOSCOPY N/A 12/7/2022    Procedure: COLONOSCOPY;  Surgeon: Elle Armenta MD;  Location: [facility];  Service: Endoscopy;  Laterality: N/A;    ENDOMETRIAL BIOPSY  2010    NASAL TURBINATE REDUCTION  2015    SUPERFICIAL KERATECTOMY Right 07/2021    TUBAL LIGATION Bilateral 1980    UPPER GASTROINTESTINAL ENDOSCOPY      WISDOM TOOTH EXTRACTION Bilateral 1973        Social History     Socioeconomic  History    Marital status:    Tobacco Use    Smoking status: Never    Smokeless tobacco: Never   Substance and Sexual Activity    Alcohol use: No    Drug use: No    Sexual activity: Not Currently     Partners: Male     Birth control/protection: None, Post-menopausal     Comment: :      Social Determinants of Health     Financial Resource Strain: Low Risk  (2/1/2024)    Overall Financial Resource Strain (CARDIA)     Difficulty of Paying Living Expenses: Not very hard   Food Insecurity: No Food Insecurity (2/1/2024)    Hunger Vital Sign     Worried About Running Out of Food in the Last Year: Never true     Ran Out of Food in the Last Year: Never true   Transportation Needs: No Transportation Needs (2/1/2024)    PRAPARE - Transportation     Lack of Transportation (Medical): No     Lack of Transportation (Non-Medical): No   Physical Activity: Sufficiently Active (2/1/2024)    Exercise Vital Sign     Days of Exercise per Week: 5 days     Minutes of Exercise per Session: 60 min   Stress: No Stress Concern Present (2/1/2024)    [employer] - Occupational Stress Questionnaire     Feeling of Stress : Only a little   Housing Stability: Low Risk  (2/1/2024)    Housing Stability Vital Sign     Unable to Pay for Housing in the Last Year: No     Number of Places Lived in the Last Year: 1     Unstable Housing in the Last Year: No       Family History   Problem Relation Name Age of Onset    COPD Mother      Hyperlipidemia Mother      Hypertension Mother      Coronary artery disease Mother      Stroke Father      Hypertension Father      Coronary artery disease Father      Heart attack Father      Hyperlipidemia Father      Cancer Father      Prostate cancer Father      Diabetes Brother      Ovarian cancer Neg Hx      Colon cancer Neg Hx      Breast cancer Neg Hx      Crohn's disease Neg Hx      Ulcerative colitis Neg Hx      Stomach cancer Neg Hx      Esophageal cancer Neg Hx       Irritable bowel syndrome Neg Hx      Celiac disease Neg Hx      Rectal cancer Neg Hx      Colon polyps Neg Hx         Review of patient's allergies indicates:   Allergen Reactions    Amoxicillin Rash          Current Outpatient Medications:     alpha-D-galactosidase (BEANO ORAL), Take by mouth daily as needed., Disp: , Rfl:     CALCIUM PHOSPHATE TRIB/VIT D3 (CITRACAL + D3, CALCIUM PHOS, ORAL), Take by mouth., Disp: , Rfl:     carboxymethylcellulose (REFRESH PLUS) 0.5 % Dpet, 1 drop 3 (three) times daily as needed., Disp: , Rfl:     coenzyme Q10 100 mg capsule, Take 100 mg by mouth once daily., Disp: , Rfl:     diclofenac sodium (VOLTAREN) 1 % Gel, Apply 2 g topically once daily., Disp: 450 g, Rfl: 5    dicyclomine (BENTYL) 10 MG capsule, Take 1 capsule (10 mg total) by mouth 2 (two) times daily., Disp: 180 capsule, Rfl: 1    fluticasone (FLONASE) 50 mcg/actuation nasal spray, 1 spray by Each Nostril route once daily., Disp: , Rfl:     ibuprofen (ADVIL,MOTRIN) 200 MG tablet, Take 200 mg by mouth every 6 (six) hours as needed for Pain., Disp: , Rfl:     levocetirizine (XYZAL) 5 MG tablet, Take 1 tablet (5 mg total) by mouth every evening., Disp: 90 tablet, Rfl: 3    metroNIDAZOLE (METROGEL) 0.75 % gel, Apply topically 2 (two) times daily., Disp: 45 g, Rfl: 1    multivitamin capsule, Take 1 capsule by mouth once daily., Disp: , Rfl:     predniSONE (DELTASONE) 20 MG tablet, Take 1 tablet (20 mg total) by mouth once daily., Disp: 5 tablet, Rfl: 0    vit C,G-Bw-faekp-lutein-zeaxan (PRESERVISION AREDS 2) 250-90-40-1 mg Cap, Take 1 tablet by mouth once daily., Disp: , Rfl:     HPI  Review of Systems   Skin:  Positive for rash.        Pruritic rash to the posterior scalp back and chest       Objective:      Physical Exam  Skin:     Findings: Rash present.      Comments: Rash to posterior scalp, chest and back.  Pruritic.  Not easily visualized in this E visit as the photo is blurry         Assessment:       1. Pruritic rash         Plan:         Pruritic rash  -     predniSONE (DELTASONE) 20 MG tablet; Take 1 tablet (20 mg total) by mouth once daily.  Dispense: 5 tablet; Refill: 0  -     levocetirizine (XYZAL) 5 MG tablet; Take 1 tablet (5 mg total) by mouth every evening.  Dispense: 90 tablet; Refill: 3      Return to clinic if rash persists  This visit required 11 minutes to fully review the chart, medications, allergies, most recent office visit and documentation of this E visit    Risks, benefits, and side effects were discussed with the patient. All questions were answered to the fullest satisfaction of the patient, and pt verbalized understanding and agreement to treatment plan. Pt was to call with any new or worsening symptoms, or present to the ER.        Marisol Law MD

## 2024-08-21 ENCOUNTER — OFFICE VISIT (OUTPATIENT)
Dept: SURGERY | Facility: CLINIC | Age: 69
End: 2024-08-21
Payer: MEDICARE

## 2024-08-21 VITALS
HEIGHT: 66 IN | SYSTOLIC BLOOD PRESSURE: 113 MMHG | BODY MASS INDEX: 24.94 KG/M2 | HEART RATE: 86 BPM | TEMPERATURE: 97 F | DIASTOLIC BLOOD PRESSURE: 63 MMHG | WEIGHT: 155.19 LBS

## 2024-08-21 DIAGNOSIS — K62.5 RECTAL BLEEDING: Primary | ICD-10-CM

## 2024-08-21 PROCEDURE — 99999 PR PBB SHADOW E&M-EST. PATIENT-LVL IV: CPT | Mod: PBBFAC,,, | Performed by: SURGERY

## 2024-08-21 PROCEDURE — 99499 UNLISTED E&M SERVICE: CPT | Mod: S$GLB,,, | Performed by: SURGERY

## 2024-08-21 PROCEDURE — 46221 LIGATION OF HEMORRHOID(S): CPT | Mod: S$GLB,,, | Performed by: SURGERY

## 2024-08-21 RX ORDER — NAPROXEN SODIUM 220 MG
220 TABLET ORAL 2 TIMES DAILY WITH MEALS
COMMUNITY
Start: 2024-08-09

## 2024-08-21 NOTE — PROGRESS NOTES
68-year-old female with whom I am familiar.  She returns today to discuss banding.  Continues to have mucus leakage and occasional rectal bleeding.  This is despite fiber therapy.  She would like to proceed with banding     Afebrile vital signs stable   Rectal: External exam demonstrates no significant external hemorrhoids.  Digital rectal exam with normal sphincter tone.  Anoscopy is performed demonstrating moderate internal hemorrhoids in each of the 3 columns.    Bleeding hemorrhoids hemorrhoid leakage     Discussion with the patient.  I have recommended and offered proceeding with banding.  Patient state  she would like to have this done today.  Having received informed consent pt was placed in prone jackknife position. I then placed rubber bands on the L lateral column, R Ant and R post columns without event. Bleeding was minimal and pt tolerated the procedure well.

## 2024-09-09 ENCOUNTER — PATIENT MESSAGE (OUTPATIENT)
Dept: SURGERY | Facility: CLINIC | Age: 69
End: 2024-09-09
Payer: MEDICARE

## 2024-09-09 RX ORDER — DICYCLOMINE HYDROCHLORIDE 10 MG/1
10 CAPSULE ORAL 2 TIMES DAILY
Qty: 180 CAPSULE | Refills: 1 | Status: SHIPPED | OUTPATIENT
Start: 2024-09-09

## 2024-09-19 ENCOUNTER — TELEPHONE (OUTPATIENT)
Dept: SURGERY | Facility: CLINIC | Age: 69
End: 2024-09-19
Payer: MEDICARE

## 2024-09-19 NOTE — TELEPHONE ENCOUNTER
I called patient @ 1:21pm to inform her that due to a change in Dr. Phillips schedule on Thursday, 9/26/24 that I would have to change her 8:30am to 9:45am in [place].  No answer.   [employer] message sent to patient to inform her of the time change to 9:45am. Thomas

## 2024-09-26 ENCOUNTER — OFFICE VISIT (OUTPATIENT)
Dept: SURGERY | Facility: CLINIC | Age: 69
End: 2024-09-26
Payer: MEDICARE

## 2024-09-26 VITALS
HEART RATE: 80 BPM | WEIGHT: 155.63 LBS | TEMPERATURE: 98 F | SYSTOLIC BLOOD PRESSURE: 141 MMHG | DIASTOLIC BLOOD PRESSURE: 65 MMHG | HEIGHT: 66 IN | BODY MASS INDEX: 25.01 KG/M2

## 2024-09-26 DIAGNOSIS — K62.5 RECTAL BLEEDING: Primary | ICD-10-CM

## 2024-09-26 PROCEDURE — 3044F HG A1C LEVEL LT 7.0%: CPT | Mod: CPTII,S$GLB,, | Performed by: SURGERY

## 2024-09-26 PROCEDURE — 3060F POS MICROALBUMINURIA REV: CPT | Mod: CPTII,S$GLB,, | Performed by: SURGERY

## 2024-09-26 PROCEDURE — 1101F PT FALLS ASSESS-DOCD LE1/YR: CPT | Mod: CPTII,S$GLB,, | Performed by: SURGERY

## 2024-09-26 PROCEDURE — 3288F FALL RISK ASSESSMENT DOCD: CPT | Mod: CPTII,S$GLB,, | Performed by: SURGERY

## 2024-09-26 PROCEDURE — 99999 PR PBB SHADOW E&M-EST. PATIENT-LVL IV: CPT | Mod: PBBFAC,,, | Performed by: SURGERY

## 2024-09-26 PROCEDURE — 3078F DIAST BP <80 MM HG: CPT | Mod: CPTII,S$GLB,, | Performed by: SURGERY

## 2024-09-26 PROCEDURE — 3077F SYST BP >= 140 MM HG: CPT | Mod: CPTII,S$GLB,, | Performed by: SURGERY

## 2024-09-26 PROCEDURE — 3066F NEPHROPATHY DOC TX: CPT | Mod: CPTII,S$GLB,, | Performed by: SURGERY

## 2024-09-26 PROCEDURE — 1126F AMNT PAIN NOTED NONE PRSNT: CPT | Mod: CPTII,S$GLB,, | Performed by: SURGERY

## 2024-09-26 PROCEDURE — 3008F BODY MASS INDEX DOCD: CPT | Mod: CPTII,S$GLB,, | Performed by: SURGERY

## 2024-09-26 PROCEDURE — 99213 OFFICE O/P EST LOW 20 MIN: CPT | Mod: S$GLB,,, | Performed by: SURGERY

## 2024-09-26 PROCEDURE — 1159F MED LIST DOCD IN RCRD: CPT | Mod: CPTII,S$GLB,, | Performed by: SURGERY

## 2024-09-26 NOTE — Clinical Note
Hey.  FYI.  PT with persistent bleeding after banding. She would like to proceed with diagnostic cscope.  Thanks

## 2024-09-26 NOTE — PROGRESS NOTES
Pleasant 68-year-old female with whom I am familiar.  She was status post rubber-band ligation for hemorrhoids that were causing bleeding and mucus leakage.  This was performed on August 21st.  She notes the problems have persisted since banding.  Notes she feels like she was having more bleeding now.  She was concerned about other potential etiologies.  Her last colonoscopy was 2 years ago.  No other complaints    AFVSS  AAOx3  Soft/nd/nt  No resp distress  Rectal deferred      A/P: Rectal  Bleeding    Discussion with the patient.  Explained to her that she might need repeat banding.  Given that has been 2 years since her last colonoscopy think it was reasonable to proceed with a diagnostic colonoscopy.  Patient notes she would like to have another endoscopy.  We will arrange for this to ensure no other abnormalities were present.  Assuming no other abnormalities were present would proceed with repeat banding versus formal hemorrhoidectomy.  We will arrange for colonoscopy with Gastroenterology in [place]

## 2024-10-01 ENCOUNTER — PATIENT MESSAGE (OUTPATIENT)
Dept: GASTROENTEROLOGY | Facility: CLINIC | Age: 69
End: 2024-10-01
Payer: MEDICARE

## 2024-10-11 ENCOUNTER — HOSPITAL ENCOUNTER (OUTPATIENT)
Dept: RADIOLOGY | Facility: HOSPITAL | Age: 69
Discharge: HOME | End: 2024-10-11
Attending: FAMILY MEDICINE
Payer: MEDICARE

## 2024-10-11 DIAGNOSIS — Z12.31 ENCOUNTER FOR SCREENING MAMMOGRAM FOR MALIGNANT NEOPLASM OF BREAST: ICD-10-CM

## 2024-10-11 PROCEDURE — 77067 SCR MAMMO BI INCL CAD: CPT | Mod: 26,,, | Performed by: RADIOLOGY

## 2024-10-11 PROCEDURE — 77063 BREAST TOMOSYNTHESIS BI: CPT | Mod: 26,,, | Performed by: RADIOLOGY

## 2024-10-11 PROCEDURE — 77063 BREAST TOMOSYNTHESIS BI: CPT | Mod: TC

## 2024-10-11 PROCEDURE — 77067 SCR MAMMO BI INCL CAD: CPT | Mod: TC

## 2024-10-14 ENCOUNTER — PATIENT MESSAGE (OUTPATIENT)
Dept: FAMILY MEDICINE | Facility: CLINIC | Age: 69
End: 2024-10-14
Payer: MEDICARE

## 2024-10-16 ENCOUNTER — LAB VISIT (OUTPATIENT)
Dept: LAB | Facility: HOSPITAL | Age: 69
End: 2024-10-16
Attending: FAMILY MEDICINE
Payer: MEDICARE

## 2024-10-16 DIAGNOSIS — R80.9 MICROALBUMINURIA: ICD-10-CM

## 2024-10-16 LAB
ALBUMIN/CREAT UR: 52.9 UG/MG (ref 0–30)
CREAT UR-MCNC: 17 MG/DL (ref 15–325)
MICROALBUMIN UR DL<=1MG/L-MCNC: 9 UG/ML

## 2024-10-16 PROCEDURE — 82570 ASSAY OF URINE CREATININE: CPT | Performed by: FAMILY MEDICINE

## 2024-10-16 PROCEDURE — 82043 UR ALBUMIN QUANTITATIVE: CPT | Performed by: FAMILY MEDICINE

## 2024-11-20 ENCOUNTER — ANESTHESIA EVENT (OUTPATIENT)
Dept: ENDOSCOPY | Facility: HOSPITAL | Age: 69
End: 2024-11-20
Payer: MEDICARE

## 2024-11-20 ENCOUNTER — HOSPITAL ENCOUNTER (OUTPATIENT)
Facility: HOSPITAL | Age: 69
Discharge: HOME | End: 2024-11-20
Attending: INTERNAL MEDICINE | Admitting: INTERNAL MEDICINE
Payer: MEDICARE

## 2024-11-20 ENCOUNTER — ANESTHESIA (OUTPATIENT)
Dept: ENDOSCOPY | Facility: HOSPITAL | Age: 69
End: 2024-11-20
Payer: MEDICARE

## 2024-11-20 VITALS
TEMPERATURE: 98 F | DIASTOLIC BLOOD PRESSURE: 58 MMHG | SYSTOLIC BLOOD PRESSURE: 148 MMHG | HEART RATE: 72 BPM | RESPIRATION RATE: 18 BRPM | OXYGEN SATURATION: 98 %

## 2024-11-20 DIAGNOSIS — K62.5 RECTAL BLEEDING: ICD-10-CM

## 2024-11-20 PROCEDURE — 37000009 HC ANESTHESIA EA ADD 15 MINS: Performed by: INTERNAL MEDICINE

## 2024-11-20 PROCEDURE — 88305 TISSUE EXAM BY PATHOLOGIST: CPT | Mod: TC | Performed by: PATHOLOGY

## 2024-11-20 PROCEDURE — 27201089 HC SNARE, DISP (ANY): Performed by: INTERNAL MEDICINE

## 2024-11-20 PROCEDURE — 27200997: Performed by: INTERNAL MEDICINE

## 2024-11-20 PROCEDURE — 45385 COLONOSCOPY W/LESION REMOVAL: CPT | Performed by: INTERNAL MEDICINE

## 2024-11-20 PROCEDURE — 37000008 HC ANESTHESIA 1ST 15 MINUTES: Performed by: INTERNAL MEDICINE

## 2024-11-20 PROCEDURE — 45385 COLONOSCOPY W/LESION REMOVAL: CPT | Mod: ,,, | Performed by: INTERNAL MEDICINE

## 2024-11-20 PROCEDURE — 63600175 PHARM REV CODE 636 W HCPCS: Performed by: NURSE ANESTHETIST, CERTIFIED REGISTERED

## 2024-11-20 RX ORDER — PROPOFOL 10 MG/ML
VIAL (ML) INTRAVENOUS
Status: DISCONTINUED | OUTPATIENT
Start: 2024-11-20 | End: 2024-11-20

## 2024-11-20 RX ORDER — LIDOCAINE HYDROCHLORIDE 20 MG/ML
INJECTION INTRAVENOUS
Status: DISCONTINUED | OUTPATIENT
Start: 2024-11-20 | End: 2024-11-20

## 2024-11-20 RX ORDER — SODIUM CHLORIDE 9 MG/ML
INJECTION, SOLUTION INTRAVENOUS CONTINUOUS
Status: DISCONTINUED | OUTPATIENT
Start: 2024-11-20 | End: 2024-11-20 | Stop reason: HOSPADM

## 2024-11-20 RX ADMIN — PROPOFOL 50 MG: 10 INJECTION, EMULSION INTRAVENOUS at 10:11

## 2024-11-20 RX ADMIN — LIDOCAINE HYDROCHLORIDE 100 MG: 20 INJECTION, SOLUTION INTRAVENOUS at 10:11

## 2024-11-20 RX ADMIN — PROPOFOL 100 MG: 10 INJECTION, EMULSION INTRAVENOUS at 10:11

## 2024-11-20 NOTE — TRANSFER OF CARE
Anesthesia Transfer of Care Note    Patient: Polina Griffin    Procedure(s) Performed: Procedure(s) (LRB):  COLONOSCOPY (N/A)    Patient location: GI    Anesthesia Type: general    Transport from OR: Transported from OR on room air with adequate spontaneous ventilation    Post pain: adequate analgesia    Post assessment: no apparent anesthetic complications    Post vital signs: stable    Level of consciousness: awake    Nausea/Vomiting: no nausea/vomiting    Complications: none    Transfer of care protocol was followed      Last vitals: Visit Vitals  BP (!) 169/75 (BP Location: Left arm, Patient Position: Lying)   Pulse 80   Temp 36.3 °C (97.4 °F) (Skin)   Resp 16   LMP  (LMP Unknown)   SpO2 97%   Breastfeeding No

## 2024-11-20 NOTE — ANESTHESIA POSTPROCEDURE EVALUATION
Anesthesia Post Evaluation    Patient: Polina Griffin    Procedure(s) Performed: Procedure(s) (LRB):  COLONOSCOPY (N/A)    Final Anesthesia Type: general      Patient location during evaluation: PACU  Patient participation: Yes- Able to Participate  Level of consciousness: awake and alert  Post-procedure vital signs: reviewed and stable  Pain management: adequate  Airway patency: patent    PONV status at discharge: No PONV  Anesthetic complications: no      Cardiovascular status: blood pressure returned to baseline  Respiratory status: unassisted and room air  Hydration status: euvolemic  Follow-up not needed.              Vitals Value Taken Time   /58 11/20/24 1115   Temp 36.6 °C (97.9 °F) 11/20/24 1100   Pulse 72 11/20/24 1115   Resp 18 11/20/24 1115   SpO2 98 % 11/20/24 1115         Event Time   Out of Recovery 11:26:12         Pain/Stephanie Score: Stephanie Score: 10 (11/20/2024 11:15 AM)

## 2024-11-20 NOTE — ANESTHESIA PREPROCEDURE EVALUATION
11/20/2024  Polina Griffin is a 68 y.o., female.      Pre-op Assessment          Review of Systems  Cardiovascular:     Hypertension                                    Hypertension         Hepatic/GI:     GERD         Gerd          Neurological:      Headaches      Dx of Headaches                               Physical Exam  General: Well nourished        Anesthesia Plan  Type of Anesthesia, risks & benefits discussed:    Anesthesia Type: Gen Natural Airway  Intra-op Monitoring Plan: Standard ASA Monitors  Induction:  IV  Informed Consent: Informed consent signed with the Patient and all parties understand the risks and agree with anesthesia plan.  All questions answered.   ASA Score: 2  Day of Surgery Review of History & Physical: H&P Update referred to the surgeon/provider.    Ready For Surgery From Anesthesia Perspective.     .

## 2024-11-20 NOTE — PLAN OF CARE
Patient awake, alert, and oriented.  No complaints of pain; abdomen soft and tender.  Patient passing flatus without difficulty.  Vital signs stable.  Patient tolerated po fluids well.  Dr. Armenta spoke with patient and family member prior to discharge.  Patient and family verbalize understanding of all discharge instructions given.  Patient discharged to home accompanied by family

## 2024-11-20 NOTE — [employer]ATION PATIENT INSTRUCTIONS
Discharge Summary/Instructions after an Endoscopic Procedure  Patient Name: Polina Griffin  Patient MRN: [id number]  Patient YOB: 1955 Wednesday, November 20, 2024  Elle Armenta MD  Dear patient,  As a result of recent federal legislation (The Federal Cures Act), you may   receive lab or pathology results from your procedure in your [facility]   account before your physician is able to contact you. Your physician or   their representative will relay the results to you with their   recommendations at their soonest availability.  Thank you,  RESTRICTIONS:  During your procedure today, you received medications for sedation.  These   medications may affect your judgment, balance and coordination.  Therefore,   for 24 hours, you have the following restrictions:   - DO NOT drive a car, operate machinery, make legal/financial decisions,   sign important papers or drink alcohol.    ACTIVITY:  Today: no heavy lifting, straining or running due to procedural   sedation/anesthesia.  The following day: return to full activity including work.  DIET:  Eat and drink normally unless instructed otherwise.     TREATMENT FOR COMMON SIDE EFFECTS:  - Mild abdominal pain, nausea, belching, bloating or excessive gas:  rest,   eat lightly and use a heating pad.  - Sore Throat: treat with throat lozenges and/or gargle with warm salt   water.  - Because air was used during the procedure, expelling large amounts of air   from your rectum or belching is normal.  - If a bowel prep was taken, you may not have a bowel movement for 1-3 days.    This is normal.  SYMPTOMS TO WATCH FOR AND REPORT TO YOUR PHYSICIAN:  1. Abdominal pain or bloating, other than gas cramps.  2. Chest pain.  3. Back pain.  4. Signs of infection such as: chills or fever occurring within 24 hours   after the procedure.  5. Rectal bleeding, which would show as bright red, maroon, or black stools.   (A tablespoon of blood from the rectum is not  serious, especially if   hemorrhoids are present.)  6. Vomiting.  7. Weakness or dizziness.  GO DIRECTLY TO THE NEAREST EMERGENCY ROOM IF YOU HAVE ANY OF THE FOLLOWING:      Difficulty breathing              Chills and/or fever over 101 F   Persistent vomiting and/or vomiting blood   Severe abdominal pain   Severe chest pain   Black, tarry stools   Bleeding- more than one tablespoon   Any other symptom or condition that you feel may need urgent attention  Your doctor recommends these additional instructions:  If any biopsies were taken, your doctors clinic will contact you in 1 to 2   weeks with any results.  - Discharge patient to home (with escort).   - Patient has a contact number available for emergencies.  The signs and   symptoms of potential delayed complications were discussed with the   patient.  Return to normal activities tomorrow.  Written discharge   instructions were provided to the patient.   - Resume previous diet.   - Continue present medications.   - Repeat colonoscopy in 3 years for surveillance.   - Return to my office PRN.  For questions, problems or results please call your physician - Elle Armenta MD at Work:  [phone].  [facility], EMERGENCY ROOM PHONE NUMBER: [phone]  IF A COMPLICATION OR EMERGENCY SITUATION ARISES AND YOU ARE UNABLE TO REACH   YOUR PHYSICIAN - GO DIRECTLY TO THE EMERGENCY ROOM.  Elle Armenta MD  11/20/2024 10:57:28 AM  This report has been verified and signed electronically.  Dear patient,  As a result of recent federal legislation (The Federal Cures Act), you may   receive lab or pathology results from your procedure in your [facility]   account before your physician is able to contact you. Your physician or   their representative will relay the results to you with their   recommendations at their soonest availability.  Thank you,  [employer]ATION

## 2024-12-04 DIAGNOSIS — K58.9 IRRITABLE BOWEL SYNDROME, UNSPECIFIED TYPE: Primary | ICD-10-CM

## 2024-12-05 ENCOUNTER — PATIENT MESSAGE (OUTPATIENT)
Dept: SURGERY | Facility: CLINIC | Age: 69
End: 2024-12-05
Payer: MEDICARE

## 2024-12-05 RX ORDER — DICYCLOMINE HYDROCHLORIDE 10 MG/1
10 CAPSULE ORAL 2 TIMES DAILY
Qty: 180 CAPSULE | Refills: 0 | Status: SHIPPED | OUTPATIENT
Start: 2024-12-05 | End: 2025-03-05

## 2025-01-06 ENCOUNTER — LAB VISIT (OUTPATIENT)
Dept: LAB | Facility: HOSPITAL | Age: 70
End: 2025-01-06
Attending: FAMILY MEDICINE
Payer: MEDICARE

## 2025-01-06 ENCOUNTER — E-VISIT (OUTPATIENT)
Dept: FAMILY MEDICINE | Facility: CLINIC | Age: 70
End: 2025-01-06
Payer: MEDICARE

## 2025-01-06 DIAGNOSIS — R31.0 GROSS HEMATURIA: Primary | ICD-10-CM

## 2025-01-06 DIAGNOSIS — R31.0 GROSS HEMATURIA: ICD-10-CM

## 2025-01-06 DIAGNOSIS — R21 RASH: ICD-10-CM

## 2025-01-06 DIAGNOSIS — R80.9 MICROALBUMINURIA: ICD-10-CM

## 2025-01-06 LAB
ALBUMIN SERPL BCP-MCNC: 3.7 G/DL (ref 3.5–5.2)
ALP SERPL-CCNC: 65 U/L (ref 40–150)
ALT SERPL W/O P-5'-P-CCNC: 11 U/L (ref 10–44)
ANION GAP SERPL CALC-SCNC: 6 MMOL/L (ref 8–16)
AST SERPL-CCNC: 21 U/L (ref 10–40)
BILIRUB SERPL-MCNC: 0.3 MG/DL (ref 0.1–1)
BILIRUB UR QL STRIP: NEGATIVE
BUN SERPL-MCNC: 16 MG/DL (ref 8–23)
CALCIUM SERPL-MCNC: 8.8 MG/DL (ref 8.7–10.5)
CHLORIDE SERPL-SCNC: 108 MMOL/L (ref 95–110)
CLARITY UR: CLEAR
CO2 SERPL-SCNC: 27 MMOL/L (ref 23–29)
COLOR UR: YELLOW
CREAT SERPL-MCNC: 0.7 MG/DL (ref 0.5–1.4)
EST. GFR  (NO RACE VARIABLE): >60 ML/MIN/1.73 M^2
GLUCOSE SERPL-MCNC: 104 MG/DL (ref 70–110)
GLUCOSE UR QL STRIP: NEGATIVE
HGB UR QL STRIP: NEGATIVE
KETONES UR QL STRIP: NEGATIVE
LEUKOCYTE ESTERASE UR QL STRIP: NEGATIVE
NITRITE UR QL STRIP: NEGATIVE
PH UR STRIP: 7 [PH] (ref 5–8)
POTASSIUM SERPL-SCNC: 4.1 MMOL/L (ref 3.5–5.1)
PROT SERPL-MCNC: 6.7 G/DL (ref 6–8.4)
PROT UR QL STRIP: ABNORMAL
SODIUM SERPL-SCNC: 141 MMOL/L (ref 136–145)
SP GR UR STRIP: 1.02 (ref 1–1.03)
URN SPEC COLLECT METH UR: ABNORMAL
UROBILINOGEN UR STRIP-ACNC: NEGATIVE EU/DL

## 2025-01-06 PROCEDURE — 36415 COLL VENOUS BLD VENIPUNCTURE: CPT | Performed by: FAMILY MEDICINE

## 2025-01-06 PROCEDURE — 80053 COMPREHEN METABOLIC PANEL: CPT | Performed by: FAMILY MEDICINE

## 2025-01-06 PROCEDURE — 81003 URINALYSIS AUTO W/O SCOPE: CPT | Performed by: FAMILY MEDICINE

## 2025-01-06 NOTE — PROGRESS NOTES
Subjective:       Patient ID: Polina Griffin is a 69 y.o. female.    Chief Complaint: General Illness (Entered automatically based on patient selection in [employer].)      Past Medical History:   Diagnosis Date    Abnormal Pap smear of cervix 2011, 2013    LEEP    Anemia     in her 20's    Esophageal reflux     Fibroids     Fibromatosis of plantar fascia     Insomnia     Irritable bowel syndrome (IBS)     Ocular migraine     Tinnitus     Urge incontinence        Past Surgical History:   Procedure Laterality Date    APPENDECTOMY  2008    CATARACT EXTRACTION Bilateral 2021    CERVICAL BIOPSY  W/ LOOP ELECTRODE EXCISION  2011    COLONOSCOPY N/A 12/11/2017    Procedure: COLONOSCOPY;  Surgeon: Angel Florez MD;  Location: [facility] ENDO ([address] [place] [address]);  Service: Endoscopy;  Laterality: N/A;    COLONOSCOPY N/A 12/7/2022    Procedure: COLONOSCOPY;  Surgeon: Elle Armenta MD;  Location: [facility] ENDO;  Service: Endoscopy;  Laterality: N/A;    COLONOSCOPY N/A 11/20/2024    Procedure: COLONOSCOPY;  Surgeon: Elle Armenta MD;  Location: [facility] ENDO;  Service: Endoscopy;  Laterality: N/A;  aly/rectal bleeding    ENDOMETRIAL BIOPSY  2010    NASAL TURBINATE REDUCTION  2015    SUPERFICIAL KERATECTOMY Right 07/2021    TUBAL LIGATION Bilateral 1980    UPPER GASTROINTESTINAL ENDOSCOPY      WISDOM TOOTH EXTRACTION Bilateral 1973        Social History     Socioeconomic History    Marital status:    Tobacco Use    Smoking status: Never    Smokeless tobacco: Never   Substance and Sexual Activity    Alcohol use: No    Drug use: No    Sexual activity: Not Currently     Partners: Male     Birth control/protection: None, Post-menopausal     Comment: :      Social Drivers of Health     Financial Resource Strain: Low Risk  (2/1/2024)    Overall Financial Resource Strain (CARDIA)     Difficulty of Paying Living Expenses: Not very hard   Food Insecurity: No Food Insecurity (2/1/2024)    Hunger Vital Sign     Worried About Running Out  of Food in the Last Year: Never true     Ran Out of Food in the Last Year: Never true   Transportation Needs: No Transportation Needs (2/1/2024)    PRAPARE - Transportation     Lack of Transportation (Medical): No     Lack of Transportation (Non-Medical): No   Physical Activity: Sufficiently Active (2/1/2024)    Exercise Vital Sign     Days of Exercise per Week: 5 days     Minutes of Exercise per Session: 60 min   Stress: No Stress Concern Present (2/1/2024)    [employer] - Occupational Stress Questionnaire     Feeling of Stress : Only a little   Housing Stability: Low Risk  (2/1/2024)    Housing Stability Vital Sign     Unable to Pay for Housing in the Last Year: No     Number of Places Lived in the Last Year: 1     Unstable Housing in the Last Year: No       Family History   Problem Relation Name Age of Onset    COPD Mother      Hyperlipidemia Mother      Hypertension Mother      Coronary artery disease Mother      Stroke Father      Hypertension Father      Coronary artery disease Father      Heart attack Father      Hyperlipidemia Father      Cancer Father      Prostate cancer Father      Diabetes Brother      Ovarian cancer Neg Hx      Colon cancer Neg Hx      Breast cancer Neg Hx      Crohn's disease Neg Hx      Ulcerative colitis Neg Hx      Stomach cancer Neg Hx      Esophageal cancer Neg Hx      Irritable bowel syndrome Neg Hx      Celiac disease Neg Hx      Rectal cancer Neg Hx      Colon polyps Neg Hx         Review of patient's allergies indicates:   Allergen Reactions    Amoxicillin Rash          Current Outpatient Medications:     BENEFIBER, GUAR GUM, ORAL, Take by mouth once daily. 3 Gummies a day, Disp: , Rfl:     CALCIUM PHOSPHATE TRIB/VIT D3 (CITRACAL + D3, CALCIUM PHOS, ORAL), Take by mouth., Disp: , Rfl:     coenzyme Q10 100 mg capsule, Take 100 mg by mouth once daily., Disp: , Rfl:     diclofenac sodium (VOLTAREN) 1 % Gel, Apply 2 g topically once daily., Disp: 450  g, Rfl: 5    dicyclomine (BENTYL) 10 MG capsule, Take 1 capsule (10 mg total) by mouth 2 (two) times daily., Disp: 180 capsule, Rfl: 0    DIINDOLYLMETHANE, BULK, MISC, , Disp: , Rfl:     fluticasone (FLONASE) 50 mcg/actuation nasal spray, 1 spray by Each Nostril route once daily., Disp: , Rfl:     multivitamin capsule, Take 1 capsule by mouth once daily., Disp: , Rfl:     vit C,S-Yr-tkuyr-lutein-zeaxan (PRESERVISION AREDS 2) 250-90-40-1 mg Cap, Take 1 tablet by mouth once daily., Disp: , Rfl:     Ms. Polina Griffin presents today by E visit for urinary symptoms.  She states that she has gross hematuria in the morning urine and aches in her lower back but no pain.  She also has an unexplained rash/itchy spot on her neck.  She states that she has tried to address the urinary issue by drinking more water.  She also has an urgent need to urinate.  She complains of flank pain and gross hematuria.  She does not have a fever.  She says this urinary issue has caused her mild discomfort in the past 24 hours.    A urinalysis with reflex to culture has been ordered.  If this patient has concerns for the rash stated on her neck, she will need to make an appointment for visual observation of this rash is there are no photos of the rash available in her chart.  Antibiotics have been sent for urinary tract infection, however the urinalysis should be done prior to starting antibiotics to get a more accurate culture.    Medication allergies have been reviewed    Illness  Associated symptoms include a rash.     Review of Systems   Genitourinary:  Positive for dysuria, frequency, hematuria and urgency.   Skin:  Positive for rash.        States itchy rash on neck       Objective:      Physical Exam  Genitourinary:     Comments: Patient states dysuria, frequency, gross hematuria and some flank pain (does not state which side the flank pain is on)  Skin:     Findings: Rash present.      Comments: States rash on neck, unable to visualize  is no photos were provided in this E chart visit         Assessment:       1. Gross hematuria    2. Rash        Plan:         Gross hematuria  Comments:  Urinalysis with reflex to culture.  If urinalysis shows and infectious process, antibiotics will be sent to the pharmacy  Orders:  -     Urinalysis, Reflex to Urine Culture; Future; Expected date: 01/06/2025    Rash  Comments:  Patient states rash on neck, no photo provided.  We will need to make an in person office visit to evaluate this rash    15 minutes was utilized in reviewing this patient's E visit questionnaire, last office visit, last labs, allergies, and in documenting this E visit note    Risks, benefits, and side effects were discussed with the patient. All questions were answered to the fullest satisfaction of the patient, and pt verbalized understanding and agreement to treatment plan. Pt was to call with any new or worsening symptoms, or present to the ER.        Marisol Law MD

## 2025-01-09 ENCOUNTER — PATIENT MESSAGE (OUTPATIENT)
Dept: FAMILY MEDICINE | Facility: CLINIC | Age: 70
End: 2025-01-09
Payer: MEDICARE

## 2025-03-21 ENCOUNTER — PATIENT MESSAGE (OUTPATIENT)
Dept: GASTROENTEROLOGY | Facility: CLINIC | Age: 70
End: 2025-03-21
Payer: MEDICARE

## 2025-03-21 DIAGNOSIS — Z87.19 HISTORY OF IBS: Primary | ICD-10-CM

## 2025-03-21 DIAGNOSIS — R10.9 ABDOMINAL CRAMPING: ICD-10-CM

## 2025-03-21 RX ORDER — DICYCLOMINE HYDROCHLORIDE 10 MG/1
10 CAPSULE ORAL 4 TIMES DAILY PRN
Qty: 120 CAPSULE | Refills: 0 | Status: SHIPPED | OUTPATIENT
Start: 2025-03-21 | End: 2025-04-20

## 2025-03-21 NOTE — TELEPHONE ENCOUNTER
Please let the patient know I sent in a 30 day refill. Schedule patient annual office visit to receive future refills.  Sincerely,  Melinda Bryant NP

## 2025-06-17 ENCOUNTER — HOSPITAL ENCOUNTER (EMERGENCY)
Facility: HOSPITAL | Age: 70
Discharge: ANOTHER HOSPITAL | End: 2025-06-17
Attending: EMERGENCY MEDICINE
Payer: MEDICARE

## 2025-06-17 VITALS
OXYGEN SATURATION: 97 % | WEIGHT: 158 LBS | HEART RATE: 74 BPM | HEIGHT: 66 IN | DIASTOLIC BLOOD PRESSURE: 74 MMHG | SYSTOLIC BLOOD PRESSURE: 168 MMHG | BODY MASS INDEX: 25.39 KG/M2 | TEMPERATURE: 99 F | RESPIRATION RATE: 20 BRPM

## 2025-06-17 DIAGNOSIS — N99.820 POSTOPERATIVE VAGINAL BLEEDING FOLLOWING GENITOURINARY PROCEDURE: Primary | ICD-10-CM

## 2025-06-17 LAB
ABSOLUTE EOSINOPHIL (OHS): 0.06 K/UL
ABSOLUTE MONOCYTE (OHS): 0.39 K/UL (ref 0.3–1)
ABSOLUTE NEUTROPHIL COUNT (OHS): 4.69 K/UL (ref 1.8–7.7)
ALBUMIN SERPL BCP-MCNC: 3.6 G/DL (ref 3.5–5.2)
ALP SERPL-CCNC: 64 UNIT/L (ref 40–150)
ALT SERPL W/O P-5'-P-CCNC: 10 UNIT/L (ref 10–44)
ANION GAP (OHS): 10 MMOL/L (ref 8–16)
APTT PPP: 29.3 SECONDS (ref 21–32)
AST SERPL-CCNC: 17 UNIT/L (ref 11–45)
BASOPHILS # BLD AUTO: 0.02 K/UL
BASOPHILS NFR BLD AUTO: 0.3 %
BILIRUB SERPL-MCNC: 0.4 MG/DL (ref 0.1–1)
BUN SERPL-MCNC: 13 MG/DL (ref 8–23)
CALCIUM SERPL-MCNC: 8.6 MG/DL (ref 8.7–10.5)
CHLORIDE SERPL-SCNC: 106 MMOL/L (ref 95–110)
CO2 SERPL-SCNC: 24 MMOL/L (ref 23–29)
CREAT SERPL-MCNC: 0.7 MG/DL (ref 0.5–1.4)
ERYTHROCYTE [DISTWIDTH] IN BLOOD BY AUTOMATED COUNT: 13.2 % (ref 11.5–14.5)
GFR SERPLBLD CREATININE-BSD FMLA CKD-EPI: >60 ML/MIN/1.73/M2
GLUCOSE SERPL-MCNC: 136 MG/DL (ref 70–110)
HCT VFR BLD AUTO: 35.6 % (ref 37–48.5)
HGB BLD-MCNC: 11.4 GM/DL (ref 12–16)
HOLD SPECIMEN: NORMAL
IMM GRANULOCYTES # BLD AUTO: 0.02 K/UL (ref 0–0.04)
IMM GRANULOCYTES NFR BLD AUTO: 0.3 % (ref 0–0.5)
INDIRECT COOMBS: NORMAL
INR PPP: 1 (ref 0.8–1.2)
LYMPHOCYTES # BLD AUTO: 1.96 K/UL (ref 1–4.8)
MCH RBC QN AUTO: 27.4 PG (ref 27–31)
MCHC RBC AUTO-ENTMCNC: 32 G/DL (ref 32–36)
MCV RBC AUTO: 86 FL (ref 82–98)
NUCLEATED RBC (/100WBC) (OHS): 0 /100 WBC
PLATELET # BLD AUTO: 218 K/UL (ref 150–450)
PMV BLD AUTO: 10 FL (ref 9.2–12.9)
POTASSIUM SERPL-SCNC: 3.5 MMOL/L (ref 3.5–5.1)
PROT SERPL-MCNC: 6.5 GM/DL (ref 6–8.4)
PROTHROMBIN TIME: 10.8 SECONDS (ref 9–12.5)
RBC # BLD AUTO: 4.16 M/UL (ref 4–5.4)
RELATIVE EOSINOPHIL (OHS): 0.8 %
RELATIVE LYMPHOCYTE (OHS): 27.5 % (ref 18–48)
RELATIVE MONOCYTE (OHS): 5.5 % (ref 4–15)
RELATIVE NEUTROPHIL (OHS): 65.6 % (ref 38–73)
RH BLD: NORMAL
SODIUM SERPL-SCNC: 140 MMOL/L (ref 136–145)
SPECIMEN OUTDATE: NORMAL
WBC # BLD AUTO: 7.14 K/UL (ref 3.9–12.7)

## 2025-06-17 PROCEDURE — 76856 US EXAM PELVIC COMPLETE: CPT | Mod: TC

## 2025-06-17 PROCEDURE — 85025 COMPLETE CBC W/AUTO DIFF WBC: CPT | Performed by: NURSE PRACTITIONER

## 2025-06-17 PROCEDURE — 25000003 PHARM REV CODE 250: Performed by: NURSE PRACTITIONER

## 2025-06-17 PROCEDURE — 36415 COLL VENOUS BLD VENIPUNCTURE: CPT | Performed by: EMERGENCY MEDICINE

## 2025-06-17 PROCEDURE — 85610 PROTHROMBIN TIME: CPT | Performed by: NURSE PRACTITIONER

## 2025-06-17 PROCEDURE — 99285 EMERGENCY DEPT VISIT HI MDM: CPT | Mod: 25

## 2025-06-17 PROCEDURE — 85730 THROMBOPLASTIN TIME PARTIAL: CPT | Performed by: NURSE PRACTITIONER

## 2025-06-17 PROCEDURE — 63600175 PHARM REV CODE 636 W HCPCS: Performed by: NURSE PRACTITIONER

## 2025-06-17 PROCEDURE — 86900 BLOOD TYPING SEROLOGIC ABO: CPT | Performed by: NURSE PRACTITIONER

## 2025-06-17 PROCEDURE — 80053 COMPREHEN METABOLIC PANEL: CPT | Performed by: NURSE PRACTITIONER

## 2025-06-17 RX ORDER — ACETAMINOPHEN 325 MG/1
650 TABLET ORAL
Status: COMPLETED | OUTPATIENT
Start: 2025-06-17 | End: 2025-06-17

## 2025-06-17 RX ADMIN — ACETAMINOPHEN 650 MG: 325 TABLET ORAL at 06:06

## 2025-06-17 RX ADMIN — SODIUM CHLORIDE, POTASSIUM CHLORIDE, SODIUM LACTATE AND CALCIUM CHLORIDE 1000 ML: 600; 310; 30; 20 INJECTION, SOLUTION INTRAVENOUS at 06:06

## 2025-06-17 NOTE — ED NOTES
Report given to [facility] ED, Lucy PENA, Requested vitals, H&H and ultrasound reading. Advised pt has not left yet.

## 2025-06-17 NOTE — ED PROVIDER NOTES
"Encounter Date: 6/17/2025       History     Chief Complaint   Patient presents with    Post-op Problem     Pt. States she had a D and C done last Tuesday and began to have bright red vaginal bleeding approx. 1 hour PTA. States she went through 3 pads in approx. 30 min. States her OB told her to go to [facility] where the surgery was done.     Patient presents to ED for chief complaint of vaginal bleeding.  She reports 1 week ago she had surgical procedure with Dr. Manjarrez for treatment of postmenopausal vaginal bleeding, fibroids, and thickened endometrium (D&C and myomectomy).  She reports she has had some routine/usual postoperative spotting but today she had increased vaginal bleeding described as dark old blood and some occasional bright blood as well "felt a gush of blood and bleeding since".  Describes the blood as bright red with some clots.  She reports she has changed 3 pads prior to arrival.  She reports she has had an uneventful postoperative course.  No fever.  She denies any abdominal pain other than some mild light cramping.  No current pain needs.  She reports only thing she did different today was she did exercise for 30 minutes on her stationary bike without resistance.  She reports voiding without issue.  She reports regular bowel movement initially was experiencing some constipation but resolved with stool softener.  Tells me she has not had follow up regarding pathology but did look on her patient portal and there was no evidence of cancer/malignancy.  She did not require blood transfusion.    The history is provided by the patient.     Review of patient's allergies indicates:   Allergen Reactions    Amoxicillin Rash     Past Medical History:   Diagnosis Date    Abnormal Pap smear of cervix 2011, 2013    LEEP    Anemia     in her 20's    Esophageal reflux     Fibroids     Fibromatosis of plantar fascia     Insomnia     Irritable bowel syndrome (IBS)     Ocular migraine     Tinnitus     " Urge incontinence      Past Surgical History:   Procedure Laterality Date    APPENDECTOMY  2008    CATARACT EXTRACTION Bilateral 2021    CERVICAL BIOPSY  W/ LOOP ELECTRODE EXCISION  2011    COLONOSCOPY N/A 12/11/2017    Procedure: COLONOSCOPY;  Surgeon: Angel Florez MD;  Location: [facility] ENDO ([address] [place] [address]);  Service: Endoscopy;  Laterality: N/A;    COLONOSCOPY N/A 12/7/2022    Procedure: COLONOSCOPY;  Surgeon: Elle Armenta MD;  Location: [facility] ENDO;  Service: Endoscopy;  Laterality: N/A;    COLONOSCOPY N/A 11/20/2024    Procedure: COLONOSCOPY;  Surgeon: Elle Armenta MD;  Location: [facility] ENDO;  Service: Endoscopy;  Laterality: N/A;  aly/rectal bleeding    ENDOMETRIAL BIOPSY  2010    NASAL TURBINATE REDUCTION  2015    SUPERFICIAL KERATECTOMY Right 07/2021    TUBAL LIGATION Bilateral 1980    UPPER GASTROINTESTINAL ENDOSCOPY      WISDOM TOOTH EXTRACTION Bilateral 1973     Family History   Problem Relation Name Age of Onset    COPD Mother      Hyperlipidemia Mother      Hypertension Mother      Coronary artery disease Mother      Stroke Father      Hypertension Father      Coronary artery disease Father      Heart attack Father      Hyperlipidemia Father      Cancer Father      Prostate cancer Father      Diabetes Brother      Ovarian cancer Neg Hx      Colon cancer Neg Hx      Breast cancer Neg Hx      Crohn's disease Neg Hx      Ulcerative colitis Neg Hx      Stomach cancer Neg Hx      Esophageal cancer Neg Hx      Irritable bowel syndrome Neg Hx      Celiac disease Neg Hx      Rectal cancer Neg Hx      Colon polyps Neg Hx       Social History[1]  Review of Systems   Constitutional: Negative.    Respiratory: Negative.     Cardiovascular: Negative.    Gastrointestinal:  Positive for constipation. Negative for abdominal distention, abdominal pain, diarrhea, nausea and vomiting.   Genitourinary:  Positive for vaginal bleeding. Negative for dysuria and pelvic pain.   Musculoskeletal: Negative.    Skin:  Negative.    Neurological: Negative.        Physical Exam     Initial Vitals [06/17/25 1454]   BP Pulse Resp Temp SpO2   (!) 173/84 101 17 98.6 °F (37 °C) 96 %      MAP       --         Physical Exam.  Nursing Notes and Vital Signs reviewed.    Constitutional: Nontoxic appearing patient in no acute distress.   Head: Normocephalic. Atraumatic.   Eyes:  Conjunctivae are not pale. No scleral icterus.   ENT: Mucous membranes moist.   Neck: Supple.   Cardiovascular: Regular rate and rhythm.   Pulmonary: Breath sounds clear thought  No respiratory distress.   Abdominal: Soft, Non-distended, non tender, bowel sounds present  Musculoskeletal: Moves all extremities. No obvious deformities.   Skin: Warm and dry.   Neurological:  Alert, awake, and appropriate.   Psychiatric: Normal affect.    ED Course   Procedures  Labs Reviewed   COMPREHENSIVE METABOLIC PANEL - Abnormal       Result Value    Sodium 140      Potassium 3.5      Chloride 106      CO2 24      Glucose 136 (*)     BUN 13      Creatinine 0.7      Calcium 8.6 (*)     Protein Total 6.5      Albumin 3.6      Bilirubin Total 0.4      ALP 64      AST 17      ALT 10      Anion Gap 10      eGFR >60     CBC WITH DIFFERENTIAL - Abnormal    WBC 7.14      RBC 4.16      HGB 11.4 (*)     HCT 35.6 (*)     MCV 86      MCH 27.4      MCHC 32.0      RDW 13.2      Platelet Count 218      MPV 10.0      Nucleated RBC 0      Neut % 65.6      Lymph % 27.5      Mono % 5.5      Eos % 0.8      Basophil % 0.3      Imm Grans % 0.3      Neut # 4.69      Lymph # 1.96      Mono # 0.39      Eos # 0.06      Baso # 0.02      Imm Grans # 0.02     PROTIME-INR - Normal    PT 10.8      INR 1.0     APTT - Normal    PTT 29.3     CBC W/ AUTO DIFFERENTIAL    Narrative:     The following orders were created for panel order CBC auto differential.  Procedure                               Abnormality         Status                     ---------                               -----------         ------                      CBC with Differential[[id number]]       Abnormal            Final result                 Please view results for these tests on the individual orders.   EXTRA TUBES    Narrative:     The following orders were created for panel order EXTRA TUBES.  Procedure                               Abnormality         Status                     ---------                               -----------         ------                     Red Top Hold[[id number]]                                    Final result               Red Top Hold[[id number]]                                    Final result               Lavender Top Hold[[id number]]                               Final result               Lavender Top Hold[[id number]]                               Final result                 Please view results for these tests on the individual orders.   RED TOP HOLD    Extra Tube Hold for add-ons.     RED TOP HOLD    Extra Tube Hold for add-ons.     LAVENDER TOP HOLD    Extra Tube Hold for add-ons.     LAVENDER TOP HOLD    Extra Tube Hold for add-ons.     TYPE & SCREEN    Specimen Outdate 06/20/2025 23:59      Group & Rh O POS      Indirect Armando NEG     ABORH RETYPE          Imaging Results              US Pelvis Complete Non OB (Final result)  Result time 06/17/25 15:57:22      Final result by Arcenio Brush MD (06/17/25 15:57:22)                   Impression:      1. Thickened heterogeneous hyperechogenic likely representing hemorrhage in this patient status post recent D and C.  2. Nonvisualization of the ovaries.      Electronically signed by: Arcenio Brush  Date:    06/17/2025  Time:    15:57               Narrative:    EXAMINATION:  US PELVIS COMPLETE NON OB    CLINICAL HISTORY:  vaginal bleeding post op;    TECHNIQUE:  Transabdominal sonography of the pelvis was performed, followed by transvaginal sonography to better evaluate the uterus and ovaries.    COMPARISON:  None.    FINDINGS:  Uterus measures 7.3 x 5.0 x 5.3 cm.   Endometrial complex is thickened measuring 8 mm with heterogeneous hyperechogenicity likely representing hemorrhage.    The ovaries are obscured by overlying bowel gas.  No suspicious adnexal mass.  No free fluid in cul-de-sac.                                       Medications   acetaminophen tablet 650 mg (650 mg Oral Given 6/17/25 1840)   lactated ringers bolus 1,000 mL (1,000 mLs Intravenous New Bag 6/17/25 1843)     Medical Decision Making  Amount and/or Complexity of Data Reviewed  Labs: ordered.  Radiology: ordered.    Risk  OTC drugs.               ED Course as of 06/18/25 0620   Tue Jun 17, 2025   1637 Patient reports up to use bathroom still with vaginal bleeding bright red blood and passing some clots.  She reports it is not quite as heavy but still present.  Updated on CBC and ultrasound results and plan for pelvic [AT]   1835 Updated on plan of care/transfer.  Patient recently had skin cancer taken from her right hand she is inquiring as to have some Tylenol.  Additionally she reports mouth dryness/thirsty but otherwise no acute complaints.  We will be transferring to [facility] where her OB gyn surgeon performing recent procedure is.  Patient denies any additional needs or questions. [AT]   1851 Attending Attestation    I, Dr. Oconnor, personally made/approved the management plan for this patient and take responsibility for the patient's management.     I reviewed the APC's documentation and agree with their assessment. I had face-to-face time with the patient.    Polina Griffin is a 69 y.o. female who presents with vaginal bleeding after recent myomectomy with Dr. Manjarrez at outside facility.  On my pelvic exam, she had dried red blood all over both labia and some scant clotted blood in the vaginal canal.     US shows active bleeding.     The plan is transfer for Gyn evaluation at [facility] where her Gyn has privileges.      Sidney Oconnor MD  [ND]      ED Course User Index  [AT] Radha Lowery.,  NP  [ND] Sidney Oconnor MD                           Clinical Impression:  Final diagnoses:  [N99.820] Postoperative vaginal bleeding following genitourinary procedure (Primary)          ED Disposition Condition    Transfer to Another Facility Stable             Attending Physician Attestation    See ED course above for my attestation note.     MD Sebastián Grey Amber L. C., NP  06/17/25 1837       [1]   Social History  Tobacco Use    Smoking status: Never    Smokeless tobacco: Never   Substance Use Topics    Alcohol use: No    Drug use: No        Sidney Oconnor MD  06/18/25 0656

## 2025-06-20 LAB — ABORH RETYPE: NORMAL

## 2025-06-23 ENCOUNTER — PATIENT OUTREACH (OUTPATIENT)
Dept: ADMINISTRATIVE | Facility: HOSPITAL | Age: 70
End: 2025-06-23
Payer: MEDICARE

## 2025-07-03 ENCOUNTER — HOSPITAL ENCOUNTER (OUTPATIENT)
Dept: RADIOLOGY | Facility: HOSPITAL | Age: 70
Discharge: HOME | End: 2025-07-03
Attending: PHYSICIAN ASSISTANT
Payer: MEDICARE

## 2025-07-03 DIAGNOSIS — Z98.890 S/P MYOMECTOMY: ICD-10-CM

## 2025-07-03 DIAGNOSIS — Z09 POSTOP CHECK: ICD-10-CM

## 2025-07-03 DIAGNOSIS — N93.9 ABNORMAL UTERINE BLEEDING: ICD-10-CM

## 2025-07-03 DIAGNOSIS — Z98.890 S/P DILATION AND CURETTAGE: ICD-10-CM

## 2025-07-03 PROCEDURE — 74177 CT ABD & PELVIS W/CONTRAST: CPT | Mod: 26,,, | Performed by: RADIOLOGY

## 2025-07-03 PROCEDURE — 74177 CT ABD & PELVIS W/CONTRAST: CPT | Mod: TC

## 2025-07-03 PROCEDURE — 25500020 PHARM REV CODE 255: Performed by: PHYSICIAN ASSISTANT

## 2025-07-03 PROCEDURE — A9698 NON-RAD CONTRAST MATERIALNOC: HCPCS | Performed by: PHYSICIAN ASSISTANT

## 2025-07-03 RX ADMIN — IOHEXOL 1000 ML: 9 SOLUTION ORAL at 12:07

## 2025-07-03 RX ADMIN — IOHEXOL 75 ML: 350 INJECTION, SOLUTION INTRAVENOUS at 12:07

## 2025-07-08 ENCOUNTER — TELEPHONE (OUTPATIENT)
Dept: INTERVENTIONAL RADIOLOGY/VASCULAR | Facility: CLINIC | Age: 70
End: 2025-07-08
Payer: MEDICARE

## 2025-07-08 DIAGNOSIS — N95.0 POSTMENOPAUSAL BLEEDING: Primary | ICD-10-CM

## 2025-07-08 NOTE — TELEPHONE ENCOUNTER
Preop phone call to discuss UAE for uterine AVM; referred by Dr. El Manjarrez   Patient denies AC, antiplatelet use, or other medications containing asa.  Pt denies issues laying flat. Pt denies CPAP use or O2 use at home.  Allergies reviewed, pt denies allergy to contrast.  OB/GYN notes reviewed.     Uterine fibroid embolization procedure reviewed in detail with the patient today with understanding verbalized.  Risks, benefits, potential complications, usual pre and post procedure course discussed including the use of conscious sedation and the need for overnight observation stay with understanding voiced. This procedure has been fully reviewed with the patient.       Procedure approved by neil Gudino.  Pt agreeable to moderate sedation.  3.  Pt denies taking AC, antiplatelet medication, or other medications containing asa.    4.  Allergies reviewed.  Pt denies allergy to contrast, lidocaine, betadine and chlorhexidine.  5.  Clinic phone number provided.  Pt advised to call if any further questions or need to reschedule.  Discussed with patient our nursing team will call the day before the procedure with reminder for instructions.  Discussed pt will need a ride to and from the procedure.  Discussed overnight stay for pain management.  Discussed driving restrictions.  No driving for 3 days.  No lifting anything heavier than a gallon of milk for 10 days and avoid strenuous activity/exercise for 10 days including climbing stairs.  Do not submerge in a bathtub, pool, or jacuzzi until the groin site is full closed at least 14 days. Avoid tampons and intercourse for 4-6 weeks Expect possible occasional vaginal discharge for 1- 3 weeks.       Montse Banks PA-C  Interventional Radiology

## 2025-07-09 ENCOUNTER — PATIENT MESSAGE (OUTPATIENT)
Dept: INTERVENTIONAL RADIOLOGY/VASCULAR | Facility: HOSPITAL | Age: 70
End: 2025-07-09
Payer: MEDICARE

## 2025-07-09 NOTE — NURSING
Pre-procedure call complete.  2 patient identifier used (name and ).      No food after midnight.  You may drink clear liquids (sports drinks, clear juices) until 2 hours before arrival time.  Clear liquids include only water, black coffee (no creamer), clear oral rehydration drinks, clear sports drinks and clear fruit juices.  Clear liquids do NOT include anything with pulp or food particles (chicken broth, ice cream, yogurt, jello, etc).  NO orange juice, pulpy juices, or apple cider.  If you can read newsprint through the liquid, it qualifies as clear.  IF UNSURE, drink water instead.      Have NOTHING BY MOUTH 2 hours before arrival time.  Medication the morning of your procedure may be taken with a sip of water     Pt aware will need someone to provide transport home and monitor pt 8 hours post procedure.  No driving for at least 24 hours after procedure.   Patient advised to take blood pressure, heart medications,  with a sip of water morning of procedure.  Patient verbalized aware of which medications to take.  Do not take , sleep medication (including OTC) and anxiety medication the night before procedure.  Arrival time and location given.  Expected length of stay reviewed.  Covid screening completed.  Pt verbalized understanding of all pre-procedure instructions.  Written instructions and directions sent to patient in [employer]/portal.

## 2025-07-10 ENCOUNTER — HOSPITAL ENCOUNTER (OUTPATIENT)
Dept: INTERVENTIONAL RADIOLOGY/VASCULAR | Facility: HOSPITAL | Age: 70
Discharge: HOME | End: 2025-07-11
Payer: MEDICARE

## 2025-07-10 DIAGNOSIS — N95.0 POSTMENOPAUSAL BLEEDING: ICD-10-CM

## 2025-07-10 DIAGNOSIS — R07.9 CHEST PAIN: ICD-10-CM

## 2025-07-10 DIAGNOSIS — D25.9 UTERINE LEIOMYOMA, UNSPECIFIED LOCATION: Primary | ICD-10-CM

## 2025-07-10 LAB
ABSOLUTE EOSINOPHIL (OHS): 0.03 K/UL
ABSOLUTE EOSINOPHIL (OHS): 0.05 K/UL
ABSOLUTE MONOCYTE (OHS): 0.32 K/UL (ref 0.3–1)
ABSOLUTE MONOCYTE (OHS): 0.39 K/UL (ref 0.3–1)
ABSOLUTE NEUTROPHIL COUNT (OHS): 2.73 K/UL (ref 1.8–7.7)
ABSOLUTE NEUTROPHIL COUNT (OHS): 7.56 K/UL (ref 1.8–7.7)
BASOPHILS # BLD AUTO: 0.02 K/UL
BASOPHILS # BLD AUTO: 0.02 K/UL
BASOPHILS NFR BLD AUTO: 0.2 %
BASOPHILS NFR BLD AUTO: 0.4 %
ERYTHROCYTE [DISTWIDTH] IN BLOOD BY AUTOMATED COUNT: 14.6 % (ref 11.5–14.5)
ERYTHROCYTE [DISTWIDTH] IN BLOOD BY AUTOMATED COUNT: 14.7 % (ref 11.5–14.5)
HCT VFR BLD AUTO: 20 % (ref 37–48.5)
HCT VFR BLD AUTO: 22 % (ref 37–48.5)
HGB BLD-MCNC: 6.4 GM/DL (ref 12–16)
HGB BLD-MCNC: 6.8 GM/DL (ref 12–16)
IMM GRANULOCYTES # BLD AUTO: 0.02 K/UL (ref 0–0.04)
IMM GRANULOCYTES # BLD AUTO: 0.05 K/UL (ref 0–0.04)
IMM GRANULOCYTES NFR BLD AUTO: 0.4 % (ref 0–0.5)
IMM GRANULOCYTES NFR BLD AUTO: 0.5 % (ref 0–0.5)
LYMPHOCYTES # BLD AUTO: 1.14 K/UL (ref 1–4.8)
LYMPHOCYTES # BLD AUTO: 1.61 K/UL (ref 1–4.8)
MCH RBC QN AUTO: 28.1 PG (ref 27–31)
MCH RBC QN AUTO: 28.8 PG (ref 27–31)
MCHC RBC AUTO-ENTMCNC: 30.9 G/DL (ref 32–36)
MCHC RBC AUTO-ENTMCNC: 32 G/DL (ref 32–36)
MCV RBC AUTO: 90 FL (ref 82–98)
MCV RBC AUTO: 91 FL (ref 82–98)
NUCLEATED RBC (/100WBC) (OHS): 0 /100 WBC
NUCLEATED RBC (/100WBC) (OHS): 0 /100 WBC
PLATELET # BLD AUTO: 183 K/UL (ref 150–450)
PLATELET # BLD AUTO: 204 K/UL (ref 150–450)
PMV BLD AUTO: 10.2 FL (ref 9.2–12.9)
PMV BLD AUTO: 10.3 FL (ref 9.2–12.9)
RBC # BLD AUTO: 2.22 M/UL (ref 4–5.4)
RBC # BLD AUTO: 2.42 M/UL (ref 4–5.4)
RELATIVE EOSINOPHIL (OHS): 0.3 %
RELATIVE EOSINOPHIL (OHS): 1.1 %
RELATIVE LYMPHOCYTE (OHS): 12.4 % (ref 18–48)
RELATIVE LYMPHOCYTE (OHS): 33.9 % (ref 18–48)
RELATIVE MONOCYTE (OHS): 4.2 % (ref 4–15)
RELATIVE MONOCYTE (OHS): 6.7 % (ref 4–15)
RELATIVE NEUTROPHIL (OHS): 57.5 % (ref 38–73)
RELATIVE NEUTROPHIL (OHS): 82.4 % (ref 38–73)
WBC # BLD AUTO: 4.75 K/UL (ref 3.9–12.7)
WBC # BLD AUTO: 9.19 K/UL (ref 3.9–12.7)

## 2025-07-10 PROCEDURE — C1887 CATHETER, GUIDING: HCPCS

## 2025-07-10 PROCEDURE — 25000003 PHARM REV CODE 250

## 2025-07-10 PROCEDURE — 85025 COMPLETE CBC W/AUTO DIFF WBC: CPT | Mod: 91

## 2025-07-10 PROCEDURE — C1757 CATH, THROMBECTOMY/EMBOLECT: HCPCS

## 2025-07-10 PROCEDURE — C1894 INTRO/SHEATH, NON-LASER: HCPCS

## 2025-07-10 PROCEDURE — 63600175 PHARM REV CODE 636 W HCPCS

## 2025-07-10 PROCEDURE — 85025 COMPLETE CBC W/AUTO DIFF WBC: CPT

## 2025-07-10 PROCEDURE — 36415 COLL VENOUS BLD VENIPUNCTURE: CPT

## 2025-07-10 PROCEDURE — 27800903 OPTIME MED/SURG SUP & DEVICES OTHER IMPLANTS

## 2025-07-10 PROCEDURE — C1760 CLOSURE DEV, VASC: HCPCS

## 2025-07-10 PROCEDURE — 75894 X-RAYS TRANSCATH THERAPY: CPT | Mod: TC

## 2025-07-10 PROCEDURE — C1769 GUIDE WIRE: HCPCS

## 2025-07-10 PROCEDURE — 63600175 PHARM REV CODE 636 W HCPCS: Performed by: STUDENT IN AN ORGANIZED HEALTH CARE EDUCATION/TRAINING PROGRAM

## 2025-07-10 PROCEDURE — 75894 X-RAYS TRANSCATH THERAPY: CPT | Mod: 26,,, | Performed by: STUDENT IN AN ORGANIZED HEALTH CARE EDUCATION/TRAINING PROGRAM

## 2025-07-10 PROCEDURE — A4216 STERILE WATER/SALINE, 10 ML: HCPCS

## 2025-07-10 PROCEDURE — 86901 BLOOD TYPING SEROLOGIC RH(D): CPT | Performed by: PHYSICIAN ASSISTANT

## 2025-07-10 PROCEDURE — 25500020 PHARM REV CODE 255

## 2025-07-10 PROCEDURE — 63600175 PHARM REV CODE 636 W HCPCS: Performed by: PHYSICIAN ASSISTANT

## 2025-07-10 PROCEDURE — 36415 COLL VENOUS BLD VENIPUNCTURE: CPT | Performed by: PHYSICIAN ASSISTANT

## 2025-07-10 RX ORDER — HYDROMORPHONE HCL IN 0.9% NACL 6 MG/30 ML
PATIENT CONTROLLED ANALGESIA SYRINGE INTRAVENOUS CONTINUOUS
Refills: 0 | Status: DISCONTINUED | OUTPATIENT
Start: 2025-07-10 | End: 2025-07-10

## 2025-07-10 RX ORDER — HYDROCODONE BITARTRATE AND ACETAMINOPHEN 500; 5 MG/1; MG/1
TABLET ORAL
Status: CANCELLED | OUTPATIENT
Start: 2025-07-10

## 2025-07-10 RX ORDER — IBUPROFEN 200 MG
16 TABLET ORAL
Status: DISCONTINUED | OUTPATIENT
Start: 2025-07-10 | End: 2025-07-11 | Stop reason: HOSPADM

## 2025-07-10 RX ORDER — SODIUM CHLORIDE 0.9 % (FLUSH) 0.9 %
10 SYRINGE (ML) INJECTION EVERY 8 HOURS
Status: DISCONTINUED | OUTPATIENT
Start: 2025-07-10 | End: 2025-07-11 | Stop reason: HOSPADM

## 2025-07-10 RX ORDER — FENTANYL CITRATE 50 UG/ML
INJECTION, SOLUTION INTRAMUSCULAR; INTRAVENOUS
Status: COMPLETED | OUTPATIENT
Start: 2025-07-10 | End: 2025-07-10

## 2025-07-10 RX ORDER — TALC
6 POWDER (GRAM) TOPICAL NIGHTLY PRN
Status: DISCONTINUED | OUTPATIENT
Start: 2025-07-10 | End: 2025-07-11 | Stop reason: HOSPADM

## 2025-07-10 RX ORDER — IPRATROPIUM BROMIDE AND ALBUTEROL SULFATE 2.5; .5 MG/3ML; MG/3ML
3 SOLUTION RESPIRATORY (INHALATION) EVERY 6 HOURS PRN
Status: DISCONTINUED | OUTPATIENT
Start: 2025-07-10 | End: 2025-07-11 | Stop reason: HOSPADM

## 2025-07-10 RX ORDER — NALOXONE HCL 0.4 MG/ML
0.02 VIAL (ML) INJECTION
Status: DISCONTINUED | OUTPATIENT
Start: 2025-07-10 | End: 2025-07-10

## 2025-07-10 RX ORDER — PROCHLORPERAZINE EDISYLATE 5 MG/ML
5 INJECTION INTRAMUSCULAR; INTRAVENOUS EVERY 6 HOURS PRN
Status: DISCONTINUED | OUTPATIENT
Start: 2025-07-10 | End: 2025-07-11 | Stop reason: HOSPADM

## 2025-07-10 RX ORDER — MIDAZOLAM HYDROCHLORIDE 1 MG/ML
INJECTION, SOLUTION INTRAMUSCULAR; INTRAVENOUS
Status: COMPLETED | OUTPATIENT
Start: 2025-07-10 | End: 2025-07-10

## 2025-07-10 RX ORDER — ACETAMINOPHEN 325 MG/1
650 TABLET ORAL EVERY 4 HOURS PRN
Status: DISCONTINUED | OUTPATIENT
Start: 2025-07-10 | End: 2025-07-11 | Stop reason: HOSPADM

## 2025-07-10 RX ORDER — SIMETHICONE 80 MG
1 TABLET,CHEWABLE ORAL 4 TIMES DAILY PRN
Status: DISCONTINUED | OUTPATIENT
Start: 2025-07-10 | End: 2025-07-11 | Stop reason: HOSPADM

## 2025-07-10 RX ORDER — HYDROCODONE BITARTRATE AND ACETAMINOPHEN 500; 5 MG/1; MG/1
TABLET ORAL
Status: DISCONTINUED | OUTPATIENT
Start: 2025-07-11 | End: 2025-07-11 | Stop reason: HOSPADM

## 2025-07-10 RX ORDER — ACETAMINOPHEN 325 MG/1
TABLET ORAL
Status: DISPENSED
Start: 2025-07-10 | End: 2025-07-11

## 2025-07-10 RX ORDER — OXYCODONE HYDROCHLORIDE 5 MG/1
5 TABLET ORAL EVERY 4 HOURS PRN
Refills: 0 | Status: DISCONTINUED | OUTPATIENT
Start: 2025-07-10 | End: 2025-07-11 | Stop reason: HOSPADM

## 2025-07-10 RX ORDER — ALUMINUM HYDROXIDE, MAGNESIUM HYDROXIDE, AND SIMETHICONE 1200; 120; 1200 MG/30ML; MG/30ML; MG/30ML
30 SUSPENSION ORAL 4 TIMES DAILY PRN
Status: DISCONTINUED | OUTPATIENT
Start: 2025-07-10 | End: 2025-07-11 | Stop reason: HOSPADM

## 2025-07-10 RX ORDER — GLUCAGON 1 MG
1 KIT INJECTION
Status: DISCONTINUED | OUTPATIENT
Start: 2025-07-10 | End: 2025-07-11 | Stop reason: HOSPADM

## 2025-07-10 RX ORDER — HYDROMORPHONE HYDROCHLORIDE 1 MG/ML
0.2 INJECTION, SOLUTION INTRAMUSCULAR; INTRAVENOUS; SUBCUTANEOUS EVERY 6 HOURS PRN
Status: DISCONTINUED | OUTPATIENT
Start: 2025-07-10 | End: 2025-07-11 | Stop reason: HOSPADM

## 2025-07-10 RX ORDER — AMLODIPINE BESYLATE 2.5 MG/1
2.5 TABLET ORAL DAILY
Status: DISCONTINUED | OUTPATIENT
Start: 2025-07-10 | End: 2025-07-11 | Stop reason: HOSPADM

## 2025-07-10 RX ORDER — NALOXONE HCL 0.4 MG/ML
0.02 VIAL (ML) INJECTION
Status: DISCONTINUED | OUTPATIENT
Start: 2025-07-10 | End: 2025-07-11 | Stop reason: HOSPADM

## 2025-07-10 RX ORDER — NITROGLYCERIN 5 MG/ML
INJECTION, SOLUTION INTRAVENOUS
Status: COMPLETED | OUTPATIENT
Start: 2025-07-10 | End: 2025-07-10

## 2025-07-10 RX ORDER — LIDOCAINE HYDROCHLORIDE 10 MG/ML
INJECTION, SOLUTION INFILTRATION; PERINEURAL
Status: COMPLETED | OUTPATIENT
Start: 2025-07-10 | End: 2025-07-10

## 2025-07-10 RX ORDER — ONDANSETRON HYDROCHLORIDE 2 MG/ML
4 INJECTION, SOLUTION INTRAVENOUS ONCE AS NEEDED
Status: DISCONTINUED | OUTPATIENT
Start: 2025-07-10 | End: 2025-07-10

## 2025-07-10 RX ORDER — ONDANSETRON 8 MG/1
8 TABLET, ORALLY DISINTEGRATING ORAL EVERY 8 HOURS PRN
Status: DISCONTINUED | OUTPATIENT
Start: 2025-07-10 | End: 2025-07-11 | Stop reason: HOSPADM

## 2025-07-10 RX ORDER — POLYETHYLENE GLYCOL 3350 17 G/17G
17 POWDER, FOR SOLUTION ORAL DAILY PRN
Status: DISCONTINUED | OUTPATIENT
Start: 2025-07-10 | End: 2025-07-11 | Stop reason: HOSPADM

## 2025-07-10 RX ORDER — DIPHENHYDRAMINE HCL 25 MG
25 CAPSULE ORAL EVERY 4 HOURS PRN
Status: DISCONTINUED | OUTPATIENT
Start: 2025-07-10 | End: 2025-07-10

## 2025-07-10 RX ORDER — CEFAZOLIN SODIUM 1 G/3ML
INJECTION, POWDER, FOR SOLUTION INTRAMUSCULAR; INTRAVENOUS
Status: COMPLETED | OUTPATIENT
Start: 2025-07-10 | End: 2025-07-10

## 2025-07-10 RX ORDER — IBUPROFEN 200 MG
24 TABLET ORAL
Status: DISCONTINUED | OUTPATIENT
Start: 2025-07-10 | End: 2025-07-11 | Stop reason: HOSPADM

## 2025-07-10 RX ORDER — SODIUM CHLORIDE 9 MG/ML
INJECTION, SOLUTION INTRAVENOUS CONTINUOUS
Status: DISCONTINUED | OUTPATIENT
Start: 2025-07-10 | End: 2025-07-11 | Stop reason: HOSPADM

## 2025-07-10 RX ADMIN — MIDAZOLAM HYDROCHLORIDE 1 MG: 2 INJECTION, SOLUTION INTRAMUSCULAR; INTRAVENOUS at 08:07

## 2025-07-10 RX ADMIN — FENTANYL CITRATE 25 MCG: 50 INJECTION, SOLUTION INTRAMUSCULAR; INTRAVENOUS at 10:07

## 2025-07-10 RX ADMIN — FENTANYL CITRATE 50 MCG: 50 INJECTION, SOLUTION INTRAMUSCULAR; INTRAVENOUS at 12:07

## 2025-07-10 RX ADMIN — FENTANYL CITRATE 50 MCG: 50 INJECTION, SOLUTION INTRAMUSCULAR; INTRAVENOUS at 11:07

## 2025-07-10 RX ADMIN — MIDAZOLAM HYDROCHLORIDE 1 MG: 2 INJECTION, SOLUTION INTRAMUSCULAR; INTRAVENOUS at 09:07

## 2025-07-10 RX ADMIN — FENTANYL CITRATE 25 MCG: 50 INJECTION, SOLUTION INTRAMUSCULAR; INTRAVENOUS at 11:07

## 2025-07-10 RX ADMIN — MIDAZOLAM HYDROCHLORIDE 0.5 MG: 2 INJECTION, SOLUTION INTRAMUSCULAR; INTRAVENOUS at 08:07

## 2025-07-10 RX ADMIN — FENTANYL CITRATE 50 MCG: 50 INJECTION, SOLUTION INTRAMUSCULAR; INTRAVENOUS at 08:07

## 2025-07-10 RX ADMIN — NITROGLYCERIN 20 MCG: 5 INJECTION, SOLUTION INTRAVENOUS at 11:07

## 2025-07-10 RX ADMIN — LIDOCAINE HYDROCHLORIDE 5 ML: 10 INJECTION, SOLUTION INFILTRATION; PERINEURAL at 08:07

## 2025-07-10 RX ADMIN — MIDAZOLAM HYDROCHLORIDE 0.5 MG: 2 INJECTION, SOLUTION INTRAMUSCULAR; INTRAVENOUS at 09:07

## 2025-07-10 RX ADMIN — MIDAZOLAM HYDROCHLORIDE 0.5 MG: 2 INJECTION, SOLUTION INTRAMUSCULAR; INTRAVENOUS at 10:07

## 2025-07-10 RX ADMIN — NITROGLYCERIN 10 MCG: 5 INJECTION, SOLUTION INTRAVENOUS at 11:07

## 2025-07-10 RX ADMIN — IOHEXOL 180 ML: 300 INJECTION, SOLUTION INTRAVENOUS at 12:07

## 2025-07-10 RX ADMIN — FENTANYL CITRATE 50 MCG: 50 INJECTION, SOLUTION INTRAMUSCULAR; INTRAVENOUS at 09:07

## 2025-07-10 RX ADMIN — FENTANYL CITRATE 25 MCG: 50 INJECTION, SOLUTION INTRAMUSCULAR; INTRAVENOUS at 08:07

## 2025-07-10 RX ADMIN — FENTANYL CITRATE 25 MCG: 50 INJECTION, SOLUTION INTRAMUSCULAR; INTRAVENOUS at 09:07

## 2025-07-10 RX ADMIN — ACETAMINOPHEN 650 MG: 325 TABLET ORAL at 04:07

## 2025-07-10 RX ADMIN — CEFAZOLIN 1 G: 330 INJECTION, POWDER, FOR SOLUTION INTRAMUSCULAR; INTRAVENOUS at 08:07

## 2025-07-10 RX ADMIN — MIDAZOLAM HYDROCHLORIDE 0.5 MG: 2 INJECTION, SOLUTION INTRAMUSCULAR; INTRAVENOUS at 11:07

## 2025-07-10 RX ADMIN — ACETAMINOPHEN 650 MG: 325 TABLET ORAL at 10:07

## 2025-07-10 RX ADMIN — Medication 10 ML: at 10:07

## 2025-07-10 RX ADMIN — Medication 1 G: at 11:07

## 2025-07-10 RX ADMIN — MIDAZOLAM HYDROCHLORIDE 1 MG: 2 INJECTION, SOLUTION INTRAMUSCULAR; INTRAVENOUS at 11:07

## 2025-07-10 RX ADMIN — Medication: at 12:07

## 2025-07-10 NOTE — NURSING
Pt arrive to [facility] 5 to recover for 2 hrs post UAE. Received report from [facility] RN's. See flowsheets for assessment ans vs.

## 2025-07-10 NOTE — H&P
History & Physical    Subjective:      Chief Complaint/Reason for Admission: Uterine artery embolization    Polina Griffin is a 69 y.o. female with PMHx of uterine fibroid s/p D&C hysteroscopy. She was directly admitted to IR for urgent uterine artery embolization. Patient was seen and examined in MPU following her IR procedure. She notes increased vaginal bleeding following her procedure, MD notified per nursing. Denies any pain, dizziness, or lightheadedness. Also complains of back pain due to prolonged supine position. Denies any abdominal or suprapubic pain. No NV.     Past Medical History:   Diagnosis Date    Abnormal Pap smear of cervix 2011, 2013    LEEP    Anemia     in her 20's    Esophageal reflux     Fibroids     Fibromatosis of plantar fascia     Hypertension     Insomnia     Irritable bowel syndrome (IBS)     Ocular migraine     Renal disorder     Tinnitus     Urge incontinence      Past Surgical History:   Procedure Laterality Date    APPENDECTOMY  2008    CATARACT EXTRACTION Bilateral 2021    CERVICAL BIOPSY  W/ LOOP ELECTRODE EXCISION  2011    COLONOSCOPY N/A 12/11/2017    Procedure: COLONOSCOPY;  Surgeon: Angel Florez MD;  Location: [facility] ([address] [place] [address]);  Service: Endoscopy;  Laterality: N/A;    COLONOSCOPY N/A 12/07/2022    Procedure: COLONOSCOPY;  Surgeon: Elle Armenta MD;  Location: [facility] ENDO;  Service: Endoscopy;  Laterality: N/A;    COLONOSCOPY N/A 11/20/2024    Procedure: COLONOSCOPY;  Surgeon: Elle Armenta MD;  Location: [facility];  Service: Endoscopy;  Laterality: N/A;  aly/rectal bleeding    DILATION AND CURETTAGE OF UTERUS  06/10/2025    ENDOMETRIAL BIOPSY  2010    NASAL TURBINATE REDUCTION  2015    Skin Cancer Removal Right     SUPERFICIAL KERATECTOMY Right 07/2021    TUBAL LIGATION Bilateral 1980    UPPER GASTROINTESTINAL ENDOSCOPY      WISDOM TOOTH EXTRACTION Bilateral 1973     Social History[1]    (Not in a hospital admission)    Review of patient's allergies  indicates:   Allergen Reactions    Amoxicillin Rash        Review of Systems   Constitutional:  Negative for chills, diaphoresis, fever, malaise/fatigue and weight loss.   HENT:  Negative for sore throat.    Eyes:  Negative for blurred vision.   Respiratory:  Negative for cough, shortness of breath and wheezing.    Cardiovascular:  Negative for chest pain, palpitations, orthopnea, leg swelling and PND.   Gastrointestinal:  Negative for abdominal pain, blood in stool, constipation, diarrhea, nausea and vomiting.   Genitourinary:  Negative for dysuria and hematuria.        Vaginal bleeding   Musculoskeletal:  Positive for back pain. Negative for myalgias.   Neurological:  Negative for dizziness, tingling and weakness.   Psychiatric/Behavioral:  The patient is not nervous/anxious.        Objective:      Vital Signs (Most Recent)  Temp: 96.9 °F (36.1 °C) (07/10/25 1235)  Pulse: 89 (07/10/25 1335)  Resp: 15 (07/10/25 1335)  BP: (!) 143/67 (07/10/25 1335)  SpO2: 99 % (07/10/25 1335)    Vital Signs Range (Last 24H):  Temp:  [96.9 °F (36.1 °C)-98.6 °F (37 °C)]   Pulse:  [80-99]   Resp:  [8-26]   BP: (101-174)/(52-74)   SpO2:  [95 %-100 %]     Physical Exam  Vitals and nursing note reviewed.   Constitutional:       General: She is not in acute distress.     Appearance: She is well-developed.   HENT:      Head: Normocephalic and atraumatic.      Mouth/Throat:      Pharynx: No oropharyngeal exudate.   Eyes:      Conjunctiva/sclera: Conjunctivae normal.      Pupils: Pupils are equal, round, and reactive to light.   Cardiovascular:      Rate and Rhythm: Normal rate and regular rhythm.      Heart sounds: Normal heart sounds.   Pulmonary:      Effort: Pulmonary effort is normal. No respiratory distress.      Breath sounds: Normal breath sounds. No wheezing.   Abdominal:      General: Bowel sounds are normal. There is no distension.      Palpations: Abdomen is soft.      Tenderness: There is no abdominal tenderness.    Musculoskeletal:         General: No tenderness. Normal range of motion.      Cervical back: Normal range of motion and neck supple.   Lymphadenopathy:      Cervical: No cervical adenopathy.   Skin:     General: Skin is warm and dry.      Capillary Refill: Capillary refill takes less than 2 seconds.      Findings: No rash.   Neurological:      Mental Status: She is alert and oriented to person, place, and time.      Cranial Nerves: No cranial nerve deficit.      Sensory: No sensory deficit.      Coordination: Coordination normal.   Psychiatric:         Behavior: Behavior normal.         Thought Content: Thought content normal.         Judgment: Judgment normal.         Data Review:  CBC:   Lab Results   Component Value Date    WBC 6.6 07/02/2025    WBC 7.14 06/17/2025    RBC 4.53 07/02/2025    RBC 4.16 06/17/2025    RBC 4.61 08/06/2024    HGB 12.5 07/02/2025    HGB 13.2 08/06/2024    HCT 40.5 07/02/2025    HCT 41.0 08/06/2024     07/02/2025     06/17/2025     08/06/2024     BMP:   Lab Results   Component Value Date     (H) 07/02/2025     07/02/2025    K 4.0 07/02/2025     07/02/2025    CO2 24 07/02/2025    BUN 9 07/02/2025    BUN 13 06/17/2025    CREATININE 0.77 07/02/2025    CREATININE 0.7 06/17/2025    CALCIUM 9.6 07/02/2025      ECG: NO ECG during this admission.     Assessment:      Active Hospital Problems    Diagnosis  POA    Uterine fibroid [D25.9]  Yes    Postmenopausal bleeding [N95.0]  Yes    Hypertension [I10]  Yes      Resolved Hospital Problems   No resolved problems to display.       Plan:    Uterine Fibroids  Uterine AVM  - S/p uterine artery embolization today with IR  - On PCA pump for pain management   - Will wean off as tolerable.   - CBC pending      Hypertension  - Continue amlodipine    Jose G Bowser PA-C  Department of Hospital Medicine             [1]   Social History  Tobacco Use    Smoking status: Never    Smokeless tobacco: Never   Substance  Use Topics    Alcohol use: No    Drug use: No

## 2025-07-10 NOTE — H&P
Radiology History & Physical      SUBJECTIVE:     Chief Complaint: uterine bleeding    History of Present Illness:  Polina Griffin is a 69 y.o. female with uterine AVM who presents for uterine artery embolization.    Past Medical History:   Diagnosis Date    Abnormal Pap smear of cervix 2011, 2013    LEEP    Anemia     in her 20's    Esophageal reflux     Fibroids     Fibromatosis of plantar fascia     Hypertension     Insomnia     Irritable bowel syndrome (IBS)     Ocular migraine     Renal disorder     Tinnitus     Urge incontinence      Past Surgical History:   Procedure Laterality Date    APPENDECTOMY  2008    CATARACT EXTRACTION Bilateral 2021    CERVICAL BIOPSY  W/ LOOP ELECTRODE EXCISION  2011    COLONOSCOPY N/A 12/11/2017    Procedure: COLONOSCOPY;  Surgeon: Angel Florez MD;  Location: [facility] ENDO ([address] [place] [address]);  Service: Endoscopy;  Laterality: N/A;    COLONOSCOPY N/A 12/07/2022    Procedure: COLONOSCOPY;  Surgeon: Elle Armenta MD;  Location: [facility] ENDO;  Service: Endoscopy;  Laterality: N/A;    COLONOSCOPY N/A 11/20/2024    Procedure: COLONOSCOPY;  Surgeon: Elle Armenta MD;  Location: [facility] ENDO;  Service: Endoscopy;  Laterality: N/A;  aly/rectal bleeding    DILATION AND CURETTAGE OF UTERUS  06/10/2025    ENDOMETRIAL BIOPSY  2010    NASAL TURBINATE REDUCTION  2015    SUPERFICIAL KERATECTOMY Right 07/2021    TUBAL LIGATION Bilateral 1980    UPPER GASTROINTESTINAL ENDOSCOPY      WISDOM TOOTH EXTRACTION Bilateral 1973       Home Meds:   Prior to Admission medications    Medication Sig Start Date End Date Taking? Authorizing Provider   ferrous sulfate 325 (65 FE) MG EC tablet Take 1 tablet (325 mg total) by mouth once daily. 7/3/25 8/2/25 Yes Jennifer Castle PA-C   fluticasone (FLONASE) 50 mcg/actuation nasal spray 1 spray by Each Nostril route once daily.   Yes Provider, Historical   mv-min/vit C/elderber/herb 124 (AIRBORNE ELDERBERRY ORAL) 1 tab, Chewed, Daily, 0 Refill(s) 6/4/25  Yes Provider,  Historical   NORVASC 2.5 mg tablet 2.5 mg. 6/19/25  Yes Provider, Historical   ondansetron (ZOFRAN-ODT) 4 MG TbDL Take 1 tablet (4 mg total) by mouth every 6 (six) hours as needed (nausea). 6/27/25  Yes Jennifer Castle PA-C   tranexamic acid (LYSTEDA) 650 mg tablet Take 2 tablets (1,300 mg total) by mouth 3 (three) times daily. for 5 days 7/7/25 7/12/25 Yes El Manjarrez MD   BENEFIBER, GUAR GUM, ORAL Take by mouth once daily. 3 Gummies a day 5/7/24   Provider, Historical   CALCIUM PHOSPHATE TRIB/VIT D3 (CITRACAL + D3, CALCIUM PHOS, ORAL) Take by mouth.    Provider, Historical   coenzyme Q10 100 mg capsule Take 100 mg by mouth once daily.    Provider, Historical   multivitamin capsule Take 1 capsule by mouth once daily.    Provider, Historical   tranexamic acid (LYSTEDA) 650 mg tablet 1,300 mg. 6/19/25   Provider, Historical   vit C,Z-Iw-nteab-lutein-zeaxan (PRESERVISION AREDS 2) 250-90-40-1 mg Cap Take 1 tablet by mouth once daily.    Provider, Historical     Anticoagulants/Antiplatelets: no anticoagulation    Allergies:   Review of patient's allergies indicates:   Allergen Reactions    Amoxicillin Rash     Sedation History:  no adverse reactions    Review of Systems:   Hematological: no known coagulopathies  Respiratory: no shortness of breath  Cardiovascular: no chest pain  Gastrointestinal: no abdominal pain  Genito-Urinary: no dysuria  Musculoskeletal: negative  Neurological: no TIA or stroke symptoms         OBJECTIVE:     Vital Signs (Most Recent)  Temp: 98.6 °F (37 °C) (07/10/25 0651)  Pulse: 88 (07/10/25 0651)  Resp: 16 (07/10/25 0651)  BP: (!) 125/59 (07/10/25 0652)  SpO2: 95 % (07/10/25 0651)    Physical Exam:  ASA: 3  Mallampati: 2    General: no acute distress  Mental Status: alert and oriented to person, place and time  HEENT: normocephalic, atraumatic  Chest: unlabored breathing  Heart: regular heart rate  Abdomen: nondistended  Extremity: moves all extremities    Laboratory  Lab Results    Component Value Date    INR 0.9 07/02/2025       Lab Results   Component Value Date    WBC 6.6 07/02/2025    HGB 12.5 07/02/2025    HCT 40.5 07/02/2025    MCV 89 07/02/2025     07/02/2025      Lab Results   Component Value Date     (H) 07/02/2025     07/02/2025    K 4.0 07/02/2025     07/02/2025    CO2 24 07/02/2025    BUN 9 07/02/2025    CREATININE 0.77 07/02/2025    CALCIUM 9.6 07/02/2025    ALT 11 07/02/2025    AST 15 07/02/2025    ALBUMIN 4.6 07/02/2025    BILITOT 0.4 07/02/2025       ASSESSMENT/PLAN:     Sedation Plan: up to moderate  Patient will undergo uterine artery embolization.    Written consent was obtained from the patient. All questions answered.     Cecil Mckinney MD  Radiology PGY-2

## 2025-07-10 NOTE — PLAN OF CARE
Pt arrived to IR rm 189 for UAE. Pt oriented to unit and staff. Plan of care reviewed with patient, patient verbalizes understanding. Comfort measures utilized. Pt safely transferred from stretcher to procedural table. Fall risk reviewed with patient, fall risk interventions maintained. Safety strap applied, positioner pillows utilized to minimize pressure points. Blankets applied. Pt prepped and draped utilizing standard sterile technique. Patient placed on continuous monitoring, as required by sedation policy. Timeouts completed utilizing standard universal time-out, per department and facility policy. RN to remain at bedside, continuous monitoring maintained. Pt resting comfortably. Denies pain/discomfort. Will continue to monitor. See flow sheets for monitoring, medication administration, and updates.

## 2025-07-10 NOTE — DISCHARGE INSTRUCTIONS
UTERINE ARTERY EMBOLIZATION GROIN RECOVERY INSTRUCTIONS:    You have had a procedure called a uterine artery embolization. This procedure is usually performed by a specially trained doctor called an interventional radiologist. The procedure allows the doctor visualize the arteries in you body, and to either stop blood flow or improve blood flow to different parts of your body. A catheter - a thin, flexible tube - was inserted into one of your blood vessels through a small incision in your groin and guided to a specific artery to deliver the therapy. Please follow the following instructions while recovering:    Monitor the groin site for bleeding. Apply firm pressure to the groin site if you need to cough, during sneezes, and in the event you have to vomit. This reduces the risk of your site bleeding. If bleeding does occurs, apply firm, manual pressure and seek medical assistance immediately!  Do not shower/bathe tonight. Shower tomorrow, at least 24 hours post-procedure. After your shower, you may remove the groin dressing.   Carefully cleanse the groin site with gentle soap and water; do not pick at any scab formation.  Monitor the groin site for signs and symptoms of infection (See below).      Recovering at Home:  Follow your doctor's recommendations on when it is safe to drive - at least THREE days after your procedure.  Do not lift anything heavier than a gallon of milk for the next TEN days.  Avoid strenuous activity/exercise for the next TEN days - this includes climbing stairs.   Do not submerge in a bathtub, pool, or Jacuzzi until the groin site is fully closed - at least 14 days.  Rest often. You may be more tired than usual.  Take your medications exactly as directed. Do not skip doses. Note - some medications should not be resumed after this procedure to prevent any adverse interaction with the contrast dye, which may be harmful to your kidneys. Be sure to ask your healthcare team about any potential  reactions and for guidance on what medications to hold.  Unless directed otherwise, drink six to eight glasses of water a day for the next TWO days to prevent dehydration and to help flush your body of the contrast dye used during your procedure.  Take your temperature and check the groin site for signs of infection - redness, swelling, drainage, or warmth - every day for one week.    When to call your doctor:  Fever of 100.4°F (38°C) or higher, or as directed by your health care provider.  Sudden shortness breath or any bleeding, bruising, or a large area of swelling at the groin site.  Signs of an allergic reaction to the contrast dye: rash, nausea, vomiting, or trouble breathing.  Signs of infection at the groin site: increased pain, redness, swelling, warmth, or foul-smelling drainage.   Constant or increasing pain or numbness in your leg.  Your leg feels cold, looks blue; numbness and/or tingling in the leg, especially near the catheter insertion site.   Rapid, pounding, or irregular heartbeat.  Blood in your urine or black, tarry stools.     Interventional Radiology Clinic     For complications   [phone]. Monday - Friday, 8:00 am - 4:00 pm    [phone] After hours and on holidays. Ask to speak with the interventional radiologist on call.     For Scheduling   [phone] Monday - Friday, 8:00 am - 4:00 pm         ..Our goal at [facility] is to always give you outstanding care and exceptional service. You may receive a survey from [employer] by mail, text or e-mail in the next 24-48 hours asking about the care you received with us. The survey should only take 5-10 minutes to complete and is very important to us.     Your feedback provides us with a way to recognize our staff who work tirelessly to provide the best care! Also, your responses help us learn how to improve when your experience was below our aspiration of excellence. We are always looking for ways to improve your stay. We WILL use your  feedback to continue making improvements to help us provide the highest quality care. We keep your personal information and feedback confidential. We appreciate your time completing this survey and can't wait to hear from you!!!    We look forward to your continued care with us! Thanks so much for choosing [facility] for your healthcare needs!

## 2025-07-10 NOTE — NURSING
Patient had episode of emesis after drinking water, linens changed. Small amount of vaginal bleeding noted.

## 2025-07-10 NOTE — PLAN OF CARE
Received pt to SSCU from home accompanied by spouse.  AAO x 4. Denies pain or discomfort. Respirations even and unlabored. No distress noted. Pt stable.  Admit assessment complete. IV x 1 placed.  Pt oriented to room and call bell placed within reach.  Instructed pt to call for assistance as needed and  pt voiced understanding.

## 2025-07-10 NOTE — NURSING
Patient arrived to unit via stretcher. Patient able to ambulate from stretcher to bed. Mcintyre cath in place draining to gravity and with a small amount of vaginal bleeding noted. Patient assisted to bed, call light in reach, bed in lowest position. Care ongoing.

## 2025-07-10 NOTE — NURSING
Post-procedure recovery completed. Patient AAOx3, no distress noted, respirations even and unlabored, VSS. Right groin site clean, dry, and intact; no bleeding or hematoma noted. Patient to be transferred to room number 1142 via patient transporter. Patient stable for transport.

## 2025-07-10 NOTE — PROCEDURES
IR Post-Procedure Note    Pre Op Diagnosis: uterine AVM/PSA    Post Op Diagnosis: same    Procedure: Uterine artery embolization    Procedure performed by: Christine Rojas MD    Written Informed Consent Obtained: Yes    Specimen Removed:  No    Estimated Blood Loss: Minimal    Findings: Local anesthesia and moderate sedation were used.    RCFA access and pelvic angiography was performed.     A microcatheter was inserted into the left uterine artery and cross-filling to pseudoaneurysm was noted. A cervical branch was coiled, and left uterine artery was embolized with Gelfoam.     Right uterine artery was catheterized and did not supply pseudoaneurysm. R ovarian artery was catheterized and supplied large pseudoaneurysm, successfully embolized with Gelfoam.      The patient tolerated the procedure well and there were no complications.  Please see Imaging report for further details.    Plan:  Overnight admission for pain management (PCA pump)  Ok to discharge in AM when pain is controlled, patient able to void and afebrile  CTA A/P with contrast in one week  Encourage fluid intake  4. Return to normal activity in 4-7 days    6. Expect possible occasional vaginal discharge for 1- 3 weeks  7. Plan for follow up in 3 weeks, 3 months in IR clinic (virtually)         Christine Rojas MD  Interventional Radiology

## 2025-07-11 VITALS
HEIGHT: 66 IN | HEART RATE: 95 BPM | RESPIRATION RATE: 18 BRPM | WEIGHT: 160.94 LBS | DIASTOLIC BLOOD PRESSURE: 68 MMHG | SYSTOLIC BLOOD PRESSURE: 145 MMHG | TEMPERATURE: 99 F | OXYGEN SATURATION: 94 % | BODY MASS INDEX: 25.86 KG/M2

## 2025-07-11 LAB
ABO + RH BLD: NORMAL
ABSOLUTE EOSINOPHIL (OHS): 0.11 K/UL
ABSOLUTE MONOCYTE (OHS): 0.36 K/UL (ref 0.3–1)
ABSOLUTE NEUTROPHIL COUNT (OHS): 4.72 K/UL (ref 1.8–7.7)
ALBUMIN SERPL BCP-MCNC: 3.2 G/DL (ref 3.5–5.2)
ALP SERPL-CCNC: 55 UNIT/L (ref 40–150)
ALT SERPL W/O P-5'-P-CCNC: 8 UNIT/L (ref 10–44)
ANION GAP (OHS): 6 MMOL/L (ref 8–16)
AST SERPL-CCNC: 13 UNIT/L (ref 11–45)
BASOPHILS # BLD AUTO: 0.02 K/UL
BASOPHILS NFR BLD AUTO: 0.3 %
BILIRUB SERPL-MCNC: 0.7 MG/DL (ref 0.1–1)
BLD PROD TYP BPU: NORMAL
BLOOD UNIT EXPIRATION DATE: NORMAL
BLOOD UNIT TYPE CODE: 5100
BUN SERPL-MCNC: 7 MG/DL (ref 8–23)
CALCIUM SERPL-MCNC: 7.6 MG/DL (ref 8.7–10.5)
CHLORIDE SERPL-SCNC: 107 MMOL/L (ref 95–110)
CO2 SERPL-SCNC: 27 MMOL/L (ref 23–29)
CREAT SERPL-MCNC: 0.6 MG/DL (ref 0.5–1.4)
CROSSMATCH INTERPRETATION: NORMAL
DISPENSE STATUS: NORMAL
ERYTHROCYTE [DISTWIDTH] IN BLOOD BY AUTOMATED COUNT: 14.6 % (ref 11.5–14.5)
GFR SERPLBLD CREATININE-BSD FMLA CKD-EPI: >60 ML/MIN/1.73/M2
GLUCOSE SERPL-MCNC: 89 MG/DL (ref 70–110)
HCT VFR BLD AUTO: 22.7 % (ref 37–48.5)
HGB BLD-MCNC: 7.4 GM/DL (ref 12–16)
IMM GRANULOCYTES # BLD AUTO: 0.02 K/UL (ref 0–0.04)
IMM GRANULOCYTES NFR BLD AUTO: 0.3 % (ref 0–0.5)
INDIRECT COOMBS: NORMAL
LYMPHOCYTES # BLD AUTO: 1.5 K/UL (ref 1–4.8)
MAGNESIUM SERPL-MCNC: 2 MG/DL (ref 1.6–2.6)
MCH RBC QN AUTO: 28.9 PG (ref 27–31)
MCHC RBC AUTO-ENTMCNC: 32.6 G/DL (ref 32–36)
MCV RBC AUTO: 89 FL (ref 82–98)
NUCLEATED RBC (/100WBC) (OHS): 0 /100 WBC
PHOSPHATE SERPL-MCNC: 2.9 MG/DL (ref 2.7–4.5)
PLATELET # BLD AUTO: 181 K/UL (ref 150–450)
PMV BLD AUTO: 10.2 FL (ref 9.2–12.9)
POTASSIUM SERPL-SCNC: 3.7 MMOL/L (ref 3.5–5.1)
PROT SERPL-MCNC: 5.3 GM/DL (ref 6–8.4)
RBC # BLD AUTO: 2.56 M/UL (ref 4–5.4)
RELATIVE EOSINOPHIL (OHS): 1.6 %
RELATIVE LYMPHOCYTE (OHS): 22.3 % (ref 18–48)
RELATIVE MONOCYTE (OHS): 5.3 % (ref 4–15)
RELATIVE NEUTROPHIL (OHS): 70.2 % (ref 38–73)
RH BLD: NORMAL
SODIUM SERPL-SCNC: 140 MMOL/L (ref 136–145)
SPECIMEN OUTDATE: NORMAL
UNIT NUMBER: NORMAL
WBC # BLD AUTO: 6.73 K/UL (ref 3.9–12.7)

## 2025-07-11 PROCEDURE — 86920 COMPATIBILITY TEST SPIN: CPT | Performed by: PHYSICIAN ASSISTANT

## 2025-07-11 PROCEDURE — 25000003 PHARM REV CODE 250

## 2025-07-11 PROCEDURE — 80053 COMPREHEN METABOLIC PANEL: CPT

## 2025-07-11 PROCEDURE — P9016 RBC LEUKOCYTES REDUCED: HCPCS | Performed by: PHYSICIAN ASSISTANT

## 2025-07-11 PROCEDURE — 85025 COMPLETE CBC W/AUTO DIFF WBC: CPT

## 2025-07-11 PROCEDURE — 84100 ASSAY OF PHOSPHORUS: CPT

## 2025-07-11 PROCEDURE — 83735 ASSAY OF MAGNESIUM: CPT

## 2025-07-11 PROCEDURE — 99232 SBSQ HOSP IP/OBS MODERATE 35: CPT | Mod: ,,, | Performed by: PHYSICIAN ASSISTANT

## 2025-07-11 PROCEDURE — A4216 STERILE WATER/SALINE, 10 ML: HCPCS

## 2025-07-11 PROCEDURE — 36415 COLL VENOUS BLD VENIPUNCTURE: CPT

## 2025-07-11 RX ORDER — MUPIROCIN 20 MG/G
OINTMENT TOPICAL 2 TIMES DAILY
Status: DISCONTINUED | OUTPATIENT
Start: 2025-07-11 | End: 2025-07-11 | Stop reason: HOSPADM

## 2025-07-11 RX ORDER — OXYCODONE HYDROCHLORIDE 5 MG/1
5 TABLET ORAL EVERY 6 HOURS PRN
Qty: 20 TABLET | Refills: 0 | Status: SHIPPED | OUTPATIENT
Start: 2025-07-11 | End: 2025-07-11

## 2025-07-11 RX ORDER — OXYCODONE HYDROCHLORIDE 5 MG/1
5 TABLET ORAL EVERY 6 HOURS PRN
Qty: 20 TABLET | Refills: 0 | Status: SHIPPED | OUTPATIENT
Start: 2025-07-11 | End: 2025-07-16

## 2025-07-11 RX ADMIN — AMLODIPINE BESYLATE 2.5 MG: 2.5 TABLET ORAL at 09:07

## 2025-07-11 RX ADMIN — ACETAMINOPHEN 650 MG: 325 TABLET ORAL at 03:07

## 2025-07-11 RX ADMIN — Medication 10 ML: at 01:07

## 2025-07-11 NOTE — HOSPITAL COURSE
Mrs. Griffin was admitted for uterine artery embolization with IR. Hgb 6.8 and 6.4 on repeat. Given 1 unit RBC with hgb now 7.4. Cmp unremarkable. FC removed. Patient voiding by her self. Pain well controlled. No further episodes of heavy vaginal bleeding. Tolerating PO well. No NV or abdominal pain. Pelvic US reviewed by naa Gudino for discharge. Patient updated on plan and workup done. She has follow up with OB scheduled in two weeks and IR to schedule their follow up as well. Follow up with PCP requested.     Plan and medication changes discussed with patient; agreeable to plan. ER precautions were given. All questions were answered to the patient's satisfaction and she was subsequently discharged.      Physical Exam  Gen: in NAD, appears stated age  Neuro: AAOx3, motor, sensory, and strength grossly intact BL  HEENT: EOMI, PERRLA; no JVD appreciated  CVS: RRR, no m/r/g  Resp: lungs CTAB, no w/r/r; no belabored breathing or accessory muscle use appreciated   Abd: NTND, soft to palpation  Extrem: no UE or LE edema BL

## 2025-07-11 NOTE — ASSESSMENT & PLAN NOTE
S/p uterine artery embolization on 7/10 with IR  - PCA pump for pain control dc.   - PO pain prn and IV prn for breath through pain added  - CBC showed hgb of 6.4 given 1 unit RBC with hgb now 7.4  - No further episodes of heavy bleeding or significant pain.   - Likely dc this afternoon if ok with IR.

## 2025-07-11 NOTE — PLAN OF CARE
[facility] - Internal Medicine Telemetry  Discharge Final Note    Primary Care Provider: Marisol Law MD    Expected Discharge Date: 7/11/2025    Patient cleared for discharge from case management standpoint.    Final Discharge Note (most recent)       Final Note - 07/11/25 1607          Final Note    Assessment Type Final Discharge Note     Anticipated Discharge Disposition Home or Self Care     What phone number can be called within the next 1-3 days to see how you are doing after discharge? [phone]     Hospital Resources/Appts/Education Provided Provided patient/caregiver with written discharge plan information;Appointments scheduled and added to AVS        Post-Acute Status    Discharge Delays None known at this time                   Contact Info       Marisol Law MD   Specialty: Family Medicine   Relationship: PCP - General    [address] [place] [address] [place] [address]   Phone: [phone]       Next Steps: Schedule an appointment as soon as possible for a visit in 1 week(s)          Future Appointments   Date Time Provider Department Center   7/21/2025  1:45 PM Jennifer Castle PA-C O[id number] [place] RN  Weekend  - [facility]  Ext. [phone]

## 2025-07-11 NOTE — PLAN OF CARE
[facility] - Internal Medicine Telemetry  Discharge Assessment    Primary Care Provider: Marisol Law MD     Admission Diagnosis: Postmenopausal bleeding [N95.0]  Uterine fibroid [D25.9]    Admission Date: 7/10/2025  Expected Discharge Date: 7/11/2025    Transition of Care Barriers: None    Payor: [employer] MANAGED MEDICARE / Plan: [employer] MEDICARE HMO / Product Type: Capitation /     Extended Emergency Contact Information  Primary Emergency Contact: Jeffery Griffin  Address: [address] [place], [place] [address] [facility]  Home Phone: [phone]  Mobile Phone: [phone]  Relation: Spouse    Discharge Plan A: Home with family  Discharge Plan B: Home with family    [id number] - [place], [place] - [address] [place] [address] AT [address]  [address] [place] [id number]  Phone: [phone] Fax: [phone]    [facility] - [facility] [address] [facility]  [address] [facility] [address]  Phone: [phone] Fax: [phone]    Discharge Assessment (most recent)       BRIEF DISCHARGE ASSESSMENT - 07/11/25 1300          Discharge Planning    Assessment Type Discharge Planning Assessment     Resource/Environmental Concerns none     Support Systems Spouse/significant other     Equipment Currently Used at Home none     Current Living Arrangements home     Patient/Family Anticipates Transition to home with family     Patient/Family Anticipated Services at Transition none     DME Needed Upon Discharge  none     Discharge Plan A Home with family     Discharge Plan B Home with family                 Discharge Plan A and Plan B have been determined by review of patient's clinical status, future medical and therapeutic needs, and coverage/benefits for post-acute care in coordination with multidisciplinary team members.    Eun Grove RN  Weekend  - [facility]  Ext. [phone]

## 2025-07-11 NOTE — CARE UPDATE
Unit MIKI Care Support Interaction      I have reviewed the chart of Polina Griffin who is hospitalized for Uterine fibroid. The patient is currently located in the following unit: IMTA        I have assisted the primary physician in management of the following:      Mcintyre - Placed intra-operatively. Mcintyre removed and pt passed voiding trial.      I have seen and examined the patient and provided the following support:     Patient experience rounds - positive experience reported       Kelly Hurd PA-C  Unit Based MIKI

## 2025-07-11 NOTE — NURSING
Pt is receiving blood product. No complaints or adverse reaction. Set of vitals taken. Light adjusted for pt room. Bed in the lowest position, call light and personal belonging within reach. Will continue care.

## 2025-07-11 NOTE — SUBJECTIVE & OBJECTIVE
Interval History: NAEON. HDS. Patient reports feeling well this morning. Denies any further episodes of heavy vaginal bleeding. Notes slight bleeding with discharge. FC removed and patient voiding by her self without any issues. No NV or abdominal pain. She had mild discomfort last night which resolved with tylenol. Off PCA pump since last night.     Review of Systems   Constitutional:  Negative for chills and fever.   Respiratory:  Negative for chest tightness and shortness of breath.    Cardiovascular:  Negative for chest pain and leg swelling.   Gastrointestinal:  Negative for abdominal pain and nausea.   Neurological:  Negative for dizziness and weakness.     Objective:     Vital Signs (Most Recent):  Temp: 99 °F (37.2 °C) (07/11/25 0738)  Pulse: 92 (07/11/25 0738)  Resp: 18 (07/11/25 0738)  BP: 137/68 (07/11/25 0738)  SpO2: (!) 94 % (07/11/25 0738) Vital Signs (24h Range):  Temp:  [96.9 °F (36.1 °C)-99 °F (37.2 °C)] 99 °F (37.2 °C)  Pulse:  [71-99] 92  Resp:  [8-21] 18  SpO2:  [93 %-100 %] 94 %  BP: ()/(52-72) 137/68     Weight: 73 kg (160 lb 15 oz)  Body mass index is 25.98 kg/m².    Intake/Output Summary (Last 24 hours) at 7/11/2025 0935  Last data filed at 7/11/2025 0639  Gross per 24 hour   Intake --   Output 1550 ml   Net -1550 ml         Physical Exam  Vitals and nursing note reviewed.   Constitutional:       Appearance: She is well-developed.   Eyes:      Pupils: Pupils are equal, round, and reactive to light.   Cardiovascular:      Rate and Rhythm: Normal rate and regular rhythm.   Pulmonary:      Effort: Pulmonary effort is normal.      Breath sounds: Normal breath sounds.   Abdominal:      Palpations: Abdomen is soft.      Tenderness: There is no abdominal tenderness.   Musculoskeletal:         General: No tenderness.   Skin:     General: Skin is warm and dry.   Neurological:      Mental Status: She is alert and oriented to person, place, and time.   Psychiatric:         Behavior: Behavior  normal.               Significant Labs: All pertinent labs within the past 24 hours have been reviewed.  CBC:   Recent Labs   Lab 07/10/25  1402 07/10/25  2228 07/11/25  0506   WBC 4.75 9.19 6.73   HGB 6.8* 6.4* 7.4*   HCT 22.0* 20.0* 22.7*    183 181     CMP:   Recent Labs   Lab 07/11/25  0506      K 3.7      CO2 27   GLU 89   BUN 7*   CREATININE 0.6   CALCIUM 7.6*   PROT 5.3*   ALBUMIN 3.2*   BILITOT 0.7   ALKPHOS 55   AST 13   ALT 8*   ANIONGAP 6*       Significant Imaging: I have reviewed all pertinent imaging results/findings within the past 24 hours.  CT Abdomen Pelvis With IV Contrast Routine Oral Contrast  Addendum: This report was phoned to nurse Gleason in Dr. Manjarrez's office at 1:15  pm.     Electronically signed by: Alexus Mello   Date:    07/03/2025   Time:    13:22  Narrative: EXAMINATION:  CT ABDOMEN PELVIS WITH IV CONTRAST    CLINICAL HISTORY:  Abnormal uterine bleeding (AUB), US inconclusive;Bleeding 1 month post D&C myomectomy; Abnormal uterine and vaginal bleeding, unspecified    TECHNIQUE:  Low dose axial images, sagittal and coronal reformations were obtained from the lung bases to the pubic symphysis following the IV administration of 75 mL of Omnipaque 350 and the oral administration of 1000 cc Omnipaque 9.    COMPARISON:  Pelvic ultrasound 06/17/2025    FINDINGS:  The visualized portions of the lung bases are clear.    4 mm low-attenuation left lobe liver lesion is too small to characterize.  The gallbladder is present.    The spleen, adrenal glands, pancreas are normal.  There is a 5 mm nonobstructing right lower pole renal calculus.  There is a solid lobulated heterogeneously enhancing mass replacing the upper pole of the left kidney, extending into the renal sinus fat medially.  There is a patent renal vein noted just anterior to the mass on series 2, image 32, but there may be extension of tumor into a posterior branch of the renal vein, best appreciated on  the coronal images, series 5, image 45.  The posterior aspect of the tumor crosses the posterior renal fascia, best demonstrated on the sagittal images, series 6, image 47.  The collecting system is distorted and may be invaded.  The IVC is patent.  There is no adenopathy.    The aorta is normal in caliber.    The urinary bladder is unremarkable.    There is active extravasation of IV contrast into the endometrial cavity, particularly at the fundus in this patient with history of continued bleeding status post recent myomectomy.  There are multiple uterine fibroids.  There is no intraperitoneal hemorrhage.    There are diverticula in the distal colon.  There is no bowel obstruction or inflammation.  There is no free intraperitoneal fluid or air.  A 14 mm left ovarian cyst is incidentally noted.  Impression: Active bleeding in the uterus status post recent myomectomy.    Incidentally noted large left renal cell carcinoma with possible involvement of the posterior abdominal wall and a left renal vein branch.    This report was flagged in Epic as abnormal.  Dr. Manjarrez messaged via secure chat at time of dictation.    Electronically signed by: Alexus Mello  Date:    07/03/2025  Time:    13:14

## 2025-07-11 NOTE — PROGRESS NOTES
[facility] - Internal Medicine [facility] Medicine  Progress Note    Patient Name: Polina Griffin  MRN: [id number]  Patient Class: OP- Outpatient Recovery   Admission Date: 7/10/2025  Length of Stay: 0 days  Attending Physician: Monty Bell DO  Primary Care Provider: Marisol Law MD        Subjective     Principal Problem:Uterine fibroid        HPI:  Polina Griffin is a 69 y.o. female with PMHx of uterine fibroid s/p D&C hysteroscopy. She was directly admitted to IR for urgent uterine artery embolization. Patient was seen and examined in MPU following her IR procedure. She notes increased vaginal bleeding following her procedure, MD notified per nursing. Denies any pain, dizziness, or lightheadedness. Also complains of back pain due to prolonged supine position. Denies any abdominal or suprapubic pain. No NV.     Overview/Hospital Course:  Mrs. Griffin was admitted for uterine artery embolization with IR. Hgb 6.8 and 6.4 on repeat. Given 1 unit RBC with hgb now 7.4. Cmp unremarkable.     Interval History: NAEON. HDS. Patient reports feeling well this morning. Denies any further episodes of heavy vaginal bleeding. Notes slight bleeding with discharge. FC removed and patient voiding by her self without any issues. No NV or abdominal pain. She had mild discomfort last night which resolved with tylenol. Off PCA pump since last night.     Review of Systems   Constitutional:  Negative for chills and fever.   Respiratory:  Negative for chest tightness and shortness of breath.    Cardiovascular:  Negative for chest pain and leg swelling.   Gastrointestinal:  Negative for abdominal pain and nausea.   Neurological:  Negative for dizziness and weakness.     Objective:     Vital Signs (Most Recent):  Temp: 99 °F (37.2 °C) (07/11/25 0738)  Pulse: 92 (07/11/25 0738)  Resp: 18 (07/11/25 0738)  BP: 137/68 (07/11/25 0738)  SpO2: (!) 94 % (07/11/25 0738) Vital Signs (24h Range):  Temp:  [96.9 °F (36.1 °C)-99 °F (37.2  °C)] 99 °F (37.2 °C)  Pulse:  [71-99] 92  Resp:  [8-21] 18  SpO2:  [93 %-100 %] 94 %  BP: ()/(52-72) 137/68     Weight: 73 kg (160 lb 15 oz)  Body mass index is 25.98 kg/m².    Intake/Output Summary (Last 24 hours) at 7/11/2025 0999  Last data filed at 7/11/2025 0639  Gross per 24 hour   Intake --   Output 1550 ml   Net -1550 ml         Physical Exam  Vitals and nursing note reviewed.   Constitutional:       Appearance: She is well-developed.   Eyes:      Pupils: Pupils are equal, round, and reactive to light.   Cardiovascular:      Rate and Rhythm: Normal rate and regular rhythm.   Pulmonary:      Effort: Pulmonary effort is normal.      Breath sounds: Normal breath sounds.   Abdominal:      Palpations: Abdomen is soft.      Tenderness: There is no abdominal tenderness.   Musculoskeletal:         General: No tenderness.   Skin:     General: Skin is warm and dry.   Neurological:      Mental Status: She is alert and oriented to person, place, and time.   Psychiatric:         Behavior: Behavior normal.               Significant Labs: All pertinent labs within the past 24 hours have been reviewed.  CBC:   Recent Labs   Lab 07/10/25  1402 07/10/25  2228 07/11/25  0506   WBC 4.75 9.19 6.73   HGB 6.8* 6.4* 7.4*   HCT 22.0* 20.0* 22.7*    183 181     CMP:   Recent Labs   Lab 07/11/25  0506      K 3.7      CO2 27   GLU 89   BUN 7*   CREATININE 0.6   CALCIUM 7.6*   PROT 5.3*   ALBUMIN 3.2*   BILITOT 0.7   ALKPHOS 55   AST 13   ALT 8*   ANIONGAP 6*       Significant Imaging: I have reviewed all pertinent imaging results/findings within the past 24 hours.  CT Abdomen Pelvis With IV Contrast Routine Oral Contrast  Addendum: This report was phoned to nurse Gleason in Dr. Manjarrez's office at 1:15  pm.     Electronically signed by: Alexus Mello   Date:    07/03/2025   Time:    13:22  Narrative: EXAMINATION:  CT ABDOMEN PELVIS WITH IV CONTRAST    CLINICAL HISTORY:  Abnormal uterine bleeding  (AUB), US inconclusive;Bleeding 1 month post D&C myomectomy; Abnormal uterine and vaginal bleeding, unspecified    TECHNIQUE:  Low dose axial images, sagittal and coronal reformations were obtained from the lung bases to the pubic symphysis following the IV administration of 75 mL of Omnipaque 350 and the oral administration of 1000 cc Omnipaque 9.    COMPARISON:  Pelvic ultrasound 06/17/2025    FINDINGS:  The visualized portions of the lung bases are clear.    4 mm low-attenuation left lobe liver lesion is too small to characterize.  The gallbladder is present.    The spleen, adrenal glands, pancreas are normal.  There is a 5 mm nonobstructing right lower pole renal calculus.  There is a solid lobulated heterogeneously enhancing mass replacing the upper pole of the left kidney, extending into the renal sinus fat medially.  There is a patent renal vein noted just anterior to the mass on series 2, image 32, but there may be extension of tumor into a posterior branch of the renal vein, best appreciated on the coronal images, series 5, image 45.  The posterior aspect of the tumor crosses the posterior renal fascia, best demonstrated on the sagittal images, series 6, image 47.  The collecting system is distorted and may be invaded.  The IVC is patent.  There is no adenopathy.    The aorta is normal in caliber.    The urinary bladder is unremarkable.    There is active extravasation of IV contrast into the endometrial cavity, particularly at the fundus in this patient with history of continued bleeding status post recent myomectomy.  There are multiple uterine fibroids.  There is no intraperitoneal hemorrhage.    There are diverticula in the distal colon.  There is no bowel obstruction or inflammation.  There is no free intraperitoneal fluid or air.  A 14 mm left ovarian cyst is incidentally noted.  Impression: Active bleeding in the uterus status post recent myomectomy.    Incidentally noted large left renal cell  carcinoma with possible involvement of the posterior abdominal wall and a left renal vein branch.    This report was flagged in Epic as abnormal.  Dr. Manjarrez messaged via secure chat at time of dictation.    Electronically signed by: Alexus Mello  Date:    07/03/2025  Time:    13:14          Assessment & Plan  Uterine fibroid  Postmenopausal bleeding  S/p uterine artery embolization on 7/10 with IR  - PCA pump for pain control dc.   - PO pain prn and IV prn for breath through pain added  - CBC showed hgb of 6.4 given 1 unit RBC with hgb now 7.4  - No further episodes of heavy bleeding or significant pain.   - Likely dc this afternoon if ok with IR.        Hypertension  Patient's blood pressure range in the last 24 hours was: BP  Min: 99/64  Max: 158/70.The patient's inpatient anti-hypertensive regimen is listed below:  Current Antihypertensives  amLODIPine tablet 2.5 mg, Daily, Oral    Plan  - BP is controlled, no changes needed to their regimen  VTE Risk Mitigation (From admission, onward)           Ordered     IP VTE LOW RISK PATIENT  Once         07/10/25 0700     Place KATE hose  Until discontinued         07/10/25 0700                    Discharge Planning   DOC: 7/11/2025     Code Status: Full Code   Medical Readiness for Discharge Date:                                  Jose G Bowser PA-C  Department of Hospital Medicine   [facility] - Internal Medicine Telemetry

## 2025-07-11 NOTE — ASSESSMENT & PLAN NOTE
Patient's blood pressure range in the last 24 hours was: BP  Min: 99/64  Max: 158/70.The patient's inpatient anti-hypertensive regimen is listed below:  Current Antihypertensives  amLODIPine tablet 2.5 mg, Daily, Oral    Plan  - BP is controlled, no changes needed to their regimen

## 2025-07-11 NOTE — PLAN OF CARE
Patient is medically ready to discharge. Follow up with PCP, message sent to CHW team 1 to arrange.    Eun Grove RN  [facility]  - [facility] [place] [facility]  Ext. [phone]

## 2025-07-11 NOTE — NURSING
Removed pt benavides, place in red bag. Pt has mild bleeding from her vaginal. Pt has no complaints of burning or pain.  Stuart and Tegaderm bandage to right side of groin, charted out put.

## 2025-07-11 NOTE — NURSING
Pt is AAOX4, pt educated on discharge meds and follow up appointments by FLORENTINO , REGINALDOS given to pt.  Pt voiced understanding. Pt transported via wheelchair accompanied by family.

## 2025-07-11 NOTE — PROGRESS NOTES
Interventional Radiology   Progress Note      SUBJECTIVE:     History of Present Illness:  Polina Griffin is a 69 y.o. female with a PMHx of uterine fibroids s/p D & C hysteroscopy for excision of a small submucosal fibroid on 6/10/25 c/b suspected intrauterine PSA formation who was admitted for post op observation on 7/10/25 following outpatient UAE procedure which was performed same day. UAE performed to address pt's intrauterine PSA. UAE revealed supply to the PSA from the L uterine artery, a cervical branch, and the R ovarian artery, all of which were embolized. Pt tolerated this procedure well.     Of note, H/H noted to be 6.8/22.20 following procedure, down from 12.5/40.5 on 7/2. Suspect drop in H/H related to blood losses which occurred prior to procedure as pt was experiencing ongoing vaginal bleeding requiring ED visit prior to procedure. Pt remains HDS. 1 u pRBC transfused, H/H now improved to 7.4/22.7.    Interval History:  Pt doing well this AM. She currently denies pain, has not required any PRN pain meds since yesterday evening. She also denies N/V, pain/swelling at R groin access site, fever/chills and purulent vaginal discharge. She is tolerating PO, urinating w/o difficulty, and ambulating w/o difficulty.    She had some light vaginal bleeding overnight, denies vaginal bleeding this morning. She remains HDS. H/H improved to 7.4/22.7 this AM after receiving 1 u pRBC. Pelvic US ordered to reassess her intrauterine PSA.      Review of Systems   Constitutional:  Negative for chills and fever.   Gastrointestinal:  Positive for abdominal pain.   Genitourinary:         Vaginal bleeding       Scheduled Meds:   amLODIPine  2.5 mg Oral Daily    sodium chloride 0.9%  10 mL Intravenous Q8H     Continuous Infusions:   0.9% NaCl   Intravenous Continuous         PRN Meds:  Current Facility-Administered Medications:     0.9%  NaCl infusion (for blood administration), , Intravenous, Q24H PRN    acetaminophen, 650 mg,  Oral, Q4H PRN    albuterol-ipratropium, 3 mL, Nebulization, Q6H PRN    aluminum-magnesium hydroxide-simethicone, 30 mL, Oral, QID PRN    dextrose 50%, 12.5 g, Intravenous, PRN    dextrose 50%, 25 g, Intravenous, PRN    glucagon (human recombinant), 1 mg, Intramuscular, PRN    glucose, 16 g, Oral, PRN    glucose, 24 g, Oral, PRN    HYDROmorphone, 0.2 mg, Intravenous, Q6H PRN    melatonin, 6 mg, Oral, Nightly PRN    naloxone, 0.02 mg, Intravenous, PRN    ondansetron, 8 mg, Oral, Q8H PRN    oxyCODONE, 5 mg, Oral, Q4H PRN    polyethylene glycol, 17 g, Oral, Daily PRN    prochlorperazine, 5 mg, Intravenous, Q6H PRN    simethicone, 1 tablet, Oral, QID PRN    Review of patient's allergies indicates:   Allergen Reactions    Amoxicillin Rash       Past Medical History:   Diagnosis Date    Abnormal Pap smear of cervix 2011, 2013    LEEP    Anemia     in her 20's    Esophageal reflux     Fibroids     Fibromatosis of plantar fascia     Hypertension     Insomnia     Irritable bowel syndrome (IBS)     Ocular migraine     Renal disorder     Tinnitus     Urge incontinence      Past Surgical History:   Procedure Laterality Date    APPENDECTOMY  2008    CATARACT EXTRACTION Bilateral 2021    CERVICAL BIOPSY  W/ LOOP ELECTRODE EXCISION  2011    COLONOSCOPY N/A 12/11/2017    Procedure: COLONOSCOPY;  Surgeon: Angel Florez MD;  Location: [address]);  Service: Endoscopy;  Laterality: N/A;    COLONOSCOPY N/A 12/07/2022    Procedure: COLONOSCOPY;  Surgeon: Elle Armenta MD;  Location: [facility] ENDO;  Service: Endoscopy;  Laterality: N/A;    COLONOSCOPY N/A 11/20/2024    Procedure: COLONOSCOPY;  Surgeon: Elle Armenta MD;  Location: [facility] ENDO;  Service: Endoscopy;  Laterality: N/A;  aly/rectal bleeding    DILATION AND CURETTAGE OF UTERUS  06/10/2025    ENDOMETRIAL BIOPSY  2010    NASAL TURBINATE REDUCTION  2015    Skin Cancer Removal Right     SUPERFICIAL KERATECTOMY Right 07/2021    TUBAL LIGATION Bilateral 1980     UPPER GASTROINTESTINAL ENDOSCOPY      WISDOM TOOTH EXTRACTION Bilateral 1973     Family History   Problem Relation Name Age of Onset    COPD Mother      Hyperlipidemia Mother      Hypertension Mother      Coronary artery disease Mother      Stroke Father      Hypertension Father      Coronary artery disease Father      Heart attack Father      Hyperlipidemia Father      Cancer Father      Prostate cancer Father      Diabetes Brother      Ovarian cancer Neg Hx      Colon cancer Neg Hx      Breast cancer Neg Hx      Crohn's disease Neg Hx      Ulcerative colitis Neg Hx      Stomach cancer Neg Hx      Esophageal cancer Neg Hx      Irritable bowel syndrome Neg Hx      Celiac disease Neg Hx      Rectal cancer Neg Hx      Colon polyps Neg Hx       Social History[1]    OBJECTIVE:     Vital Signs (Most Recent)  Temp: 99 °F (37.2 °C) (07/11/25 0738)  Pulse: 92 (07/11/25 0738)  Resp: 18 (07/11/25 0738)  BP: 137/68 (07/11/25 0738)  SpO2: (!) 94 % (07/11/25 0738)    Physical Exam:  Physical Exam  Vitals and nursing note reviewed.   Constitutional:       General: She is not in acute distress.     Appearance: She is not ill-appearing.   HENT:      Head: Normocephalic and atraumatic.      Right Ear: External ear normal.      Left Ear: External ear normal.   Eyes:      Extraocular Movements: Extraocular movements intact.      Conjunctiva/sclera: Conjunctivae normal.      Pupils: Pupils are equal, round, and reactive to light.   Cardiovascular:      Rate and Rhythm: Normal rate.   Pulmonary:      Effort: Pulmonary effort is normal. No respiratory distress.   Abdominal:      General: Abdomen is flat.   Genitourinary:     Comments: R groin access site with dressing c/d/I; no TTP, swelling, bruising or bounding pulsatility  Skin:     General: Skin is warm and dry.      Coloration: Skin is not jaundiced.   Neurological:      General: No focal deficit present.      Mental Status: She is alert and oriented to person, place, and time.  "  Psychiatric:         Mood and Affect: Mood normal.         Behavior: Behavior normal.         Thought Content: Thought content normal.         Judgment: Judgment normal.         Laboratory  I have reviewed all pertinent lab results within the past 24 hours.  CBC:   Recent Labs   Lab 07/11/25  0506   WBC 6.73   RBC 2.56*   HGB 7.4*   HCT 22.7*      MCV 89   MCH 28.9   MCHC 32.6     BMP:   Recent Labs   Lab 07/11/25  0506   GLU 89      K 3.7      CO2 27   BUN 7*   CREATININE 0.6   CALCIUM 7.6*   MG 2.0     CMP:   Recent Labs   Lab 07/11/25  0506   GLU 89   CALCIUM 7.6*   ALBUMIN 3.2*   PROT 5.3*      K 3.7   CO2 27      BUN 7*   CREATININE 0.6   ALKPHOS 55   ALT 8*   AST 13   BILITOT 0.7     LFTs:   Recent Labs   Lab 07/11/25  0506   ALT 8*   AST 13   ALKPHOS 55   BILITOT 0.7   PROT 5.3*   ALBUMIN 3.2*     Coagulation: No results for input(s): "LABPROT", "INR", "APTT" in the last 168 hours.  Microbiology Results (last 7 days)       ** No results found for the last 168 hours. **            Imaging:  IR Post-Procedure Note     Pre Op Diagnosis: uterine AVM/PSA     Post Op Diagnosis: same     Procedure: Uterine artery embolization     Procedure performed by: Christine Rojas MD     Written Informed Consent Obtained: Yes     Specimen Removed:  No     Estimated Blood Loss: Minimal     Findings: Local anesthesia and moderate sedation were used.     RCFA access and pelvic angiography was performed.      A microcatheter was inserted into the left uterine artery and cross-filling to pseudoaneurysm was noted. A cervical branch was coiled, and left uterine artery was embolized with Gelfoam.      Right uterine artery was catheterized and did not supply pseudoaneurysm. R ovarian artery was catheterized and supplied large pseudoaneurysm, successfully embolized with Gelfoam.      The patient tolerated the procedure well and there were no complications.  Please see Imaging report for further details.   "   Plan:  Overnight admission for pain management (PCA pump)  Ok to discharge in AM when pain is controlled, patient able to void and afebrile  CTA A/P with contrast in one week  Encourage fluid intake  4. Return to normal activity in 4-7 days    6. Expect possible occasional vaginal discharge for 1- 3 weeks  7. Plan for follow up in 3 weeks, 3 months in IR clinic (virtually)            Christine Rojas MD  Interventional Radiology    ASSESSMENT/PLAN:     Assessment:  69 y.o. female with a PMHx of uterine fibroids s/p D & C hysteroscopy for excision of a small submucosal fibroid on 6/10/25 c/b suspected intrauterine PSA formation is s/p IR UAE for management of her intrauterine PSA on 7/10/25.    Plan:  Pt is progressing well post op- pain controlled, nausea improving, tolerating PO intake, ambulating without difficulty  R groin access site without signs of PSA. Educated pt on symptoms to look out for- severe TTP, bruising, swellnig, pulsatility  Educated pt on post embolization symptoms which are expected and should improve with time- lower abdominal pain/cramping, N/V, low grade fever, malaise, spotting, vaginal discharge, etc  Educated pt on symptoms to be concerned about- fever > 101 F, foul smelling vaginal discharge, pain/swelling/bruising at R groin access site, etc. Instructed pt to call our office if she experiences these symptoms  Activity restrictions:  Return to normal activity in 4-7 days  Avoid tampons or intercourse for 1 week  Avoid lifting anything heavier than 5 lbs for 10 days  Pelvic US pending- will follow results  Plan for CTA a/p with IV contrast to reassess intrauterine PSA in 1 week  Plan for follow up in IR clinic (virtually) in 3 weeks and then in 3 months   Pt is cleared for discharge from an IR standpoint pending pelvic US results. Please contact with questions via [employer] secure chat or spectra: [phone]      Nini Shi PA-C  Interventional Radiology  Spectra: [id number]         [1]   Social  History  Tobacco Use    Smoking status: Never    Smokeless tobacco: Never   Substance Use Topics    Alcohol use: No    Drug use: No

## 2025-07-12 NOTE — DISCHARGE SUMMARY
[facility] - Internal Medicine [facility] Medicine  Discharge Summary      Patient Name: Polina Griffin  MRN: [id number]  MARGO: [id number]  Patient Class: OP- Outpatient Recovery  Admission Date: 7/10/2025  Hospital Length of Stay: 0 days  Discharge Date and Time: 7/11/2025  4:52 PM  Attending Physician: Jenny att. providers found   Discharging Provider: Jose G Bowser PA-C  Primary Care Provider: Marisol Law MD  [facility] Medicine Team: [facility] Jose G Bowser PA-C  Primary Care Team: [facility]    HPI:   Polina Griffin is a 69 y.o. female with PMHx of uterine fibroid s/p D&C hysteroscopy. She was directly admitted to IR for urgent uterine artery embolization. Patient was seen and examined in MPU following her IR procedure. She notes increased vaginal bleeding following her procedure, MD notified per nursing. Denies any pain, dizziness, or lightheadedness. Also complains of back pain due to prolonged supine position. Denies any abdominal or suprapubic pain. No NV.     * No procedures listed *      Hospital Course:   Mrs. Griffin was admitted for uterine artery embolization with IR. Hgb 6.8 and 6.4 on repeat. Given 1 unit RBC with hgb now 7.4. Cmp unremarkable. FC removed. Patient voiding by her self. Pain well controlled. No further episodes of heavy vaginal bleeding. Tolerating PO well. No NV or abdominal pain. Pelvic US reviewed by naa Gudino for discharge. Patient updated on plan and workup done. She has follow up with OB scheduled in two weeks and IR to schedule their follow up as well. Follow up with PCP requested.     Plan and medication changes discussed with patient; agreeable to plan. ER precautions were given. All questions were answered to the patient's satisfaction and she was subsequently discharged.      Physical Exam  Gen: in NAD, appears stated age  Neuro: AAOx3, motor, sensory, and strength grossly intact BL  HEENT: EOMI, PERRLA; no JVD appreciated  CVS: RRR, no  m/r/g  Resp: lungs CTAB, no w/r/r; no belabored breathing or accessory muscle use appreciated   Abd: NTND, soft to palpation  Extrem: no UE or LE edema BL       Goals of Care Treatment Preferences:  Code Status: Full Code         Consults:     Assessment & Plan    Final Active Diagnoses:    Diagnosis Date Noted POA    PRINCIPAL PROBLEM:  Uterine fibroid [D25.9] 07/10/2025 Yes    Postmenopausal bleeding [N95.0] 11/01/2017 Yes    Hypertension [I10]  Yes      Problems Resolved During this Admission:       Discharged Condition: good    Disposition: Home or Self Care    Follow Up:   Follow-up Information       Marisol Law MD. Schedule an appointment as soon as possible for a visit in 1 week(s).    Specialty: Family Medicine  Contact information:  [address] [place] [id number] [phone]                           Patient Instructions:      CTA Acute GI Bleed, Abdomen and Pelvis   Standing Status: Future Standing Exp. Date: 07/11/26     Order Specific Question Answer Comments   Is the patient allergic to iodine contrast? No    Does this patient have impaired renal function? No    Diabetes? No    May the Radiologist modify the order per protocol to meet the clinical needs of the patient? Yes      Notify your health care provider if you experience any of the following:  severe uncontrolled pain     Notify your health care provider if you experience any of the following:  redness, tenderness, or signs of infection (pain, swelling, redness, odor or green/yellow discharge around incision site)     Notify your health care provider if you experience any of the following:  increased confusion or weakness     Notify your health care provider if you experience any of the following:  persistent dizziness, light-headedness, or visual disturbances     Activity as tolerated       Significant Diagnostic Studies: Labs: CMP   Recent Labs   Lab 07/11/25  0506      K 3.7      CO2 27   GLU 89   BUN 7*    CREATININE 0.6   CALCIUM 7.6*   PROT 5.3*   ALBUMIN 3.2*   BILITOT 0.7   ALKPHOS 55   AST 13   ALT 8*   ANIONGAP 6*    and CBC   Recent Labs   Lab 07/10/25  2228 07/11/25  0506   WBC 9.19 6.73   HGB 6.4* 7.4*   HCT 20.0* 22.7*    181       Pending Diagnostic Studies:       None           Medications:  Reconciled Home Medications:      Medication List        START taking these medications      oxyCODONE 5 MG immediate release tablet  Commonly known as: ROXICODONE  Take 1 tablet (5 mg total) by mouth every 6 (six) hours as needed for Pain.            CONTINUE taking these medications      AIRBORNE ELDERBERRY ORAL  1 tab, Chewed, Daily, 0 Refill(s)     BENEFIBER (GUAR GUM) ORAL  Take by mouth once daily. 3 Gummies a day     CITRACAL + D3 (CALCIUM PHOS) ORAL  Take by mouth.     coenzyme Q10 100 mg capsule  Take 100 mg by mouth once daily.     ferrous sulfate 325 (65 FE) MG EC tablet  Take 1 tablet (325 mg total) by mouth once daily.     fluticasone propionate 50 mcg/actuation nasal spray  Commonly known as: FLONASE  1 spray by Each Nostril route once daily.     multivitamin capsule  Take 1 capsule by mouth once daily.     NORVASC 2.5 mg tablet  Generic drug: amLODIPine  2.5 mg.     ondansetron 4 MG Tbdl  Commonly known as: ZOFRAN-ODT  Take 1 tablet (4 mg total) by mouth every 6 (six) hours as needed (nausea).     * tranexamic acid 650 mg tablet  Commonly known as: LYSTEDA  1,300 mg.     * tranexamic acid 650 mg tablet  Commonly known as: LYSTEDA  Take 2 tablets (1,300 mg total) by mouth 3 (three) times daily. for 5 days     vit C,M-Dv-bagpj-lutein-zeaxan 250-90-40-1 mg Cap  Commonly known as: PRESERVISION AREDS 2  Take 1 tablet by mouth once daily.           * This list has 2 medication(s) that are the same as other medications prescribed for you. Read the directions carefully, and ask your doctor or other care provider to review them with you.                  Indwelling Lines/Drains at time of discharge:    Lines/Drains/Airways       None                       Time spent on the discharge of patient: 37 minutes         Jose G Bowser PA-C  Department of Hospital Medicine  [facility] - Internal Medicine Telemetry   ROS mainly obtained from son. Patient with dementia and  difficulty understanding    General: denies fever, chills  HENT: denies nasal congestion, rhinorrhea  Eyes: denies visual changes, blurred vision  CV: CP, denies palpitations  Resp: SOB, PRODUCTIVE COUGH  Abdominal: denies nausea, vomiting, diarrhea, abdominal pain  : denies urinary pain or discharge  MSK: RLE SWELLING, B/L KNEE PAIN, denies muscle aches  Neuro: DIZZINESS, denies headaches, numbness, tingling  Skin: denies rashes, bruises

## 2025-07-14 DIAGNOSIS — N28.89 RENAL MASS: Primary | ICD-10-CM

## 2025-07-17 ENCOUNTER — HOSPITAL ENCOUNTER (OUTPATIENT)
Dept: RADIOLOGY | Facility: HOSPITAL | Age: 70
Discharge: HOME | End: 2025-07-17
Attending: UROLOGY
Payer: MEDICARE

## 2025-07-17 ENCOUNTER — HOSPITAL ENCOUNTER (OUTPATIENT)
Dept: RADIOLOGY | Facility: HOSPITAL | Age: 70
Discharge: HOME | End: 2025-07-17
Attending: STUDENT IN AN ORGANIZED HEALTH CARE EDUCATION/TRAINING PROGRAM
Payer: MEDICARE

## 2025-07-17 ENCOUNTER — TELEPHONE (OUTPATIENT)
Dept: HEMATOLOGY/ONCOLOGY | Facility: CLINIC | Age: 70
End: 2025-07-17
Payer: MEDICARE

## 2025-07-17 ENCOUNTER — TELEPHONE (OUTPATIENT)
Dept: UROLOGY | Facility: CLINIC | Age: 70
End: 2025-07-17
Payer: MEDICARE

## 2025-07-17 DIAGNOSIS — N95.0 POSTMENOPAUSAL BLEEDING: ICD-10-CM

## 2025-07-17 DIAGNOSIS — N28.89 RENAL MASS: Primary | ICD-10-CM

## 2025-07-17 DIAGNOSIS — N28.89 RENAL MASS: ICD-10-CM

## 2025-07-17 PROCEDURE — 74174 CTA ABD&PLVS W/CONTRAST: CPT | Mod: TC

## 2025-07-17 PROCEDURE — 25500020 PHARM REV CODE 255: Performed by: STUDENT IN AN ORGANIZED HEALTH CARE EDUCATION/TRAINING PROGRAM

## 2025-07-17 PROCEDURE — 74174 CTA ABD&PLVS W/CONTRAST: CPT | Mod: 26,,, | Performed by: RADIOLOGY

## 2025-07-17 PROCEDURE — 71250 CT THORAX DX C-: CPT | Mod: 26,,, | Performed by: RADIOLOGY

## 2025-07-17 PROCEDURE — 71250 CT THORAX DX C-: CPT | Mod: TC

## 2025-07-17 RX ADMIN — IOHEXOL 100 ML: 350 INJECTION, SOLUTION INTRAVENOUS at 09:07

## 2025-07-17 NOTE — NURSING
Nurse navigator spoke with patient to coordinate appt with Dr. Suazo on 7/22/25.  Patient states understanding.  All questions answered and contact info given for any future questions.  Oncology Navigation   Intake  Cancer Type:   Type of Referral: External  Multiple appointments: No     Treatment     Surgical Oncologist: Marcell Suazo                          Acuity      Follow Up  No follow-ups on file.

## 2025-07-18 DIAGNOSIS — N95.0 POSTMENOPAUSAL BLEEDING: Primary | ICD-10-CM

## 2025-07-22 ENCOUNTER — OFFICE VISIT (OUTPATIENT)
Dept: UROLOGY | Facility: CLINIC | Age: 70
End: 2025-07-22
Payer: MEDICARE

## 2025-07-22 VITALS
SYSTOLIC BLOOD PRESSURE: 159 MMHG | HEIGHT: 66 IN | WEIGHT: 156.06 LBS | DIASTOLIC BLOOD PRESSURE: 75 MMHG | HEART RATE: 94 BPM | BODY MASS INDEX: 25.08 KG/M2

## 2025-07-22 DIAGNOSIS — I10 PRIMARY HYPERTENSION: ICD-10-CM

## 2025-07-22 DIAGNOSIS — N28.89 OTHER SPECIFIED DISORDERS OF KIDNEY AND URETER: Primary | ICD-10-CM

## 2025-07-22 DIAGNOSIS — N28.89 RENAL MASS, LEFT: ICD-10-CM

## 2025-07-22 PROCEDURE — 1159F MED LIST DOCD IN RCRD: CPT | Mod: CPTII,S$GLB,, | Performed by: UROLOGY

## 2025-07-22 PROCEDURE — 3288F FALL RISK ASSESSMENT DOCD: CPT | Mod: CPTII,S$GLB,, | Performed by: UROLOGY

## 2025-07-22 PROCEDURE — 1111F DSCHRG MED/CURRENT MED MERGE: CPT | Mod: CPTII,S$GLB,, | Performed by: UROLOGY

## 2025-07-22 PROCEDURE — G2211 COMPLEX E/M VISIT ADD ON: HCPCS | Mod: S$GLB,,, | Performed by: UROLOGY

## 2025-07-22 PROCEDURE — 99999 PR PBB SHADOW E&M-EST. PATIENT-LVL V: CPT | Mod: PBBFAC,,, | Performed by: UROLOGY

## 2025-07-22 PROCEDURE — 99205 OFFICE O/P NEW HI 60 MIN: CPT | Mod: S$GLB,,, | Performed by: UROLOGY

## 2025-07-22 PROCEDURE — 1101F PT FALLS ASSESS-DOCD LE1/YR: CPT | Mod: CPTII,S$GLB,, | Performed by: UROLOGY

## 2025-07-22 PROCEDURE — 3008F BODY MASS INDEX DOCD: CPT | Mod: CPTII,S$GLB,, | Performed by: UROLOGY

## 2025-07-22 PROCEDURE — 3077F SYST BP >= 140 MM HG: CPT | Mod: CPTII,S$GLB,, | Performed by: UROLOGY

## 2025-07-22 PROCEDURE — 1160F RVW MEDS BY RX/DR IN RCRD: CPT | Mod: CPTII,S$GLB,, | Performed by: UROLOGY

## 2025-07-22 PROCEDURE — 3078F DIAST BP <80 MM HG: CPT | Mod: CPTII,S$GLB,, | Performed by: UROLOGY

## 2025-07-22 PROCEDURE — 1126F AMNT PAIN NOTED NONE PRSNT: CPT | Mod: CPTII,S$GLB,, | Performed by: UROLOGY

## 2025-07-22 NOTE — PROGRESS NOTES
Subjective:       Patient ID: Polina Griffin is a 69 y.o. female.    Chief Complaint:  left renal mass      History of Present Illness  HPI  Patient is a 69 y.o. female who is new to our clinic and referred by their ob/gyn, Dr. Manjarrez for evaluation of a large left renal mass.     History of Present Illness    Patient presents today for evaluation of a kidney mass found incidentally on CT. She underwent a D&C myomectomy on June 10th, followed by significant heavy vaginal bleeding one week post-procedure. She was diagnosed with an arteriovenous malformation (AVM) and subsequently underwent uterine embolization on July 10th to address the bleeding complications. She experienced pink-colored urine in January and sought primary care evaluation. Blood test at that time showed no significant findings. She denies current urinary discoloration or additional urinary symptoms. She reports blood pressure consistently fluctuating between low and high readings. Her oncologist suggested these fluctuations might be related to an underlying medical condition. She continues Amlodipine 5 mg for management. Laparoscopic appendectomy in 2008 Father with prostate cancer and aunt with lung cancer. Never smoker with history of significant second-hand smoke exposure from living with smokers in the past.           Review of Systems  Review of Systems  All other systems reviewed and negative except pertinent positives noted in HPI.       Objective:     Physical Exam  Constitutional:       Appearance: Normal appearance. She is well-developed. She is not toxic-appearing.   HENT:      Head: Normocephalic.   Cardiovascular:      Rate and Rhythm: Normal rate.      Pulses: Normal pulses.   Pulmonary:      Effort: Pulmonary effort is normal. No tachypnea, accessory muscle usage or respiratory distress.   Abdominal:      General: There is no distension.      Palpations: Abdomen is soft. There is no fluid wave or mass.      Tenderness: There is  no abdominal tenderness.      Hernia: No hernia is present.      Comments: Liver/spleen non-palpable   Musculoskeletal:         General: Normal range of motion.   Skin:     Findings: No bruising or rash.   Neurological:      Mental Status: She is alert and oriented to person, place, and time.   Psychiatric:         Speech: Speech normal.         Behavior: Behavior normal. Behavior is cooperative.         Thought Content: Thought content normal.         Lab Review  Lab Results   Component Value Date    COLORU Yellow 01/06/2025    SPECGRAV 1.020 01/06/2025    PHUR 7.0 01/06/2025    NITRITE Negative 01/06/2025    KETONESU Negative 01/06/2025    UROBILINOGEN Negative 01/06/2025         Assessment:        1. Other specified disorders of kidney and ureter    2. Renal mass, left    3. Primary hypertension            Plan:     Other specified disorders of kidney and ureter  -     MRI Abdomen W WO Contrast; Future; Expected date: 07/22/2025    Renal mass, left  -     Ambulatory referral/consult to Urology  -     Case Request Operating Room: DV5 ROBOTIC NEPHRECTOMY, RADICAL    Primary hypertension     Long talk about renal mass and it's management.  Reviewed images.Discussed options including active surveillance, biopsy, minimally invasive techniques including HFRA, cryo. Discussed open and laparascopic surgical approaches. Discussed partial and radical nephrectomy. Discussed surgery preparation, surgery, recuperation, recovery, exercise restrictions. Discussed risks, benefits, and complications. Answered questions and addressed their concerns.    -I have explained the risk, benefits, and alternatives of the procedure in detail.  The risks including but not limited to bleeding, injury to adjacent structures including the spleen, liver, lung, pancreas, colon and intestines, blood vessels in the abdomen including the renal artery or renal vein, or need for conversion to open nephrectomy were explained to the patient in depth.  The patient voices understanding and all questions have been answered. The patient agrees to proceed as planned with a left robotic nephrectomy    2. 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  - Possible extension into renal vein and posterior abdominal wall involvement, suggesting locally advanced disease.  - Surgical intervention recommended as best chance for potential cure.  - Radical nephrectomy indicated due to size and extent of mass; kidney preservation not feasible.  - Considered robotic approach, though open surgery may be necessary depending on local invasion.  - Staging likely stage III if renal vein invasion confirmed; stage IV if pulmonary mets present.  - MRI being considered to better evaluate vascular involvement, though may proceed without if significant delay.  - Will consult with medical oncology colleague regarding management approach and need for biopsy.  - Explained presumptive diagnosis of renal cell carcinoma based on imaging characteristics.  - Discussed staging of renal cancer and implications for treatment approach.  - Reviewed potential need for adjuvant immunotherapy depending on final pathology.    ABNORMAL LUNG FINDINGS:  - Multiple small pulmonary nodules noted, too small to biopsy or definitively characterize.    ARTERIOVENOUS MALFORMATION:  - Clarified that uterine AVM likely unrelated to  renal mass.    FOLLOW-UP:  - Follow up to discuss surgery dates and pre-op visit scheduling.       This note was generated with the assistance of ambient listening technology. Verbal consent was obtained by the patient and accompanying visitor(s) for the recording of patient appointment to facilitate this note. I attest to having reviewed and edited the generated note for accuracy, though some syntax or spelling errors may persist. Please contact the author of this note for any clarification.    - code applied: patient requires or will require a continuous, longitudinal, and active collaborative plan of care related to this patient's health condition, renal mass (presumed RCC) --the management of which requires the direction of a practitioner with specialized clinical knowledge, skill, and expertise.

## 2025-07-23 NOTE — ANESTHESIA PAT ROS NOTE
07/23/2025  Polina Griffin is a 69 y.o., female.      Pre-op Assessment    I have reviewed the NPO Status.   I have reviewed the Medications.     Review of Systems  Anesthesia Hx:  No problems with previous Anesthesia   History of prior surgery of interest to airway management or planning:  Previous anesthesia: General 11/20/2024 Colonoscopy with general anesthesia.  Procedure performed at an [facility].       Denies Family Hx of Anesthesia complications.    Denies Personal Hx of Anesthesia complications.                    Social:  Non-Smoker, No Alcohol Use       Hematology/Oncology:       -- Anemia:                              Oncology Comments: Renal Mass      EENT/Dental:  EENT/Dental Normal           Cardiovascular:     Hypertension, well controlled   Denies MI.        Denies Angina.            Functional Capacity good / => 4 METS                         Pulmonary:       Denies Shortness of breath.   Denies Sleep Apnea. (snoring)                Renal/:  Chronic Renal Disease                Hepatic/GI:     GERD                Musculoskeletal:  Arthritis               Neurological:  Denies TIA.  Denies CVA.    Denies Seizures.     Dx of Headaches, Migraine Headache                           Endocrine:  Endocrine Normal            Psych:  Psychiatric Normal                         Anesthesia Assessment: Preoperative EQUATION    Planned Procedure: Procedure(s) (LRB):  XI ROBOTIC NEPHRECTOMY, RADICAL (Left)  Requested Anesthesia Type:General/Regional  Surgeon: Marcell Suazo MD  Service: Urology  Known or anticipated Date of Surgery:8/1/2025    Surgeon notes: reviewed    Electronic QUestionnaire Assessment completed via nurse interview with patient.        Triage considerations:     The patient has no apparent active cardiac condition (No unstable coronary Syndrome such as severe unstable angina  or recent [<1 month] myocardial infarction, decompensated CHF, severe valvular   disease or significant arrhythmia)    Previous anesthesia records:GETA and No problems ASA 2   11/20/2024  COLONOSCOPY (Abdomen)       Last PCP note: 6-12 months ago , within [facility]   Subspecialty notes: Dermatology, Urology, OB/GYN Gastro    Other important co-morbidities: HTN and Migraine, AVM in Uterus, Left Renal Mass, Anemia, Arthritis      Tests already available:  Available tests,  within 1 month , within [facility] .   07/11/2025 CBC, CMP  07/02/2025 PT/INR , Iron and TIBC             Instructions given. (See in Nurse's note)    Optimization:  Anesthesia Preop Clinic Assessment  Indicated    Medical Opinion Indicated       Sub-specialist consult indicated:   TBD       Plan:    Testing:  CMP, EKG, Hematology Profile, T&C, and T&S   Pre-anesthesia  visit       Visit focus: concerns in complex and/or prolonged anesthesia, position other than supine,       Consultation:IM Perioperative Hospitalist     Patient  has previously scheduled Medical Appointment:    Navigation: Tests Scheduled.              Consults scheduled.             Results will be tracked by Preop Clinic.      Patient is optimized for surgery.      Mateusz Tellez NP  Perioperative Medicine  [facility]          Electronically signed by Mateusz Tellez NP at 7/30/2025 12:37 PM

## 2025-07-28 ENCOUNTER — HOSPITAL ENCOUNTER (OUTPATIENT)
Dept: CARDIOLOGY | Facility: CLINIC | Age: 70
Discharge: HOME | End: 2025-07-28
Payer: MEDICARE

## 2025-07-28 ENCOUNTER — OFFICE VISIT (OUTPATIENT)
Dept: UROLOGY | Facility: CLINIC | Age: 70
End: 2025-07-28
Payer: MEDICARE

## 2025-07-28 ENCOUNTER — TELEPHONE (OUTPATIENT)
Dept: INTERVENTIONAL RADIOLOGY/VASCULAR | Facility: CLINIC | Age: 70
End: 2025-07-28
Payer: MEDICARE

## 2025-07-28 DIAGNOSIS — Z01.818 PREOPERATIVE TESTING: ICD-10-CM

## 2025-07-28 DIAGNOSIS — N28.89 RENAL MASS, LEFT: Primary | ICD-10-CM

## 2025-07-28 LAB
OHS QRS DURATION: 82 MS
OHS QTC CALCULATION: 445 MS

## 2025-07-28 PROCEDURE — 99499 UNLISTED E&M SERVICE: CPT | Mod: S$GLB,,, | Performed by: UROLOGY

## 2025-07-28 PROCEDURE — 87086 URINE CULTURE/COLONY COUNT: CPT

## 2025-07-28 PROCEDURE — 93010 ELECTROCARDIOGRAM REPORT: CPT | Mod: S$GLB,,, | Performed by: INTERNAL MEDICINE

## 2025-07-28 NOTE — TELEPHONE ENCOUNTER
Call and spoke with patient to schedule IR clinic f/u. S.p IGOR. Patient stated she's having kidney surgery on Fri 8/1 and will be recovering. She resides in [place] and will wait until Autumn, PA comes back to do a virtual visit. She also stated she' doing ok. MSG fwd to provider

## 2025-07-28 NOTE — H&P (VIEW-ONLY)
Urology ([facility]) H&P for upcoming procedure  Staff: Marcell Suazo MD    CC: left renal mass    HPI:  Polina Griffin is a 69 y.o. female with a left renal mass.     She has a past medical history of HTN, uterine fibroid s/p D&C, urge incontinence, and now left renal mass concerning for RCC.    Large left renal mass found incidentally on CT Abdomen pelvis done on 07/03/25.  Solid lobulated heterogeneously enhancing mass replacing the upper pole of the left kidney, extending into the renal sinus fat medially, and into the renal vein. There is a patent renal vein noted just anterior to the mass. The posterior aspect of the tumor crosses the posterior renal fascia. The collecting system is distorted and may be invaded. The IVC is patent.There is no adenopathy.     Patient reports she experience ache in left flank from time to time. Now experiencing nocturia 2-3x. She denies any hematuria, frequency, urgency.     ROS: Negative except for as stated above    Past Medical History:   Diagnosis Date    Abnormal Pap smear of cervix 2011, 2013    LEEP    Anemia     in her 20's    Esophageal reflux     Fibroids     Fibromatosis of plantar fascia     Hypertension     Insomnia     Irritable bowel syndrome (IBS)     Ocular migraine     Renal disorder     Status post embolization of uterine artery     Tinnitus     Urge incontinence        Past Surgical History:   Procedure Laterality Date    APPENDECTOMY  2008    CATARACT EXTRACTION Bilateral 2021    CERVICAL BIOPSY  W/ LOOP ELECTRODE EXCISION  2011    COLONOSCOPY N/A 12/11/2017    Procedure: COLONOSCOPY;  Surgeon: Angel Florez MD;  Location: [facility] ([address] [place] [address]);  Service: Endoscopy;  Laterality: N/A;    COLONOSCOPY N/A 12/07/2022    Procedure: COLONOSCOPY;  Surgeon: Elle Armenta MD;  Location: [facility];  Service: Endoscopy;  Laterality: N/A;    COLONOSCOPY N/A 11/20/2024    Procedure: COLONOSCOPY;  Surgeon: Elle Armenta MD;  Location: [facility];  Service:  Endoscopy;  Laterality: N/A;  aly/rectal bleeding    DILATION AND CURETTAGE OF UTERUS  06/10/2025    ENDOMETRIAL BIOPSY  2010    NASAL TURBINATE REDUCTION  2015    Skin Cancer Removal Right     SUPERFICIAL KERATECTOMY Right 07/2021    TUBAL LIGATION Bilateral 1980    UPPER GASTROINTESTINAL ENDOSCOPY      UTERINE ARTERY EMBOLIZATION N/A     WISDOM TOOTH EXTRACTION Bilateral 1973       Social History     Socioeconomic History    Marital status:    Tobacco Use    Smoking status: Never    Smokeless tobacco: Never   Substance and Sexual Activity    Alcohol use: No    Drug use: No    Sexual activity: Not Currently     Partners: Male     Birth control/protection: None, Post-menopausal     Comment: :      Social Drivers of Health     Financial Resource Strain: Low Risk  (7/17/2025)    Overall Financial Resource Strain (CARDIA)     Difficulty of Paying Living Expenses: Not hard at all   Food Insecurity: No Food Insecurity (7/17/2025)    Hunger Vital Sign     Worried About Running Out of Food in the Last Year: Never true     Ran Out of Food in the Last Year: Never true   Transportation Needs: No Transportation Needs (7/17/2025)    PRAPARE - Transportation     Lack of Transportation (Medical): No     Lack of Transportation (Non-Medical): No   Physical Activity: Sufficiently Active (7/17/2025)    Exercise Vital Sign     Days of Exercise per Week: 4 days     Minutes of Exercise per Session: 60 min   Stress: No Stress Concern Present (7/17/2025)    [employer] - Occupational Stress Questionnaire     Feeling of Stress : Only a little   Housing Stability: Low Risk  (7/17/2025)    Housing Stability Vital Sign     Unable to Pay for Housing in the Last Year: No     Number of Times Moved in the Last Year: 0     Homeless in the Last Year: No       Family History   Problem Relation Name Age of Onset    COPD Mother      Hyperlipidemia Mother      Hypertension Mother      Coronary artery disease  "Mother      Stroke Father      Hypertension Father      Coronary artery disease Father      Heart attack Father      Hyperlipidemia Father      Cancer Father      Prostate cancer Father      Diabetes Brother      Ovarian cancer Neg Hx      Colon cancer Neg Hx      Breast cancer Neg Hx      Crohn's disease Neg Hx      Ulcerative colitis Neg Hx      Stomach cancer Neg Hx      Esophageal cancer Neg Hx      Irritable bowel syndrome Neg Hx      Celiac disease Neg Hx      Rectal cancer Neg Hx      Colon polyps Neg Hx         Review of patient's allergies indicates:   Allergen Reactions    Amoxicillin Rash       Medications Ordered Prior to Encounter[1]    Anticoagulation:  No  GLP1: No    Physical Exam:  Estimated body mass index is 25.19 kg/m² as calculated from the following:    Height as of 7/22/25: 5' 6" (1.676 m).    Weight as of 7/22/25: 70.8 kg (156 lb 1.4 oz).     General: No acute distress, well developed. AAOx3  Head: Normocephalic, Atraumatic  Eyes: Extra-occular movements intact, No discharge  Neck: supple, symmetrical, trachea midline  Lungs: normal respiratory effort, no respiratory distress, no wheezes  CV: regular rate, 2+ pulses  Abdomen: soft, non-tender, non-distended, left kidney palpable, previous port site for appendectomy visble   MSK: no edema, no deformities, normal ROM  Skin: skin color, texture, turgor normal.  Neurologic: no focal deficits, sensation intact     Labs:    Urine dipstick today shows negative for nitrites, leukocytes, glucose, protein, ketones, positive for red blood cells.    Lab Results   Component Value Date    WBC 6.73 07/11/2025    HGB 7.4 (L) 07/11/2025    HCT 22.7 (L) 07/11/2025    MCV 89 07/11/2025     07/11/2025       BMP  Lab Results   Component Value Date     07/11/2025    K 3.7 07/11/2025     07/11/2025    CO2 27 07/11/2025    BUN 7 (L) 07/11/2025    CREATININE 0.6 07/11/2025    CALCIUM 7.6 (L) 07/11/2025    ANIONGAP 6 (L) 07/11/2025    EGFRNORACEVR " >60 07/11/2025       Imaging:   CT AP from 07/03/25 - left renal mass, 4.5 x 9.6  cm, enhancing at upper and middle pole invading into the renal vein, 1 left renal artery, 2 left renal veins.    Chest imaging: CT CHEST - 07/17/25  Small nodules in both lungs measuring up to 6 mm in size.  In the absence of older studies for comparison these remain nonspecific, and surveillance will be needed to establish stability.      Assessment: Polina Griffin is a 69 y.o. female with left renal mass, suspicious for [id number]? RCC    Plan:     1. To OR on 08/01/25 for Left Robotic Radical Nephrectomy  2.  MRI ordered, but not completed. Spoke with rigoberto Whitman to proceed without MRI at this time  3. Type and cross ordered preoperatively. The risks, benefits, and indications of a blood transfusion were discussed. The patient was given a chance to ask questions and all questions answered to her satisfaction. Consent obtained.   4. Labs and EKG today, will follow up the results  5. Pre op clearance on 07/30/25, we will follow up clearance  6. Patient denies taking any GLP1 inhibitors.  7. Urine culture ordered we will follow up     All questions answered at this time.    Eamon Silverio MD          [1]   Current Outpatient Medications on File Prior to Visit   Medication Sig Dispense Refill    amLODIPine (NORVASC) 2.5 MG tablet Take 1 tablet (2.5 mg total) by mouth once daily. 90 tablet 3    BENEFIBER, GUAR GUM, ORAL Take by mouth once daily. 3 Gummies a day      CALCIUM PHOSPHATE TRIB/VIT D3 (CITRACAL + D3, CALCIUM PHOS, ORAL) Take by mouth.      coenzyme Q10 100 mg capsule Take 100 mg by mouth once daily.      ferrous sulfate 325 (65 FE) MG EC tablet Take 1 tablet (325 mg total) by mouth once daily. 30 tablet 0    fluticasone (FLONASE) 50 mcg/actuation nasal spray 1 spray by Each Nostril route once daily.      multivitamin capsule Take 1 capsule by mouth once daily.      mv-min/vit C/elderber/herb 124 (AIRBORNE ELDERBERRY ORAL) 1  tab, Chewed, Daily, 0 Refill(s)      NORVASC 2.5 mg tablet 2.5 mg.      ondansetron (ZOFRAN-ODT) 4 MG TbDL Take 1 tablet (4 mg total) by mouth every 6 (six) hours as needed (nausea). 60 tablet 5    vit C,H-Vk-dsalo-lutein-zeaxan (PRESERVISION AREDS 2) 250-90-40-1 mg Cap Take 1 tablet by mouth once daily.       No current facility-administered medications on file prior to visit.

## 2025-07-28 NOTE — PROGRESS NOTES
Urology ([facility]) H&P for upcoming procedure  Staff: Marcell Suazo MD    CC: left renal mass    HPI:  Polina Griffin is a 69 y.o. female with a left renal mass.     She has a past medical history of HTN, uterine fibroid s/p D&C, urge incontinence, and now left renal mass concerning for RCC.    Large left renal mass found incidentally on CT Abdomen pelvis done on 07/03/25.  Solid lobulated heterogeneously enhancing mass replacing the upper pole of the left kidney, extending into the renal sinus fat medially, and into the renal vein. There is a patent renal vein noted just anterior to the mass. The posterior aspect of the tumor crosses the posterior renal fascia. The collecting system is distorted and may be invaded. The IVC is patent.There is no adenopathy.     Patient reports she experience ache in left flank from time to time. Now experiencing nocturia 2-3x. She denies any hematuria, frequency, urgency.     ROS: Negative except for as stated above    Past Medical History:   Diagnosis Date    Abnormal Pap smear of cervix 2011, 2013    LEEP    Anemia     in her 20's    Esophageal reflux     Fibroids     Fibromatosis of plantar fascia     Hypertension     Insomnia     Irritable bowel syndrome (IBS)     Ocular migraine     Renal disorder     Status post embolization of uterine artery     Tinnitus     Urge incontinence        Past Surgical History:   Procedure Laterality Date    APPENDECTOMY  2008    CATARACT EXTRACTION Bilateral 2021    CERVICAL BIOPSY  W/ LOOP ELECTRODE EXCISION  2011    COLONOSCOPY N/A 12/11/2017    Procedure: COLONOSCOPY;  Surgeon: Angel Florez MD;  Location: [facility] ([address] [place] [address]);  Service: Endoscopy;  Laterality: N/A;    COLONOSCOPY N/A 12/07/2022    Procedure: COLONOSCOPY;  Surgeon: Elle Armenta MD;  Location: [facility];  Service: Endoscopy;  Laterality: N/A;    COLONOSCOPY N/A 11/20/2024    Procedure: COLONOSCOPY;  Surgeon: Elle Armenta MD;  Location: [facility];  Service:  Endoscopy;  Laterality: N/A;  aly/rectal bleeding    DILATION AND CURETTAGE OF UTERUS  06/10/2025    ENDOMETRIAL BIOPSY  2010    NASAL TURBINATE REDUCTION  2015    Skin Cancer Removal Right     SUPERFICIAL KERATECTOMY Right 07/2021    TUBAL LIGATION Bilateral 1980    UPPER GASTROINTESTINAL ENDOSCOPY      UTERINE ARTERY EMBOLIZATION N/A     WISDOM TOOTH EXTRACTION Bilateral 1973       Social History     Socioeconomic History    Marital status:    Tobacco Use    Smoking status: Never    Smokeless tobacco: Never   Substance and Sexual Activity    Alcohol use: No    Drug use: No    Sexual activity: Not Currently     Partners: Male     Birth control/protection: None, Post-menopausal     Comment: :      Social Drivers of Health     Financial Resource Strain: Low Risk  (7/17/2025)    Overall Financial Resource Strain (CARDIA)     Difficulty of Paying Living Expenses: Not hard at all   Food Insecurity: No Food Insecurity (7/17/2025)    Hunger Vital Sign     Worried About Running Out of Food in the Last Year: Never true     Ran Out of Food in the Last Year: Never true   Transportation Needs: No Transportation Needs (7/17/2025)    PRAPARE - Transportation     Lack of Transportation (Medical): No     Lack of Transportation (Non-Medical): No   Physical Activity: Sufficiently Active (7/17/2025)    Exercise Vital Sign     Days of Exercise per Week: 4 days     Minutes of Exercise per Session: 60 min   Stress: No Stress Concern Present (7/17/2025)    [employer] [employer] - Occupational Stress Questionnaire     Feeling of Stress : Only a little   Housing Stability: Low Risk  (7/17/2025)    Housing Stability Vital Sign     Unable to Pay for Housing in the Last Year: No     Number of Times Moved in the Last Year: 0     Homeless in the Last Year: No       Family History   Problem Relation Name Age of Onset    COPD Mother      Hyperlipidemia Mother      Hypertension Mother      Coronary artery disease  "Mother      Stroke Father      Hypertension Father      Coronary artery disease Father      Heart attack Father      Hyperlipidemia Father      Cancer Father      Prostate cancer Father      Diabetes Brother      Ovarian cancer Neg Hx      Colon cancer Neg Hx      Breast cancer Neg Hx      Crohn's disease Neg Hx      Ulcerative colitis Neg Hx      Stomach cancer Neg Hx      Esophageal cancer Neg Hx      Irritable bowel syndrome Neg Hx      Celiac disease Neg Hx      Rectal cancer Neg Hx      Colon polyps Neg Hx         Review of patient's allergies indicates:   Allergen Reactions    Amoxicillin Rash       Medications Ordered Prior to Encounter[1]    Anticoagulation:  No  GLP1: No    Physical Exam:  Estimated body mass index is 25.19 kg/m² as calculated from the following:    Height as of 7/22/25: 5' 6" (1.676 m).    Weight as of 7/22/25: 70.8 kg (156 lb 1.4 oz).     General: No acute distress, well developed. AAOx3  Head: Normocephalic, Atraumatic  Eyes: Extra-occular movements intact, No discharge  Neck: supple, symmetrical, trachea midline  Lungs: normal respiratory effort, no respiratory distress, no wheezes  CV: regular rate, 2+ pulses  Abdomen: soft, non-tender, non-distended, left kidney palpable, previous port site for appendectomy visble   MSK: no edema, no deformities, normal ROM  Skin: skin color, texture, turgor normal.  Neurologic: no focal deficits, sensation intact     Labs:    Urine dipstick today shows negative for nitrites, leukocytes, glucose, protein, ketones, positive for red blood cells.    Lab Results   Component Value Date    WBC 6.73 07/11/2025    HGB 7.4 (L) 07/11/2025    HCT 22.7 (L) 07/11/2025    MCV 89 07/11/2025     07/11/2025       BMP  Lab Results   Component Value Date     07/11/2025    K 3.7 07/11/2025     07/11/2025    CO2 27 07/11/2025    BUN 7 (L) 07/11/2025    CREATININE 0.6 07/11/2025    CALCIUM 7.6 (L) 07/11/2025    ANIONGAP 6 (L) 07/11/2025    EGFRNORACEVR " >60 07/11/2025       Imaging:   CT AP from 07/03/25 - left renal mass, 4.5 x 9.6  cm, enhancing at upper and middle pole invading into the renal vein, 1 left renal artery, 2 left renal veins.    Chest imaging: CT CHEST - 07/17/25  Small nodules in both lungs measuring up to 6 mm in size.  In the absence of older studies for comparison these remain nonspecific, and surveillance will be needed to establish stability.      Assessment: Polina Griffin is a 69 y.o. female with left renal mass, suspicious for [id number]? RCC    Plan:     1. To OR on 08/01/25 for Left Robotic Radical Nephrectomy  2.  MRI ordered, but not completed. Spoke with rigoberto Whitman to proceed without MRI at this time  3. Type and cross ordered preoperatively. The risks, benefits, and indications of a blood transfusion were discussed. The patient was given a chance to ask questions and all questions answered to her satisfaction. Consent obtained.   4. Labs and EKG today, will follow up the results  5. Pre op clearance on 07/30/25, we will follow up clearance  6. Patient denies taking any GLP1 inhibitors.  7. Urine culture ordered we will follow up     All questions answered at this time.    Eamon Silverio MD          [1]   Current Outpatient Medications on File Prior to Visit   Medication Sig Dispense Refill    amLODIPine (NORVASC) 2.5 MG tablet Take 1 tablet (2.5 mg total) by mouth once daily. 90 tablet 3    BENEFIBER, GUAR GUM, ORAL Take by mouth once daily. 3 Gummies a day      CALCIUM PHOSPHATE TRIB/VIT D3 (CITRACAL + D3, CALCIUM PHOS, ORAL) Take by mouth.      coenzyme Q10 100 mg capsule Take 100 mg by mouth once daily.      ferrous sulfate 325 (65 FE) MG EC tablet Take 1 tablet (325 mg total) by mouth once daily. 30 tablet 0    fluticasone (FLONASE) 50 mcg/actuation nasal spray 1 spray by Each Nostril route once daily.      multivitamin capsule Take 1 capsule by mouth once daily.      mv-min/vit C/elderber/herb 124 (AIRBORNE ELDERBERRY ORAL) 1  tab, Chewed, Daily, 0 Refill(s)      NORVASC 2.5 mg tablet 2.5 mg.      ondansetron (ZOFRAN-ODT) 4 MG TbDL Take 1 tablet (4 mg total) by mouth every 6 (six) hours as needed (nausea). 60 tablet 5    vit C,Q-Bb-zamad-lutein-zeaxan (PRESERVISION AREDS 2) 250-90-40-1 mg Cap Take 1 tablet by mouth once daily.       No current facility-administered medications on file prior to visit.

## 2025-07-29 PROBLEM — K21.9 ACID REFLUX: Status: ACTIVE | Noted: 2025-07-29

## 2025-07-29 PROBLEM — N28.89 LEFT RENAL MASS: Status: ACTIVE | Noted: 2025-07-29

## 2025-07-29 PROBLEM — D64.9 NORMOCYTIC ANEMIA: Status: ACTIVE | Noted: 2025-07-29

## 2025-07-29 LAB — BACTERIA UR CULT: NORMAL

## 2025-07-29 NOTE — ASSESSMENT & PLAN NOTE
Current BP  not at goal today; 155/71.    Taking: amlodipine- takes at night; attributes high reading to anxiety of surgery. Diagnosed one month ago.  Lifestyle changes to reduce systolic BP:  exercise 30 minutes per day,  5 days per week or 150 minutes weekly; sodium reduction and avoidance of high salt foods such as processed meats, frozen meals and  fast foods.   Keeping a healthy weight/BMI can help with better BP control  BP acceptable for surgery. I recommend monitoring BP during perioperative period as uncontrolled pain can elevate blood pressure.

## 2025-07-29 NOTE — HPI
This is a 69 y.o. female  who presents today for a preoperative evaluation in preparation for left robotic radical nephrectomy.  Surgery is indicated for  renal mass .   Patient is new to me.  The history has been obtained by speaking with the patient and reviewing the electronic medical record and/or outside health information. Significant health conditions for the perioperative period are discussed below in assessment and plan.   Patient reports current health status to be Good.  Denies any new symptoms before surgery.

## 2025-07-29 NOTE — ASSESSMENT & PLAN NOTE
Alternates with diarrhea and constipation;  tolerates symptoms and not currently treated.   Avoid foods that may triggers IBS symptoms.

## 2025-07-29 NOTE — PROGRESS NOTES
[facility]-PreOp Consults  Progress Note    Patient Name: Polina Griffin  MRN: [id number]  Date of Evaluation- 07/30/2025  PCP- Marisol Law MD    Future cases for Ashley Griffin [[id number]]       Case ID Status Date Time Cm Procedure Provider Location    [id number] [facility] 8/1/2025  7:00  XI ROBOTIC NEPHRECTOMY, RADICAL Marcell Suazo MD [[id number]] [facility] OR 2ND FLR            HPI:  This is a 69 y.o. female  who presents today for a preoperative evaluation in preparation for left robotic radical nephrectomy.  Surgery is indicated for  renal mass .   Patient is new to me.  The history has been obtained by speaking with the patient and reviewing the electronic medical record and/or outside health information. Significant health conditions for the perioperative period are discussed below in assessment and plan.   Patient reports current health status to be Good.  Denies any new symptoms before surgery.       Subjective/ Objective:     Chief Complaint: Preoperative evaulation, perioperative medical management, and complication reduction plan.     Functional Capacity: works out at [employer] 4x weekly using treadmill and exercise bike; denies CP/SOB.       Anesthesia issues: None    Difficulty mouth opening: No    Steroid use in the last 12 months:  No    Dental Issues: None    Family anesthesia difficulty: None     Family Hx of Thrombosis: None      Past Medical History:   Diagnosis Date    Abnormal Pap smear of cervix 2011, 2013    LEEP    Anemia     in her 20's    Esophageal reflux     Fibroids     Fibromatosis of plantar fascia     Hypertension     Insomnia     Irritable bowel syndrome (IBS)     Ocular migraine     Renal disorder     Status post embolization of uterine artery     Tinnitus     Urge incontinence          Past Medical History Pertinent Negatives:   Diagnosis Date Noted    Anxiety 07/30/2025    Asthma 07/30/2025    COPD (chronic obstructive pulmonary disease) 07/30/2025    Coronary artery disease  07/30/2025    Deep vein thrombosis 07/30/2025    Depression 07/30/2025    Diabetes mellitus, type 2 07/30/2025    Hyperlipidemia 07/30/2025    Pulmonary embolism 07/30/2025    Seizures 07/30/2025    Stroke 07/30/2025    Thyroid disease 07/30/2025         Past Surgical History:   Procedure Laterality Date    APPENDECTOMY  2008    CATARACT EXTRACTION Bilateral 2021    CERVICAL BIOPSY  W/ LOOP ELECTRODE EXCISION  2011    COLONOSCOPY N/A 12/11/2017    Procedure: COLONOSCOPY;  Surgeon: Angel Florez MD;  [facility]  Service: Endoscopy;  Laterality: N/A;    COLONOSCOPY N/A 12/07/2022    Procedure: COLONOSCOPY;  Surgeon: Elle Armenta MD;  Location: [facility] ENDO;  Service: Endoscopy;  Laterality: N/A;    COLONOSCOPY N/A 11/20/2024    Procedure: COLONOSCOPY;  Surgeon: Elle Armenta MD;  Location: [facility] ENDO;  Service: Endoscopy;  Laterality: N/A;  aly/rectal bleeding    DILATION AND CURETTAGE OF UTERUS  06/10/2025    ENDOMETRIAL BIOPSY  2010    NASAL TURBINATE REDUCTION  2015    Skin Cancer Removal Right     SUPERFICIAL KERATECTOMY Right 07/2021    TUBAL LIGATION Bilateral 1980    UPPER GASTROINTESTINAL ENDOSCOPY      UTERINE ARTERY EMBOLIZATION N/A     WISDOM TOOTH EXTRACTION Bilateral 1973       Review of Systems   Constitutional:  Negative for chills, fatigue, fever and unexpected weight change.   HENT:  Positive for hearing loss (no longer wears hearing aids), postnasal drip (Flonase daily) and tinnitus. Negative for congestion, rhinorrhea, sore throat and trouble swallowing.    Eyes:  Negative for visual disturbance.   Respiratory:  Negative for cough, chest tightness, shortness of breath and wheezing.         STOP BANG risk factors:  Snoring  HTN   Cardiovascular:  Negative for chest pain, palpitations and leg swelling.   Gastrointestinal:  Positive for constipation (IBS; alternates with diarrhea. attributes to stress) and diarrhea.        Denies Fatty liver, Hepatitis   Genitourinary:  " Negative for decreased urine volume, difficulty urinating, dysuria, frequency, hematuria and urgency.        Stress incontinence   Musculoskeletal:  Positive for back pain. Negative for arthralgias, neck pain and neck stiffness.   Neurological:  Negative for dizziness, syncope, weakness, numbness and headaches.        Reports ocular migraines   Hematological:  Bruises/bleeds easily.   Psychiatric/Behavioral:  Negative for suicidal ideas.               VITALS  Visit Vitals  BP (!) 155/71 (BP Location: Right arm, Patient Position: Sitting)   Pulse 94   Temp 98.1 °F (36.7 °C) (Oral)   Ht 5' 6" (1.676 m)   Wt 70.8 kg (156 lb)   LMP  (LMP Unknown)   SpO2 97%   BMI 25.18 kg/m²          Physical Exam  Vitals reviewed.   Constitutional:       General: She is not in acute distress.     Appearance: She is well-developed.   HENT:      Head: Normocephalic.      Nose: Nose normal.      Mouth/Throat:      Pharynx: No oropharyngeal exudate.   Eyes:      General:         Right eye: No discharge.         Left eye: No discharge.      Conjunctiva/sclera: Conjunctivae normal.      Pupils: Pupils are equal, round, and reactive to light.   Neck:      Thyroid: No thyromegaly.      Vascular: No carotid bruit or JVD.      Trachea: No tracheal deviation.   Cardiovascular:      Rate and Rhythm: Normal rate and regular rhythm.      Pulses:           Carotid pulses are 2+ on the right side and 2+ on the left side.       Dorsalis pedis pulses are 2+ on the right side and 2+ on the left side.        Posterior tibial pulses are 2+ on the right side and 2+ on the left side.      Heart sounds: Normal heart sounds. No murmur heard.  Pulmonary:      Effort: Pulmonary effort is normal. No respiratory distress.      Breath sounds: Normal breath sounds. No stridor. No wheezing, rhonchi or rales.   Abdominal:      General: Bowel sounds are normal. There is no distension.      Palpations: Abdomen is soft.      Tenderness: There is no abdominal tenderness. " There is no guarding.   Musculoskeletal:      Cervical back: Normal range of motion. No pain with movement.      Right lower leg: No edema.      Left lower leg: No edema.   Lymphadenopathy:      Cervical: No cervical adenopathy.   Skin:     General: Skin is warm and dry.      Capillary Refill: Capillary refill takes less than 2 seconds.      Findings: No erythema or rash.   Neurological:      Mental Status: She is alert and oriented to person, place, and time.   Psychiatric:         Behavior: Behavior normal. Behavior is cooperative.          Significant Labs:  Lab Results   Component Value Date    WBC 5.41 07/28/2025    HGB 10.3 (L) 07/28/2025    HCT 34.2 (L) 07/28/2025     07/28/2025    CHOL 204 (H) 08/06/2024    TRIG 133 08/06/2024    HDL 45 08/06/2024    ALT 10 07/28/2025    AST 21 07/28/2025     07/28/2025    K 4.1 07/28/2025     07/28/2025    CREATININE 0.6 07/28/2025    BUN 7 (L) 07/28/2025    CO2 27 07/28/2025    TSH 3.013 08/06/2024    INR 0.9 07/02/2025    HGBA1C 5.1 08/06/2024           EKG:   Results for orders placed or performed during the hospital encounter of 07/28/25   EKG 12-lead    Collection Time: 07/28/25 10:48 AM   Result Value Ref Range    QRS Duration 82 ms    [facility] QTC Calculation 445 ms    Narrative    Test Reason : Z01.818,    Vent. Rate :  85 BPM     Atrial Rate :  85 BPM     P-R Int : 164 ms          QRS Dur :  82 ms      QT Int : 374 ms       P-R-T Axes :  68 -29  66 degrees    QTcB Int : 445 ms    Sinus rhythm with occasional Premature ventricular complexes  Otherwise normal ECG  When compared with ECG of 12-Apr-2018 10:11,  Premature ventricular complexes are now Present  The axis Shifted left  Confirmed by Tony Collado ([id number]) on 7/28/2025 2:51:43 PM    Referred By: IMANI ALANIZ           Confirmed By: Tony Collado         [employer] Stress 2018  CONCLUSIONS     1 - Low normal to mildly depressed left ventricular systolic function (EF 50-55%).     2 - Indeterminate LV  diastolic function.     3 - Normal right ventricular systolic function .     4 - No wall motion abnormalities.     Non-specific finding demonstrating failure to augment involving the inferior septum, anterolateral wall, inferior wall, anterior wall which may be associated with ischemia; clinical correlation required.         This document has been electronically    SIGNED BY: Tonja Alvarenga MD On: 04/12/2018 11:27          Imaging   CT chest 7/17/25  FINDINGS:  There is no abnormality at the base of the neck.     There is no mediastinal nor axillary adenopathy.  No pleural effusion is present.  The aorta is normal in caliber with scattered calcific plaque.  There are coronary artery calcifications.     Calcified granulomata are present in the lungs.  In the right lung there are few tiny nodules measuring 2 and 3 mm in size, for instance in the right apex on series 3, image 62.  In the left lung there is a 6 mm nodule in the upper lobe series 3, image 105; there are additional scattered nodules measuring 2-4 mm in the left lung, for example the 4 mm lower lobe nodule series 3, image 182.     There is a partially imaged left renal mass.  Please refer to the report of the CTA of the abdomen and pelvis today.     Impression:     Small nodules in both lungs measuring up to 6 mm in size.  In the absence of older studies for comparison these remain nonspecific, and surveillance will be needed to establish stability.  These are below the size limits of resolution of PET CT.        Electronically signed by: Alexus Mello  Date:                                            07/17/2025  Time:                                           15:35        Active Cardiac Conditions: None        Revised Cardiac Risk Index   High -Risk Surgery  Intraperitoneal; Intrathoracic; suprainguinal vascular Yes- + 1 No- 0- robotic   History of Ischemic Heart Disease   (Hx of MI/positive exercise test/current chest pain due to ischemia/use  of nitrate therapy/EKG with pathological Q waves) Yes- + 1 No- 0   History of CHF  (Pulmonary edema/bilateral rales or S3 gallop/PND/CXR showing pulmonary vascular redistribution) Yes- + 1 No- 0   History of CVA   (Prior stroke or TIA) Yes- + 1 No- 0   Pre-operative treatment with insulin Yes- + 1 No- 0   Pre-operative creatinine > 2mg/dl Yes- + 1 No- 0   Total: 0      Risk Status:  Estimated risk of cardiac complications after non-cardiac surgery using the Revised Cardiac Risk Index for Preoperative risk is 3.9 %      ARISCAT (Canet) risk index: Intermediate: 13.3% risk of post-op pulmonary complications.    American Society of Anesthesiologists Physical Status classification (ASA): 2                      Assessment/Plan:     Left renal mass  Scheduled with Dr. Suazo on 8/1/25 for left robotic radical nephrectomy.    Hypertension  Current BP  not at goal today; 155/71.    Taking: amlodipine- takes at night; attributes high reading to anxiety of surgery. Diagnosed one month ago.  Lifestyle changes to reduce systolic BP:  exercise 30 minutes per day,  5 days per week or 150 minutes weekly; sodium reduction and avoidance of high salt foods such as processed meats, frozen meals and  fast foods.   Keeping a healthy weight/BMI can help with better BP control  BP acceptable for surgery. I recommend monitoring BP during perioperative period as uncontrolled pain can elevate blood pressure.         Irritable bowel syndrome (IBS)  Alternates with diarrhea and constipation;  tolerates symptoms and not currently treated.   Avoid foods that may triggers IBS symptoms.     Acid reflux  Occasional symptoms and not currently treated.      Normocytic anemia  Current labs:  Hgb-    10.3  Hct-  34.2; improved since 7/11/25  S/P  postmenopausal bleeding secondary to AVM in uterus  S/P uterine artery emobolization 7/10/25  Recommend monitoring during perioperative period.     Snoring  Denies KAREN. Possible sleep apnea: recommend caution  with sedating medication in the perioperative period.         Discussion/Management of Perioperative Care    Thromboembolic prophylaxis (VTE) Care: Risk factors for thrombosis include: age and surgical procedure.  I recommend prophylaxis of thromboembolism with the use of compression stockings/pneumatic devices, and/or pharmacologic agents. The benefits should outweigh the risks for pharmacologic prophylaxis in the perioperative period. I also encourage early ambulation if not contraindicated during the post-operative period.    Risk factors for post-operative pulmonary complications include:HTN and surgery > 3 hours. To reduce the risk of pulmonary complications, prophylactic recommendations include: incentive spirometry use/deep breathing, end tidal carbon dioxide monitoring, and pain control.    Risk factors for renal complications include: HTN. To reduce the risk of postoperative renal complications, I recommend the patient maintain adequate hydration.  Avoid/reduce NSAIDS and BRUSH-2 inhibitors use as well as IV contrast for renal protection.    I recommend the use of appropriate prophylactic antibiotics to reduce the risk of surgical site infections.    Delirium risk factors include advanced age. I recommend to avoid/reduce use of benzodiazepine use (not for patients who take on a regular basis), anticholinergics, Benadryl,  and agents that may cause postoperative serotonin syndrome.  Controlled pain can decrease the risk for postop delirium and since opioids are used for postoperative pain control, I suggest using the lowest dose for the shortest amount of time necessary for pain management.     The patient is at an increased risk for urinary retention due to : urological procedure. I recommend to avoid/decrease the use of benzodiazepines, anticholinergics, and Benadryl in the perioperative period. I also recommend using opioids for the shortest period of time if possible.          This visit was focused on  Preoperative evaluation, Perioperative Medical management, complication reduction plans. I suggest that the patient follows up with primary care or relevant sub specialists for ongoing health care.    I appreciate the opportunity to be involved in this patients care. Please feel free to contact me if there were any questions about this consultation.        I spent a total of 30 minutes on the day of the visit.This includes face to face time and non-face to face time preparing to see the patient (e.g., review of tests), obtaining and/or reviewing separately obtained history, documenting clinical information in the electronic or other health record, independently interpreting results and communicating results to the patient/family/caregiver, or care coordinator.       Patient is optimized for surgery.       Mateusz Tellez NP  Perioperative Medicine  [facility]

## 2025-07-29 NOTE — OUTPATIENT SUBJECTIVE & OBJECTIVE
Outpatient Subjective & Objective      Chief Complaint: Preoperative evaulation, perioperative medical management, and complication reduction plan.     Functional Capacity: works out at [employer] 4x weekly using treadmill and exercise bike; denies CP/SOB.       Anesthesia issues: None    Difficulty mouth opening: No    Steroid use in the last 12 months:  No    Dental Issues: None    Family anesthesia difficulty: None     Family Hx of Thrombosis: None      Past Medical History:   Diagnosis Date    Abnormal Pap smear of cervix 2011, 2013    LEEP    Anemia     in her 20's    Esophageal reflux     Fibroids     Fibromatosis of plantar fascia     Hypertension     Insomnia     Irritable bowel syndrome (IBS)     Ocular migraine     Renal disorder     Status post embolization of uterine artery     Tinnitus     Urge incontinence          Past Medical History Pertinent Negatives:   Diagnosis Date Noted    Anxiety 07/30/2025    Asthma 07/30/2025    COPD (chronic obstructive pulmonary disease) 07/30/2025    Coronary artery disease 07/30/2025    Deep vein thrombosis 07/30/2025    Depression 07/30/2025    Diabetes mellitus, type 2 07/30/2025    Hyperlipidemia 07/30/2025    Pulmonary embolism 07/30/2025    Seizures 07/30/2025    Stroke 07/30/2025    Thyroid disease 07/30/2025         Past Surgical History:   Procedure Laterality Date    APPENDECTOMY  2008    CATARACT EXTRACTION Bilateral 2021    CERVICAL BIOPSY  W/ LOOP ELECTRODE EXCISION  2011    COLONOSCOPY N/A 12/11/2017    Procedure: COLONOSCOPY;  Surgeon: Angel Florez MD;  Location: [address];  Service: Endoscopy;  Laterality: N/A;    COLONOSCOPY N/A 12/07/2022    Procedure: COLONOSCOPY;  Surgeon: Elle Armenta MD;  Location: [facility];  Service: Endoscopy;  Laterality: N/A;    COLONOSCOPY N/A 11/20/2024    Procedure: COLONOSCOPY;  Surgeon: Elle Armenta MD;  Location: [facility];  Service: Endoscopy;  Laterality: N/A;  aly/rectal bleeding     "DILATION AND CURETTAGE OF UTERUS  06/10/2025    ENDOMETRIAL BIOPSY  2010    NASAL TURBINATE REDUCTION  2015    Skin Cancer Removal Right     SUPERFICIAL KERATECTOMY Right 07/2021    TUBAL LIGATION Bilateral 1980    UPPER GASTROINTESTINAL ENDOSCOPY      UTERINE ARTERY EMBOLIZATION N/A     WISDOM TOOTH EXTRACTION Bilateral 1973       Review of Systems   Constitutional:  Negative for chills, fatigue, fever and unexpected weight change.   HENT:  Positive for hearing loss (no longer wears hearing aids), postnasal drip (Flonase daily) and tinnitus. Negative for congestion, rhinorrhea, sore throat and trouble swallowing.    Eyes:  Negative for visual disturbance.   Respiratory:  Negative for cough, chest tightness, shortness of breath and wheezing.         STOP BANG risk factors:  Snoring  HTN   Cardiovascular:  Negative for chest pain, palpitations and leg swelling.   Gastrointestinal:  Positive for constipation (IBS; alternates with diarrhea. attributes to stress) and diarrhea.        Denies Fatty liver, Hepatitis   Genitourinary:  Negative for decreased urine volume, difficulty urinating, dysuria, frequency, hematuria and urgency.        Stress incontinence   Musculoskeletal:  Positive for back pain. Negative for arthralgias, neck pain and neck stiffness.   Neurological:  Negative for dizziness, syncope, weakness, numbness and headaches.        Reports ocular migraines   Hematological:  Bruises/bleeds easily.   Psychiatric/Behavioral:  Negative for suicidal ideas.               VITALS  Visit Vitals  BP (!) 155/71 (BP Location: Right arm, Patient Position: Sitting)   Pulse 94   Temp 98.1 °F (36.7 °C) (Oral)   Ht 5' 6" (1.676 m)   Wt 70.8 kg (156 lb)   LMP  (LMP Unknown)   SpO2 97%   BMI 25.18 kg/m²          Physical Exam  Vitals reviewed.   Constitutional:       General: She is not in acute distress.     Appearance: She is well-developed.   HENT:      Head: Normocephalic.      Nose: Nose normal.      Mouth/Throat:      " Pharynx: No oropharyngeal exudate.   Eyes:      General:         Right eye: No discharge.         Left eye: No discharge.      Conjunctiva/sclera: Conjunctivae normal.      Pupils: Pupils are equal, round, and reactive to light.   Neck:      Thyroid: No thyromegaly.      Vascular: No carotid bruit or JVD.      Trachea: No tracheal deviation.   Cardiovascular:      Rate and Rhythm: Normal rate and regular rhythm.      Pulses:           Carotid pulses are 2+ on the right side and 2+ on the left side.       Dorsalis pedis pulses are 2+ on the right side and 2+ on the left side.        Posterior tibial pulses are 2+ on the right side and 2+ on the left side.      Heart sounds: Normal heart sounds. No murmur heard.  Pulmonary:      Effort: Pulmonary effort is normal. No respiratory distress.      Breath sounds: Normal breath sounds. No stridor. No wheezing, rhonchi or rales.   Abdominal:      General: Bowel sounds are normal. There is no distension.      Palpations: Abdomen is soft.      Tenderness: There is no abdominal tenderness. There is no guarding.   Musculoskeletal:      Cervical back: Normal range of motion. No pain with movement.      Right lower leg: No edema.      Left lower leg: No edema.   Lymphadenopathy:      Cervical: No cervical adenopathy.   Skin:     General: Skin is warm and dry.      Capillary Refill: Capillary refill takes less than 2 seconds.      Findings: No erythema or rash.   Neurological:      Mental Status: She is alert and oriented to person, place, and time.   Psychiatric:         Behavior: Behavior normal. Behavior is cooperative.          Significant Labs:  Lab Results   Component Value Date    WBC 5.41 07/28/2025    HGB 10.3 (L) 07/28/2025    HCT 34.2 (L) 07/28/2025     07/28/2025    CHOL 204 (H) 08/06/2024    TRIG 133 08/06/2024    HDL 45 08/06/2024    ALT 10 07/28/2025    AST 21 07/28/2025     07/28/2025    K 4.1 07/28/2025     07/28/2025    CREATININE 0.6 07/28/2025     BUN 7 (L) 07/28/2025    CO2 27 07/28/2025    TSH 3.013 08/06/2024    INR 0.9 07/02/2025    HGBA1C 5.1 08/06/2024           EKG:   Results for orders placed or performed during the hospital encounter of 07/28/25   EKG 12-lead    Collection Time: 07/28/25 10:48 AM   Result Value Ref Range    QRS Duration 82 ms    OHS QTC Calculation 445 ms    Narrative    Test Reason : Z01.818,    Vent. Rate :  85 BPM     Atrial Rate :  85 BPM     P-R Int : 164 ms          QRS Dur :  82 ms      QT Int : 374 ms       P-R-T Axes :  68 -29  66 degrees    QTcB Int : 445 ms    Sinus rhythm with occasional Premature ventricular complexes  Otherwise normal ECG  When compared with ECG of 12-Apr-2018 10:11,  Premature ventricular complexes are now Present  The axis Shifted left  Confirmed by Tony Collado ([id number]) on 7/28/2025 2:51:43 PM    Referred By: IMANI ALANIZ           Confirmed By: Tony Wilson Exercise Stress 2018  CONCLUSIONS     1 - Low normal to mildly depressed left ventricular systolic function (EF 50-55%).     2 - Indeterminate LV diastolic function.     3 - Normal right ventricular systolic function .     4 - No wall motion abnormalities.     Non-specific finding demonstrating failure to augment involving the inferior septum, anterolateral wall, inferior wall, anterior wall which may be associated with ischemia; clinical correlation required.         This document has been electronically    SIGNED BY: Tonja Alvarenga MD On: 04/12/2018 11:27          Imaging   CT chest 7/17/25  FINDINGS:  There is no abnormality at the base of the neck.     There is no mediastinal nor axillary adenopathy.  No pleural effusion is present.  The aorta is normal in caliber with scattered calcific plaque.  There are coronary artery calcifications.     Calcified granulomata are present in the lungs.  In the right lung there are few tiny nodules measuring 2 and 3 mm in size, for instance in the right apex on series 3, image 62.  In the left  lung there is a 6 mm nodule in the upper lobe series 3, image 105; there are additional scattered nodules measuring 2-4 mm in the left lung, for example the 4 mm lower lobe nodule series 3, image 182.     There is a partially imaged left renal mass.  Please refer to the report of the CTA of the abdomen and pelvis today.     Impression:     Small nodules in both lungs measuring up to 6 mm in size.  In the absence of older studies for comparison these remain nonspecific, and surveillance will be needed to establish stability.  These are below the size limits of resolution of PET CT.        Electronically signed by: Alexus Mello  Date:                                            07/17/2025  Time:                                           15:35        Active Cardiac Conditions: None        Revised Cardiac Risk Index   High -Risk Surgery  Intraperitoneal; Intrathoracic; suprainguinal vascular Yes- + 1 No- 0- robotic   History of Ischemic Heart Disease   (Hx of MI/positive exercise test/current chest pain due to ischemia/use of nitrate therapy/EKG with pathological Q waves) Yes- + 1 No- 0   History of CHF  (Pulmonary edema/bilateral rales or S3 gallop/PND/CXR showing pulmonary vascular redistribution) Yes- + 1 No- 0   History of CVA   (Prior stroke or TIA) Yes- + 1 No- 0   Pre-operative treatment with insulin Yes- + 1 No- 0   Pre-operative creatinine > 2mg/dl Yes- + 1 No- 0   Total: 0      Risk Status:  Estimated risk of cardiac complications after non-cardiac surgery using the Revised Cardiac Risk Index for Preoperative risk is 3.9 %      ARISCAT (Canet) risk index: Intermediate: 13.3% risk of post-op pulmonary complications.    American Society of Anesthesiologists Physical Status classification (ASA): 2             Outpatient Subjective & Objective

## 2025-07-29 NOTE — ASSESSMENT & PLAN NOTE
Current labs:  Hgb-    10.3  Hct-  34.2; improved since 7/11/25  S/P  postmenopausal bleeding secondary to AVM in uterus  S/P uterine artery emobolization 7/10/25  Recommend monitoring during perioperative period.

## 2025-07-30 ENCOUNTER — PATIENT MESSAGE (OUTPATIENT)
Dept: UROLOGY | Facility: CLINIC | Age: 70
End: 2025-07-30
Payer: MEDICARE

## 2025-07-30 ENCOUNTER — OFFICE VISIT (OUTPATIENT)
Facility: CLINIC | Age: 70
End: 2025-07-30
Payer: MEDICARE

## 2025-07-30 VITALS
OXYGEN SATURATION: 97 % | SYSTOLIC BLOOD PRESSURE: 155 MMHG | BODY MASS INDEX: 25.07 KG/M2 | HEIGHT: 66 IN | WEIGHT: 156 LBS | DIASTOLIC BLOOD PRESSURE: 71 MMHG | HEART RATE: 94 BPM | TEMPERATURE: 98 F

## 2025-07-30 DIAGNOSIS — N28.89 LEFT RENAL MASS: ICD-10-CM

## 2025-07-30 DIAGNOSIS — D64.9 NORMOCYTIC ANEMIA: ICD-10-CM

## 2025-07-30 DIAGNOSIS — K21.9 GASTROESOPHAGEAL REFLUX DISEASE, UNSPECIFIED WHETHER ESOPHAGITIS PRESENT: ICD-10-CM

## 2025-07-30 DIAGNOSIS — R06.83 SNORING: ICD-10-CM

## 2025-07-30 DIAGNOSIS — Z01.818 PREOPERATIVE EXAMINATION: Primary | ICD-10-CM

## 2025-07-30 DIAGNOSIS — I10 PRIMARY HYPERTENSION: ICD-10-CM

## 2025-07-30 DIAGNOSIS — K58.1 IRRITABLE BOWEL SYNDROME WITH CONSTIPATION: ICD-10-CM

## 2025-07-30 DIAGNOSIS — N28.89 RENAL MASS: Primary | ICD-10-CM

## 2025-07-30 PROCEDURE — 3077F SYST BP >= 140 MM HG: CPT | Mod: CPTII,S$GLB,, | Performed by: NURSE PRACTITIONER

## 2025-07-30 PROCEDURE — 3078F DIAST BP <80 MM HG: CPT | Mod: CPTII,S$GLB,, | Performed by: NURSE PRACTITIONER

## 2025-07-30 PROCEDURE — 99999 PR PBB SHADOW E&M-EST. PATIENT-LVL IV: CPT | Mod: PBBFAC,,, | Performed by: NURSE PRACTITIONER

## 2025-07-30 PROCEDURE — 1160F RVW MEDS BY RX/DR IN RCRD: CPT | Mod: CPTII,S$GLB,, | Performed by: NURSE PRACTITIONER

## 2025-07-30 PROCEDURE — 1111F DSCHRG MED/CURRENT MED MERGE: CPT | Mod: CPTII,S$GLB,, | Performed by: NURSE PRACTITIONER

## 2025-07-30 PROCEDURE — 99214 OFFICE O/P EST MOD 30 MIN: CPT | Mod: S$GLB,,, | Performed by: NURSE PRACTITIONER

## 2025-07-30 PROCEDURE — 1159F MED LIST DOCD IN RCRD: CPT | Mod: CPTII,S$GLB,, | Performed by: NURSE PRACTITIONER

## 2025-07-30 PROCEDURE — 3008F BODY MASS INDEX DOCD: CPT | Mod: CPTII,S$GLB,, | Performed by: NURSE PRACTITIONER

## 2025-07-30 RX ORDER — AMOXICILLIN AND CLAVULANATE POTASSIUM 875; 125 MG/1; MG/1
1 TABLET, FILM COATED ORAL 2 TIMES DAILY
Qty: 6 TABLET | Refills: 0 | Status: SHIPPED | OUTPATIENT
Start: 2025-07-30

## 2025-07-30 RX ORDER — CIPROFLOXACIN 500 MG/1
500 TABLET, FILM COATED ORAL 2 TIMES DAILY
Qty: 6 TABLET | Refills: 0 | Status: SHIPPED | OUTPATIENT
Start: 2025-07-30 | End: 2025-08-02

## 2025-07-30 NOTE — PROGRESS NOTES
Urology Progress Note    Urine culture resulted as multiple organisms, none of predominance. I have spoken with Dr. Suazo and called the patient. We are going to treat ahead of her surgery tomorrow. Antibiotics called to her home pharmacy. I left a message on the patient's cell phone but will reach out to her again given short time ahead of her surgery.    Eamon Silverio MD  Department of Urology, PGY2  [facility]

## 2025-07-30 NOTE — DISCHARGE INSTRUCTIONS
.Your surgery has been scheduled for:______________8/1/25____________________________    You should report to:  ____[facility], located on the [place] side of the first floor of the           [facility] ([phone])  __X__[facility], located on the [facility] side of the            Second floor of the [facility] ([phone])  ____3rd Floor [facility] located on the [facility] side of the [facility] [phone]  ____[facility]: located [address] [place] [place] [address]. [address].     Please Note   Tell your doctor if you take Aspirin, products containing Aspirin, herbal medications  or blood thinners, such as Coumadin, Ticlid, or Plavix.  (Consult your provider regarding holding or stopping before surgery).  Arrange for someone to drive you home following surgery.  You will not be allowed to leave the surgical facility alone or drive yourself home following sedation and anesthesia.    Before Surgery  Stop taking all herbal medications, vitamins, and supplements 7 days prior to surgery  No Motrin/Advil (Ibuprofen) 7 days before surgery  No Aleve (Naproxen) 7 days before surgery   No Goody's/BC Powder 7 days before surgery  Refrain from drinking alcoholic beverages for 24 hours before and after surgery  Stop or limit smoking at least 24 hours prior to surgery  You may take Tylenol for pain    Night before Surgery  Do not eat or drink after midnight  Take a shower or bath (shower is recommended).  Bathe with Hibiclens soap or an antibacterial soap from the neck down.  If not supplied by your surgeon, hibiclens soap will need to be purchased over the counter in pharmacy.  Rinse soap off thoroughly.  Shampoo your hair with your regular shampoo    The Day of Surgery  Take another bath or shower with hibiclens or any antibacterial soap, to reduce the chance of infection.  Take heart  and blood pressure medications with a small sip of water, as advised by the perioperative team.  Do not take fluid pills  You may brush your teeth and rinse your mouth, but do not swallow any additional water.   Do not apply perfumes, powder, body lotions or deodorant on the day of surgery.  Nail polish should be removed.  Do not wear makeup or moisturizer  Wear comfortable clothes, such as a button front shirt and loose fitting pants.  Leave all jewelry, including body piercings, and valuables at home.    Bring any devices you will need after surgery such as crutches or canes.  If you have sleep apnea, please bring your CPAP machine  In the event that your physical condition changes including the onset of a cold or respiratory illness, or if you have to delay or cancel your surgery, please notify your surgeon.

## 2025-07-30 NOTE — ASSESSMENT & PLAN NOTE
Denies KAREN. Possible sleep apnea: recommend caution with sedating medication in the perioperative period.

## 2025-07-31 ENCOUNTER — ANESTHESIA EVENT (OUTPATIENT)
Dept: SURGERY | Facility: HOSPITAL | Age: 70
End: 2025-07-31
Payer: MEDICARE

## 2025-07-31 ENCOUNTER — TELEPHONE (OUTPATIENT)
Dept: UROLOGY | Facility: CLINIC | Age: 70
End: 2025-07-31
Payer: MEDICARE

## 2025-07-31 RX ORDER — ACETAMINOPHEN 500 MG
1000 TABLET ORAL
OUTPATIENT
Start: 2025-08-01 | End: 2025-08-01

## 2025-07-31 NOTE — ANESTHESIA PREPROCEDURE EVALUATION
[facility]  Anesthesia Pre-Operative Evaluation         Patient Name: Polina Griffin  YOB: 1955  MRN: [id number]    SUBJECTIVE:     Pre-operative evaluation for Procedure(s) (LRB):  XI ROBOTIC NEPHRECTOMY, RADICAL (Left)     07/31/2025    Polina Griffin is a 69 y.o. female w/ a significant PMHx of ocular migraine, HTN, acid reflux, anemia, renal mass.    Per urology, patient has a large left renal mass found incidentally on CT Abdomen pelvis done on 07/03/25.  Solid lobulated heterogeneously enhancing mass replacing the upper pole of the left kidney, extending into the renal sinus fat medially, and into the renal vein. There is a patent renal vein noted just anterior to the mass. The posterior aspect of the tumor crosses the posterior renal fascia. The collecting system is distorted and may be invaded. The IVC is patent.There is no adenopathy.     Patient now presents for the above procedure(s).      LDA: None documented.       Prev airway: None documented.    Drips: None documented.      Problem List[1]    Review of patient's allergies indicates:   Allergen Reactions    Amoxicillin Rash       Current Inpatient Medications:      Medications Ordered Prior to Encounter[2]    Past Surgical History:   Procedure Laterality Date    APPENDECTOMY  2008    CATARACT EXTRACTION Bilateral 2021    CERVICAL BIOPSY  W/ LOOP ELECTRODE EXCISION  2011    COLONOSCOPY N/A 12/11/2017    Procedure: COLONOSCOPY;  Surgeon: Angel Florez MD;  Location: [facility] ([address] [place] [address]);  Service: Endoscopy;  Laterality: N/A;    COLONOSCOPY N/A 12/07/2022    Procedure: COLONOSCOPY;  Surgeon: Elle Armenta MD;  Location: [facility];  Service: Endoscopy;  Laterality: N/A;    COLONOSCOPY N/A 11/20/2024    Procedure: COLONOSCOPY;  Surgeon: Elle Armenta MD;  Location: [facility];  Service: Endoscopy;  Laterality: N/A;  aly/rectal bleeding    DILATION AND CURETTAGE OF UTERUS  06/10/2025    ENDOMETRIAL BIOPSY  2010     NASAL TURBINATE REDUCTION  2015    Skin Cancer Removal Right     SUPERFICIAL KERATECTOMY Right 07/2021    TUBAL LIGATION Bilateral 1980    UPPER GASTROINTESTINAL ENDOSCOPY      UTERINE ARTERY EMBOLIZATION N/A     WISDOM TOOTH EXTRACTION Bilateral 1973       Social History[3]    OBJECTIVE:     Vital Signs Range (Last 24H):         Significant Labs:  Lab Results   Component Value Date    WBC 5.41 07/28/2025    HGB 10.3 (L) 07/28/2025    HCT 34.2 (L) 07/28/2025     07/28/2025    CHOL 204 (H) 08/06/2024    TRIG 133 08/06/2024    HDL 45 08/06/2024    ALT 10 07/28/2025    AST 21 07/28/2025     07/28/2025    K 4.1 07/28/2025     07/28/2025    CREATININE 0.6 07/28/2025    BUN 7 (L) 07/28/2025    CO2 27 07/28/2025    TSH 3.013 08/06/2024    INR 0.9 07/02/2025    HGBA1C 5.1 08/06/2024       Diagnostic Studies: No relevant studies.    EKG:   Results for orders placed or performed during the hospital encounter of 07/28/25   EKG 12-lead    Collection Time: 07/28/25 10:48 AM   Result Value Ref Range    QRS Duration 82 ms    OHS QTC Calculation 445 ms    Narrative    Test Reason : Z01.818,    Vent. Rate :  85 BPM     Atrial Rate :  85 BPM     P-R Int : 164 ms          QRS Dur :  82 ms      QT Int : 374 ms       P-R-T Axes :  68 -29  66 degrees    QTcB Int : 445 ms    Sinus rhythm with occasional Premature ventricular complexes  Otherwise normal ECG  When compared with ECG of 12-Apr-2018 10:11,  Premature ventricular complexes are now Present  The axis Shifted left  Confirmed by Tony Collado ([id number]) on 7/28/2025 2:51:43 PM    Referred By: IMANI ALANIZ           Confirmed By: Tony Collado       2D ECHO:  TTE:  No results found for this or any previous visit.    CITLALI:  No results found for this or any previous visit.    ASSESSMENT/PLAN:                                                                                                                  07/31/2025  Polina Griffin is a 69 y.o., female.      Pre-op  Assessment    I have reviewed the Patient Summary Reports.     I have reviewed the Nursing Notes. I have reviewed the NPO Status.   I have reviewed the Medications.     Review of Systems  Anesthesia Hx:  No problems with previous Anesthesia               Denies Personal Hx of Anesthesia complications.                    Social:  Non-Smoker       Cardiovascular:     Hypertension   Denies MI.     Denies CABG/stent.  Denies Dysrhythmias.                                Hypertension         Pulmonary:    Denies COPD.  Denies Asthma.                    Renal/:       Kidney Function/Disease             Hepatic/GI:     GERD         Gerd          Neurological:    Denies CVA.   Headaches Denies Seizures.     Dx of Headaches                           Endocrine:  Denies Diabetes.                  Anesthesia Plan  Type of Anesthesia, risks & benefits discussed:    Anesthesia Type: Gen ETT  Intra-op Monitoring Plan: Standard ASA Monitors  Post Op Pain Control Plan: multimodal analgesia and IV/PO Opioids PRN  Induction:  IV  Airway Plan: Direct, Post-Induction  Informed Consent: Informed consent signed with the Patient and all parties understand the risks and agree with anesthesia plan.  All questions answered.   ASA Score: 3  Day of Surgery Review of History & Physical: H&P Update referred to the surgeon/provider.    Ready For Surgery From Anesthesia Perspective.     .           [1]   Patient Active Problem List  Diagnosis    Ocular migraine    Hypertension    Irritable bowel syndrome (IBS)    Fibromatosis of plantar fascia    Urge incontinence    Intramural leiomyoma of uterus    Angiokeratoma of vulva    Postmenopausal bleeding    Non-cardiac chest pain    Ingrown nail    Paronychia of great toe, right    Xerosis of skin    Uterine fibroid    Left renal mass    Acid reflux    Normocytic anemia    Snoring   [2]   No current facility-administered medications on file prior to encounter.     Current Outpatient Medications on File  Prior to Encounter   Medication Sig Dispense Refill    amLODIPine (NORVASC) 2.5 MG tablet Take 1 tablet (2.5 mg total) by mouth once daily. 90 tablet 3    BENEFIBER, GUAR GUM, ORAL Take by mouth once daily. 3 Gummies a day      CALCIUM PHOSPHATE TRIB/VIT D3 (CITRACAL + D3, CALCIUM PHOS, ORAL) Take by mouth.      coenzyme Q10 100 mg capsule Take 100 mg by mouth once daily.      ferrous sulfate 325 (65 FE) MG EC tablet Take 1 tablet (325 mg total) by mouth once daily. 30 tablet 0    fluticasone (FLONASE) 50 mcg/actuation nasal spray 1 spray by Each Nostril route once daily.      multivitamin capsule Take 1 capsule by mouth once daily.      vit C,P-Ki-dbpxa-lutein-zeaxan (PRESERVISION AREDS 2) 250-90-40-1 mg Cap Take 1 tablet by mouth once daily.     [3]   Social History  Socioeconomic History    Marital status:    Tobacco Use    Smoking status: Never    Smokeless tobacco: Never   Substance and Sexual Activity    Alcohol use: No    Drug use: No    Sexual activity: Not Currently     Partners: Male     Birth control/protection: None, Post-menopausal     Comment: :      Social Drivers of Health     Financial Resource Strain: Low Risk  (7/17/2025)    Overall Financial Resource Strain (CARDIA)     Difficulty of Paying Living Expenses: Not hard at all   Food Insecurity: No Food Insecurity (7/17/2025)    Hunger Vital Sign     Worried About Running Out of Food in the Last Year: Never true     Ran Out of Food in the Last Year: Never true   Transportation Needs: No Transportation Needs (7/17/2025)    PRAPARE - Transportation     Lack of Transportation (Medical): No     Lack of Transportation (Non-Medical): No   Physical Activity: Sufficiently Active (7/17/2025)    Exercise Vital Sign     Days of Exercise per Week: 4 days     Minutes of Exercise per Session: 60 min   Stress: No Stress Concern Present (7/17/2025)    [employer] - Occupational Stress Questionnaire     Feeling of Stress :  Only a little   Housing Stability: Low Risk  (7/17/2025)    Housing Stability Vital Sign     Unable to Pay for Housing in the Last Year: No     Number of Times Moved in the Last Year: 0     Homeless in the Last Year: No

## 2025-07-31 NOTE — TELEPHONE ENCOUNTER
Instructions for Day of Surgery      Report To:    SHIR[facility], 2nd floor of [facility]    Arrival time: 5am    Night Before Surgery     Nothing to eat or drink after midnight the night before your surgery  Take medications as instructed the morning of surgery  No alcoholic beverages 24 hours prior to surgery

## 2025-08-01 ENCOUNTER — HOSPITAL ENCOUNTER (INPATIENT)
Facility: HOSPITAL | Age: 70
LOS: 1 days | Discharge: HOME | DRG: 658 | End: 2025-08-02
Attending: UROLOGY | Admitting: UROLOGY
Payer: MEDICARE

## 2025-08-01 ENCOUNTER — PATIENT OUTREACH (OUTPATIENT)
Dept: ADMINISTRATIVE | Facility: HOSPITAL | Age: 70
End: 2025-08-01
Payer: MEDICARE

## 2025-08-01 ENCOUNTER — ANESTHESIA (OUTPATIENT)
Dept: SURGERY | Facility: HOSPITAL | Age: 70
End: 2025-08-01
Payer: MEDICARE

## 2025-08-01 DIAGNOSIS — N28.89 RENAL MASS: Primary | ICD-10-CM

## 2025-08-01 DIAGNOSIS — Z01.818 PREOPERATIVE TESTING: ICD-10-CM

## 2025-08-01 DIAGNOSIS — N28.89 RENAL MASS, LEFT: ICD-10-CM

## 2025-08-01 LAB
INDIRECT COOMBS: NORMAL
RH BLD: NORMAL
SPECIMEN OUTDATE: NORMAL

## 2025-08-01 PROCEDURE — 71000015 HC POSTOP RECOV 1ST HR: Performed by: UROLOGY

## 2025-08-01 PROCEDURE — 63600175 PHARM REV CODE 636 W HCPCS

## 2025-08-01 PROCEDURE — 86900 BLOOD TYPING SEROLOGIC ABO: CPT

## 2025-08-01 PROCEDURE — 36000712 HC OR TIME LEV V 1ST 15 MIN: Performed by: UROLOGY

## 2025-08-01 PROCEDURE — 25000003 PHARM REV CODE 250

## 2025-08-01 PROCEDURE — 36620 INSERTION CATHETER ARTERY: CPT | Mod: XU,,, | Performed by: STUDENT IN AN ORGANIZED HEALTH CARE EDUCATION/TRAINING PROGRAM

## 2025-08-01 PROCEDURE — 64468 THRC FASCIAL PLN BLK BI NJX: CPT | Mod: XU,,, | Performed by: ANESTHESIOLOGY

## 2025-08-01 PROCEDURE — 99900035 HC TECH TIME PER 15 MIN (STAT)

## 2025-08-01 PROCEDURE — 64461 PVB THORACIC SINGLE INJ SITE: CPT

## 2025-08-01 PROCEDURE — 94761 N-INVAS EAR/PLS OXIMETRY MLT: CPT

## 2025-08-01 PROCEDURE — 37000009 HC ANESTHESIA EA ADD 15 MINS: Performed by: UROLOGY

## 2025-08-01 PROCEDURE — 25000003 PHARM REV CODE 250: Performed by: INTERNAL MEDICINE

## 2025-08-01 PROCEDURE — 11000001 HC ACUTE MED/SURG PRIVATE ROOM

## 2025-08-01 PROCEDURE — 71000016 HC POSTOP RECOV ADDL HR: Performed by: UROLOGY

## 2025-08-01 PROCEDURE — 37000008 HC ANESTHESIA 1ST 15 MINUTES: Performed by: UROLOGY

## 2025-08-01 PROCEDURE — 88341 IMHCHEM/IMCYTCHM EA ADD ANTB: CPT | Mod: TC | Performed by: UROLOGY

## 2025-08-01 PROCEDURE — 86920 COMPATIBILITY TEST SPIN: CPT | Performed by: STUDENT IN AN ORGANIZED HEALTH CARE EDUCATION/TRAINING PROGRAM

## 2025-08-01 PROCEDURE — 71000033 HC RECOVERY, INTIAL HOUR: Performed by: UROLOGY

## 2025-08-01 PROCEDURE — 27201423 OPTIME MED/SURG SUP & DEVICES STERILE SUPPLY: Performed by: UROLOGY

## 2025-08-01 PROCEDURE — 36000713 HC OR TIME LEV V EA ADD 15 MIN: Performed by: UROLOGY

## 2025-08-01 PROCEDURE — D9220A PRA ANESTHESIA: Mod: ,,, | Performed by: STUDENT IN AN ORGANIZED HEALTH CARE EDUCATION/TRAINING PROGRAM

## 2025-08-01 PROCEDURE — 0TT14ZZ RESECTION OF LEFT KIDNEY, PERCUTANEOUS ENDOSCOPIC APPROACH: ICD-10-PCS | Performed by: UROLOGY

## 2025-08-01 PROCEDURE — 0GT24ZZ RESECTION OF LEFT ADRENAL GLAND, PERCUTANEOUS ENDOSCOPIC APPROACH: ICD-10-PCS | Performed by: UROLOGY

## 2025-08-01 PROCEDURE — 63600175 PHARM REV CODE 636 W HCPCS: Performed by: ANESTHESIOLOGY

## 2025-08-01 RX ORDER — GLUCAGON 1 MG
1 KIT INJECTION
Status: DISCONTINUED | OUTPATIENT
Start: 2025-08-01 | End: 2025-08-01 | Stop reason: HOSPADM

## 2025-08-01 RX ORDER — CEFAZOLIN 2 G/1
2 INJECTION, POWDER, FOR SOLUTION INTRAMUSCULAR; INTRAVENOUS ONCE
Status: DISCONTINUED | OUTPATIENT
Start: 2025-08-01 | End: 2025-08-01

## 2025-08-01 RX ORDER — PREGABALIN 75 MG/1
150 CAPSULE ORAL
Status: COMPLETED | OUTPATIENT
Start: 2025-08-01 | End: 2025-08-01

## 2025-08-01 RX ORDER — OXYCODONE HYDROCHLORIDE 5 MG/1
5 TABLET ORAL EVERY 4 HOURS PRN
Refills: 0 | Status: DISCONTINUED | OUTPATIENT
Start: 2025-08-01 | End: 2025-08-02 | Stop reason: HOSPADM

## 2025-08-01 RX ORDER — AMLODIPINE BESYLATE 2.5 MG/1
2.5 TABLET ORAL DAILY
Status: DISCONTINUED | OUTPATIENT
Start: 2025-08-01 | End: 2025-08-02 | Stop reason: HOSPADM

## 2025-08-01 RX ORDER — ONDANSETRON 8 MG/1
8 TABLET, ORALLY DISINTEGRATING ORAL EVERY 8 HOURS PRN
Status: DISCONTINUED | OUTPATIENT
Start: 2025-08-01 | End: 2025-08-02 | Stop reason: HOSPADM

## 2025-08-01 RX ORDER — SODIUM CHLORIDE 0.9 % (FLUSH) 0.9 %
10 SYRINGE (ML) INJECTION
Status: DISCONTINUED | OUTPATIENT
Start: 2025-08-01 | End: 2025-08-01 | Stop reason: HOSPADM

## 2025-08-01 RX ORDER — FENTANYL CITRATE 50 UG/ML
25-200 INJECTION, SOLUTION INTRAMUSCULAR; INTRAVENOUS
Status: DISCONTINUED | OUTPATIENT
Start: 2025-08-01 | End: 2025-08-01

## 2025-08-01 RX ORDER — PREGABALIN 150 MG/1
150 CAPSULE ORAL
Status: DISCONTINUED | OUTPATIENT
Start: 2025-08-01 | End: 2025-08-02 | Stop reason: HOSPADM

## 2025-08-01 RX ORDER — BUPIVACAINE HYDROCHLORIDE 7.5 MG/ML
INJECTION, SOLUTION EPIDURAL; RETROBULBAR
Status: COMPLETED | OUTPATIENT
Start: 2025-08-01 | End: 2025-08-01

## 2025-08-01 RX ORDER — ROCURONIUM BROMIDE 10 MG/ML
INJECTION, SOLUTION INTRAVENOUS
Status: DISCONTINUED | OUTPATIENT
Start: 2025-08-01 | End: 2025-08-01

## 2025-08-01 RX ORDER — HEPARIN SODIUM 5000 [USP'U]/ML
5000 INJECTION, SOLUTION INTRAVENOUS; SUBCUTANEOUS EVERY 8 HOURS
Status: DISCONTINUED | OUTPATIENT
Start: 2025-08-01 | End: 2025-08-01

## 2025-08-01 RX ORDER — PROMETHAZINE HYDROCHLORIDE 25 MG/1
25 TABLET ORAL EVERY 6 HOURS PRN
Status: DISCONTINUED | OUTPATIENT
Start: 2025-08-01 | End: 2025-08-02 | Stop reason: HOSPADM

## 2025-08-01 RX ORDER — ACETAMINOPHEN 500 MG
1000 TABLET ORAL
Status: DISCONTINUED | OUTPATIENT
Start: 2025-08-01 | End: 2025-08-02 | Stop reason: HOSPADM

## 2025-08-01 RX ORDER — ACETAMINOPHEN 500 MG
1000 TABLET ORAL EVERY 6 HOURS
Status: DISCONTINUED | OUTPATIENT
Start: 2025-08-01 | End: 2025-08-02 | Stop reason: HOSPADM

## 2025-08-01 RX ORDER — KETAMINE HCL IN 0.9 % NACL 50 MG/5 ML
SYRINGE (ML) INTRAVENOUS
Status: DISCONTINUED | OUTPATIENT
Start: 2025-08-01 | End: 2025-08-01

## 2025-08-01 RX ORDER — CEFAZOLIN 2 G/1
2 INJECTION, POWDER, FOR SOLUTION INTRAMUSCULAR; INTRAVENOUS
Status: COMPLETED | OUTPATIENT
Start: 2025-08-01 | End: 2025-08-01

## 2025-08-01 RX ORDER — LIDOCAINE HYDROCHLORIDE 20 MG/ML
INJECTION INTRAVENOUS
Status: DISCONTINUED | OUTPATIENT
Start: 2025-08-01 | End: 2025-08-01

## 2025-08-01 RX ORDER — SODIUM CHLORIDE, SODIUM LACTATE, POTASSIUM CHLORIDE, CALCIUM CHLORIDE 600; 310; 30; 20 MG/100ML; MG/100ML; MG/100ML; MG/100ML
INJECTION, SOLUTION INTRAVENOUS CONTINUOUS
Status: DISCONTINUED | OUTPATIENT
Start: 2025-08-01 | End: 2025-08-02 | Stop reason: HOSPADM

## 2025-08-01 RX ORDER — HEPARIN SODIUM 5000 [USP'U]/ML
5000 INJECTION, SOLUTION INTRAVENOUS; SUBCUTANEOUS EVERY 8 HOURS
Status: DISCONTINUED | OUTPATIENT
Start: 2025-08-01 | End: 2025-08-02 | Stop reason: HOSPADM

## 2025-08-01 RX ORDER — MIDAZOLAM HYDROCHLORIDE 1 MG/ML
.5-4 INJECTION, SOLUTION INTRAMUSCULAR; INTRAVENOUS
Status: DISCONTINUED | OUTPATIENT
Start: 2025-08-01 | End: 2025-08-01

## 2025-08-01 RX ORDER — KETOROLAC TROMETHAMINE 15 MG/ML
15 INJECTION, SOLUTION INTRAMUSCULAR; INTRAVENOUS
Status: DISCONTINUED | OUTPATIENT
Start: 2025-08-01 | End: 2025-08-02 | Stop reason: HOSPADM

## 2025-08-01 RX ORDER — PROPOFOL 10 MG/ML
VIAL (ML) INTRAVENOUS
Status: DISCONTINUED | OUTPATIENT
Start: 2025-08-01 | End: 2025-08-01

## 2025-08-01 RX ORDER — KETOROLAC TROMETHAMINE 15 MG/ML
15 INJECTION, SOLUTION INTRAMUSCULAR; INTRAVENOUS
Status: DISCONTINUED | OUTPATIENT
Start: 2025-08-01 | End: 2025-08-01

## 2025-08-01 RX ORDER — CLONIDINE 100 UG/ML
INJECTION, SOLUTION EPIDURAL
Status: COMPLETED | OUTPATIENT
Start: 2025-08-01 | End: 2025-08-01

## 2025-08-01 RX ORDER — FAMOTIDINE 20 MG/1
20 TABLET, FILM COATED ORAL 2 TIMES DAILY
Status: DISCONTINUED | OUTPATIENT
Start: 2025-08-01 | End: 2025-08-02 | Stop reason: HOSPADM

## 2025-08-01 RX ORDER — DEXAMETHASONE SODIUM PHOSPHATE 4 MG/ML
INJECTION, SOLUTION INTRA-ARTICULAR; INTRALESIONAL; INTRAMUSCULAR; INTRAVENOUS; SOFT TISSUE
Status: DISCONTINUED | OUTPATIENT
Start: 2025-08-01 | End: 2025-08-01

## 2025-08-01 RX ORDER — MUPIROCIN 20 MG/G
OINTMENT TOPICAL 2 TIMES DAILY
Status: DISCONTINUED | OUTPATIENT
Start: 2025-08-01 | End: 2025-08-02 | Stop reason: HOSPADM

## 2025-08-01 RX ORDER — ACETAMINOPHEN 500 MG
1000 TABLET ORAL
Status: COMPLETED | OUTPATIENT
Start: 2025-08-01 | End: 2025-08-01

## 2025-08-01 RX ORDER — SODIUM CHLORIDE 9 MG/ML
INJECTION, SOLUTION INTRAVENOUS CONTINUOUS
Status: DISCONTINUED | OUTPATIENT
Start: 2025-08-01 | End: 2025-08-01

## 2025-08-01 RX ADMIN — SODIUM CHLORIDE: 9 INJECTION, SOLUTION INTRAVENOUS at 06:08

## 2025-08-01 RX ADMIN — SODIUM CHLORIDE, SODIUM GLUCONATE, SODIUM ACETATE, POTASSIUM CHLORIDE, MAGNESIUM CHLORIDE, SODIUM PHOSPHATE, DIBASIC, AND POTASSIUM PHOSPHATE: .53; .5; .37; .037; .03; .012; .00082 INJECTION, SOLUTION INTRAVENOUS at 07:08

## 2025-08-01 RX ADMIN — LIDOCAINE HYDROCHLORIDE 100 MG: 20 INJECTION INTRAVENOUS at 06:08

## 2025-08-01 RX ADMIN — ACETAMINOPHEN 1000 MG: 500 TABLET ORAL at 05:08

## 2025-08-01 RX ADMIN — ROCURONIUM BROMIDE 20 MG: 10 INJECTION, SOLUTION INTRAVENOUS at 08:08

## 2025-08-01 RX ADMIN — CLONIDINE HYDROCHLORIDE 100 MCG: 100 INJECTION, SOLUTION INTRAVENOUS at 06:08

## 2025-08-01 RX ADMIN — AMLODIPINE BESYLATE 2.5 MG: 2.5 TABLET ORAL at 12:08

## 2025-08-01 RX ADMIN — FENTANYL CITRATE 25 MCG: 50 INJECTION INTRAMUSCULAR; INTRAVENOUS at 10:08

## 2025-08-01 RX ADMIN — Medication 20 MG: at 09:08

## 2025-08-01 RX ADMIN — BUPIVACAINE HYDROCHLORIDE 30 ML: 7.5 INJECTION, SOLUTION EPIDURAL; RETROBULBAR at 06:08

## 2025-08-01 RX ADMIN — KETOROLAC TROMETHAMINE 15 MG: 15 INJECTION INTRAMUSCULAR at 05:08

## 2025-08-01 RX ADMIN — SODIUM CHLORIDE: 9 INJECTION, SOLUTION INTRAVENOUS at 10:08

## 2025-08-01 RX ADMIN — ROCURONIUM BROMIDE 10 MG: 10 INJECTION, SOLUTION INTRAVENOUS at 09:08

## 2025-08-01 RX ADMIN — SODIUM CHLORIDE, POTASSIUM CHLORIDE, SODIUM LACTATE AND CALCIUM CHLORIDE: 600; 310; 30; 20 INJECTION, SOLUTION INTRAVENOUS at 10:08

## 2025-08-01 RX ADMIN — SUGAMMADEX 200 MG: 100 INJECTION, SOLUTION INTRAVENOUS at 10:08

## 2025-08-01 RX ADMIN — HEPARIN SODIUM 5000 UNITS: 5000 INJECTION INTRAVENOUS; SUBCUTANEOUS at 09:08

## 2025-08-01 RX ADMIN — ROCURONIUM BROMIDE 50 MG: 10 INJECTION, SOLUTION INTRAVENOUS at 06:08

## 2025-08-01 RX ADMIN — CEFAZOLIN 2 G: 2 INJECTION, POWDER, FOR SOLUTION INTRAMUSCULAR; INTRAVENOUS at 03:08

## 2025-08-01 RX ADMIN — SODIUM CHLORIDE: 9 INJECTION, SOLUTION INTRAVENOUS at 07:08

## 2025-08-01 RX ADMIN — FAMOTIDINE 20 MG: 20 TABLET, FILM COATED ORAL at 11:08

## 2025-08-01 RX ADMIN — HEPARIN SODIUM 5000 UNITS: 5000 INJECTION INTRAVENOUS; SUBCUTANEOUS at 05:08

## 2025-08-01 RX ADMIN — ACETAMINOPHEN 1000 MG: 500 TABLET ORAL at 11:08

## 2025-08-01 RX ADMIN — HEPARIN SODIUM 5000 UNITS: 5000 INJECTION INTRAVENOUS; SUBCUTANEOUS at 01:08

## 2025-08-01 RX ADMIN — OXYCODONE HYDROCHLORIDE 5 MG: 5 TABLET ORAL at 11:08

## 2025-08-01 RX ADMIN — CEFAZOLIN 2 G: 2 INJECTION, POWDER, FOR SOLUTION INTRAMUSCULAR; INTRAVENOUS at 07:08

## 2025-08-01 RX ADMIN — DEXAMETHASONE SODIUM PHOSPHATE 4 MG: 4 INJECTION, SOLUTION INTRAMUSCULAR; INTRAVENOUS at 08:08

## 2025-08-01 RX ADMIN — MUPIROCIN: 20 OINTMENT TOPICAL at 09:08

## 2025-08-01 RX ADMIN — MIDAZOLAM 2 MG: 1 INJECTION INTRAMUSCULAR; INTRAVENOUS at 06:08

## 2025-08-01 RX ADMIN — PREGABALIN 150 MG: 75 CAPSULE ORAL at 05:08

## 2025-08-01 RX ADMIN — FENTANYL CITRATE 100 MCG: 50 INJECTION INTRAMUSCULAR; INTRAVENOUS at 06:08

## 2025-08-01 RX ADMIN — FAMOTIDINE 20 MG: 20 TABLET, FILM COATED ORAL at 09:08

## 2025-08-01 RX ADMIN — CEFAZOLIN 2 G: 2 INJECTION, POWDER, FOR SOLUTION INTRAMUSCULAR; INTRAVENOUS at 11:08

## 2025-08-01 RX ADMIN — PROPOFOL 150 MG: 10 INJECTION, EMULSION INTRAVENOUS at 06:08

## 2025-08-01 RX ADMIN — FENTANYL CITRATE 50 MCG: 50 INJECTION INTRAMUSCULAR; INTRAVENOUS at 06:08

## 2025-08-01 NOTE — NURSING TRANSFER
Nursing Transfer Note      8/1/2025   12:09 PM    Nurse giving handoff:Scott LIZ RN  Nurse receiving handoff:***    Reason patient is being transferred: meets criteria    Transfer To: 501    Transfer via bed    Transfer with none    Transported by transport x2    Transfer Vital Signs:  Blood Pressure:***  Heart Rate:***  O2:***  Temperature:***  Respirations:***    Telemetry: Rhythm sr  Order for Tele Monitor? No    Additional Lines: Mcintyre Catheter    Medicines sent: LR @ 125    Any special needs or follow-up needed: none    Patient belongings transferred with patient: ***    Chart send with patient: Yes    Notified: {NOTIFIED:23199}    Patient reassessed at: 8/1

## 2025-08-01 NOTE — PLAN OF CARE
[facility] - Surgery  Initial Discharge Assessment       Primary Care Provider: Marisol Law MD    Admission Diagnosis: Renal mass, left [N28.89]  Renal mass [N28.89]    Admission Date: 8/1/2025  Expected Discharge Date: 8/2/2025         Payor: HU[employer] MANAGED MEDICARE / Plan: HU[employer] MEDICARE HMO / Product Type: Capitation /     Extended Emergency Contact Information  Primary Emergency Contact: Jeffery Griffin  Address: [address] [place] [address], [facility] [facility]  Home Phone: [phone]  Mobile Phone: [phone]  Relation: Spouse    Discharge Plan A: Home with family         [id number] - [place] [place] - [address]  [address]  [place] [address]  Phone: [phone] Fax: [phone]    [facility] - [facility] [address] [facility]  [address] [facility] [address]  Phone: [phone] Fax: [phone]      Initial Assessment (most recent)       Adult Discharge Assessment - 08/01/25 1603          Discharge Assessment    Assessment Type Discharge Planning Assessment     Confirmed/corrected address, phone number and insurance Yes     Confirmed Demographics Correct on Facesheet     Source of Information patient     Communicated DOC with patient/caregiver Yes     People in Home spouse     Name(s) of People in Home Rich Griffin     Facility Arrived From: Home     Do you expect to return to your current living situation? Yes     Do you have help at home or someone to help you manage your care at home? Yes     Who are your caregiver(s) and their phone number(s)? Jeffery (spouse)     Prior to hospitilization cognitive status: Alert/Oriented     Current cognitive status: Alert/Oriented     Equipment Currently Used at Home none     Readmission within 30 days? No     Patient currently being followed by outpatient case management? No     Do you currently have service(s) that help you manage your care at home? No      Do you take prescription medications? Yes     Do you have prescription coverage? Yes     Do you have any problems affording any of your prescribed medications? No     Is the patient taking medications as prescribed? yes     Who is going to help you get home at discharge? Spouse-Rich     How do you get to doctors appointments? family or friend will provide     Are you on dialysis? No     Do you take coumadin? No     Discharge Plan A Home with family        Physical Activity    On average, how many days per week do you engage in moderate to strenuous exercise (like a brisk walk)? 4 days     On average, how many minutes do you engage in exercise at this level? 30 min        Financial Resource Strain    How hard is it for you to pay for the very basics like food, housing, medical care, and heating? Not hard at all        Housing Stability    In the last 12 months, was there a time when you were not able to pay the mortgage or rent on time? No     At any time in the past 12 months, were you homeless or living in a shelter (including now)? No        Transportation Needs    In the past 12 months, has lack of transportation kept you from medical appointments or from getting medications? No     In the past 12 months, has lack of transportation kept you from meetings, work, or from getting things needed for daily living? No        Food Insecurity    Within the past 12 months, you worried that your food would run out before you got the money to buy more. Never true     Within the past 12 months, the food you bought just didn't last and you didn't have money to get more. Never true        Stress    Do you feel stress - tense, restless, nervous, or anxious, or unable to sleep at night because your mind is troubled all the time - these days? Not at all        Social Isolation    How often do you feel lonely or isolated from those around you?  Never        Alcohol Use    Q1: How often do you have a drink containing alcohol?  Never     Q2: How many drinks containing alcohol do you have on a typical day when you are drinking? Patient does not drink     Q3: How often do you have six or more drinks on one occasion? Never        Utilities    In the past 12 months has the electric, gas, oil, or water company threatened to shut off services in your home? No        Health Literacy    How often do you need to have someone help you when you read instructions, pamphlets, or other written material from your doctor or pharmacy? Never                      Spoke with patient and spouse at bedside to complete d/c planning assessment. Patient lives with her spouse in a single story home with 20 steps to enter. Independent with [employer]. No DME in home. Verified PCP, Pharmacy and health insurance. Patient expected to d/c home to care of spouse on 8/2 if meeting post-op milestones. Discharge Plan A and Plan B have been determined by review of patient's clinical status, future medical and therapeutic needs, and coverage/benefits for post-acute care in coordination with multidisciplinary team members.      Gema CURRAN  Case Management  [facility]  [phone]

## 2025-08-01 NOTE — INTERVAL H&P NOTE
The patient has been examined and the H&P has been reviewed:    I concur with the findings and no changes have occurred since H&P was written.    Surgery risks, benefits and alternative options discussed and understood by patient/family.    All benefits, risks, and alternatives were explained to the patient in great detail. All patient questions were addressed and the patient voiced clear understanding of our discussion. The patient has elected to undergo left nephrectomy.    The patient denies all recent n/v/d, fevers, chills, and illnesses.       Active Hospital Problems    Diagnosis  POA    *Left renal mass [N28.89]  Yes      Resolved Hospital Problems   No resolved problems to display.

## 2025-08-01 NOTE — NURSING
68 y/o female AAOX4 arrived to floor from PACU. NAD noted on room air. V/S taken. Surgical incision clean,dry, intact. Orientated patient to room and use of call light. Safety measures in place. Verbalized understanding.  at the bedside.

## 2025-08-01 NOTE — BRIEF OP NOTE
[facility] ([facility])  Brief Operative Note    SUMMARY     Surgery Date: 8/1/2025     Surgeons and Role:     * Marcell Suazo MD - Primary     * Jero Mayfield MD - Resident - Assisting     * King Dwyer DO - Resident - Assisting        Pre-op Diagnosis:  Renal mass, left [N28.89]    Post-op Diagnosis:  Post-Op Diagnosis Codes:     * Renal mass, left [N28.89]    Procedure(s) (LRB):  XI ROBOTIC NEPHRECTOMY, RADICAL (Left)    Anesthesia: General/Regional    Implants:  * No implants in log *    Operative Findings: left nephrectomy without complication. Extended excision of left renal vein lateral to the aorta, excellent hemostasis.     Estimated Blood Loss: * No values recorded between 8/1/2025  6:55 AM and 8/1/2025 10:31 AM *    Estimated Blood Loss has been documented.         Specimens:   Specimen (24h ago, onward)       Start     Ordered    08/01/25 0954  Specimen to Pathology Urology  RELEASE UPON ORDERING        References:    Click here for ordering Quick Tip   Question:  Release to patient  Answer:  Immediate    08/01/25 0954                  ID Type Source Tests Collected by Time Destination   1 : Left Kidney - Permanent Tissue Kidney, Left SPECIMEN TO PATHOLOGY Marcell Suazo MD 8/1/2025 0951        [id number]

## 2025-08-01 NOTE — BRIEF OP NOTE
[facility] Operative [facility]    [facility]  Operative Note    Date: 08/01/2025    Pre-Op Diagnosis: left renal mass suspicious for renal cell carcinoma    Post-Op Diagnosis: same    Procedure(s) Performed:   Robotic left nephrectomy    Specimen(s): Left kidney with gerota's fascia and adrenal gland    Staff Surgeon: Marcell Suazo MD    Assistant Surgeon: Jero Mayfield MD; King Dwyer DO      Bedside Assistant:  XUAN Gonzalez (no qualified resident available for bedside assistance)      Anesthesia: General endotracheal anesthesia    Indications: Polina Griffin is a 69 y.o. 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    Findings:  No complications noted.  Left renal vein taken proximal to the aorta to ensure removal of the renal vein thrombus    Estimated Blood Loss: min    Drains:  None      CLINICAL HISTORY: The patient was recently noted to have a large left renal mass. The patient was consented to a robotic-assisted nephrectomy.     OPERATIVE PROCEDURE:The patient was brought to the operating room, and after identification by name and number, was placed supine on the operating table. General endotracheal anesthesia was administered, and a Mcintyre catheter was placed, and clear yellow urine was left to gravity drainage. The patient was then moved into the modified right lateral decubitus position in order to allow access to the left flank. The patient was prepped and draped in the usual sterile manner. Pre-operative antibiotics and site of surgery were confirmed with a pre-operative surgical timeout with the OR staff.     A Veress needle was used to obtain entry into the peritoneum, and the peritoneal cavity was insufflated without difficulty. Four robotic ports were then placed in a straight line configuration.  A 12 mm assistant port was placed in the midline below the third robotic arm. An  additional 5mm port was placed for bedside assistance and retraction.    Dissection was begun by reflecting the colon medially. The gonadal vein was then  away from the lateral fascial tissue, and the ureter was identified. The fourth arm of the robot was placed under the lower pole of the kidney and ureter and the kidney were placed on upward traction.  The gonadal vein was followed up toward the hilum. The renal hilum was visualized and dissected from its fascia. 1 artery and 1 vein was noted.     Once the renal artery was skeletonized, a robotic stapler was used to ligate the artery.  The renal vein was dissected along its proximal course medial to the aorta to ensure a clean margin with the image findings of a renal vein thrombus.  The vessel sealer was used to facilitate this dissection.  Subsequently the renal vein was stapled using the robotic stapler---proximal to its crossing the aorta.  A total of 3 robotic stapler loads were required to control the entire hilum.  Following ligation of the hilum, there was good hemostasis.  Subsequently, the ureter was transected with the vessel sealer.  The kidney was freed superiorly and laterally freeing the kidney entirely along with the surrounding gerota's fascia.  The adrenal gland was removed during the resection.     Once the resection was completed, the kidney was placed in an Endo Catch bag. Adequate hemostasis was noted.  Removal of the kidney was then accomplished by removing the endocatch bag directly through the assistant port in the midline. It was sent to pathology for further analysis. Grossly negative margins were observed.     The fascia from the extraction site was closed with 0 PDS suture.   The incisions were closed at the skin level with subcuticular 4-0 monocryl suture followed by dermabond.    The patient was returned to the supine position and emerged from general anesthesia without incident. The patient was taken to the PACU in stable  condition.    Disposition: The patient will remain on the urology service overnight for observation.

## 2025-08-01 NOTE — PLAN OF CARE
Pt IV flushed. Pt reports mild abd pain, oral meds given. Lap sites x4 and midline incision x1 CDI. Mcintyre in place and draining. LR @ 125

## 2025-08-01 NOTE — ANESTHESIA PROCEDURE NOTES
Peripheral IV Insertion    Diagnosis: I99.8 Other disorder of circulatory system    Patient location during procedure: OR    Staffing  Authorizing Provider: Brett Cunningham DO  Performing Provider: Usama Lu MD    Staffing  Performed by: Usama Lu MD  Authorized by: Brett Cunningham DO    Anesthesiologist was present at the time of the procedure.    Preanesthetic Checklist  Completed: patient identified, IV checked, site marked, risks and benefits discussed, surgical consent, monitors and equipment checked, pre-op evaluation, timeout performed and anesthesia consent givenPeripheral IV Insertion  Skin Prep: chlorhexidine gluconate  Orientation: left  Location: forearm  Catheter Type: peripheral IV (single lumen)  Catheter Size: 18 G  Catheter placement by Anatomical landmarks. Heme positive aspiration all ports. Insertion Attempts: 1  Assessment  Dressing: secured with tape and tegaderm  Patient: Tolerated well  Line flushed easily.

## 2025-08-01 NOTE — PLAN OF CARE
Problem: Adult Inpatient Plan of Care  Goal: Plan of Care Review  Outcome: Progressing     Problem: Adult Inpatient Plan of Care  Goal: Patient-Specific Goal (Individualized)  Outcome: Progressing     Problem: Hospitalized Older Adult  Goal: Fluid and Electrolyte Balance  Outcome: Progressing     Problem: Hospitalized Older Adult  Goal: Improved Oral Intake  Outcome: Progressing     Problem: Infection  Goal: Absence of Infection Signs and Symptoms  Outcome: Progressing     Problem: Fall Injury Risk  Goal: Absence of Fall and Fall-Related Injury  Outcome: Progressing

## 2025-08-01 NOTE — ANESTHESIA PROCEDURE NOTES
Intubation    Date/Time: 8/1/2025 7:02 AM    Performed by: Usama Lu MD  Authorized by: Brett Cunningham,     Intubation:     Induction:  Intravenous    Intubated:  Postinduction    Mask Ventilation:  Easy with oral airway    Attempts:  3    Attempted By:  Resident anesthesiologist    Method of Intubation:  Direct    Blade:  Cordova 2    Laryngeal View Grade: Grade III - only epiglottis visible      Attempted By (2nd Attempt):  Resident anesthesiologist    Method of Intubation (2nd Attempt):  Direct    Blade (2nd Attempt):  Rick 3    Laryngeal View Grade (2nd Attempt): Grade III - only epiglottis visible      Attempted By (3rd Attempt):  Resident anesthesiologist    Method of Intubation (3rd Attempt):  Video laryngoscopy    Blade (3rd Attempt):  Singh 3    Laryngeal View Grade (3rd Attempt): Grade I - full view of cords      Difficult Airway Encountered?: No      Complications:  None    Airway Device:  Oral endotracheal tube    Airway Device Size:  7.0    Style/Cuff Inflation:  Cuffed (inflated to minimal occlusive pressure)    Tube secured:  21    Secured at:  The lips    Placement Verified By:  Capnometry and Revisualization with laryngoscopy    Complicating Factors:  Anterior larynx    Findings Post-Intubation:  BS equal bilateral and atraumatic/condition of teeth unchanged

## 2025-08-01 NOTE — ANESTHESIA PROCEDURE NOTES
Arterial    Diagnosis: renal malignancy    Patient location during procedure: done in OR    Staffing  Authorizing Provider: Brett Cunningham DO  Performing Provider: Usama Lu MD    Staffing  Performed by: Usama Lu MD  Authorized by: Brett Cunningham DO    Anesthesiologist was present at the time of the procedure.    Preanesthetic Checklist  Completed: patient identified, IV checked, site marked, risks and benefits discussed, surgical consent, monitors and equipment checked, pre-op evaluation, timeout performed and anesthesia consent givenArterial  Skin Prep: chlorhexidine gluconate  Local Infiltration: none  Location: radial    Catheter Size: 20 G  Catheter placement by Anatomical landmarks. Heme positive aspiration all ports. Insertion Attempts: 1  Assessment  Dressing: secured with tape and tegaderm  Patient: Tolerated well

## 2025-08-01 NOTE — NURSING TRANSFER
Nursing Transfer Note      8/1/2025   12:53 PM    Nurse giving handoff: Fanta COWANU RN  Nurse receiving handoff: Dominga REYES RN    Transfer To: 501    Transfer via stretcher    Transfer with Iv pole/fluids    Transported by PCT    Order for Tele Monitor? No    Additional Lines: Mcintyre Catheter    Medicines sent: none    Any special needs or follow-up needed: no    Patient belongings transferred with patient: No    Chart send with patient: Yes    Notified: spouse    Patient reassessed at: 1249

## 2025-08-01 NOTE — ANESTHESIA PROCEDURE NOTES
B/L UYEN SS    Patient location during procedure: pre-op   Block not for primary anesthetic.  Reason for block: at surgeon's request and post-op pain management   Post-op Pain Location: b/l abdominal pain   Start time: 8/1/2025 6:42 AM  Timeout: 8/1/2025 6:40 AM   End time: 8/1/2025 6:47 AM    Staffing  Authorizing Provider: Steve Melgar MD  Performing Provider: Jose Sanchez MD    Staffing  Performed by: Jose Sanchez MD  Authorized by: Steve Melgar MD    Preanesthetic Checklist  Completed: patient identified, IV checked, site marked, risks and benefits discussed, surgical consent, monitors and equipment checked, pre-op evaluation and timeout performed  Peripheral Block  Patient position: sitting  Prep: ChloraPrep  Patient monitoring: heart rate, cardiac monitor, continuous pulse ox, continuous capnometry and frequent blood pressure checks  Block type: erector spinae plane  Laterality: bilateral  Injection technique: single shot  Interspace: T8-9    Needle  Needle type: Stimuplex   Needle gauge: 20 G  Needle length: 4 in  Needle localization: anatomical landmarks and ultrasound guidance   -ultrasound image captured on disc.  Assessment  Injection assessment: negative aspiration, negative parasthesia and local visualized surrounding nerve  Paresthesia pain: none  Heart rate change: no  Slow fractionated injection: yes  Pain Tolerance: comfortable throughout block and no complaints  Medications:    Medications: bupivacaine (pf) (MARCAINE) injection 0.75% - Perineural   30 mL - 8/1/2025 6:43:00 AM  cloNIDine injection 100 mcg/mL (epidural) - Perineural   100 mcg - 8/1/2025 6:43:00 AM    Additional Notes  100 mcg epinephrine and 50 mcg clonidine added to local anesthetic. Patient tolerated well.  See [facility] RN record for vitals.

## 2025-08-02 VITALS
WEIGHT: 154.31 LBS | HEART RATE: 75 BPM | RESPIRATION RATE: 17 BRPM | DIASTOLIC BLOOD PRESSURE: 69 MMHG | OXYGEN SATURATION: 94 % | SYSTOLIC BLOOD PRESSURE: 150 MMHG | HEIGHT: 66 IN | BODY MASS INDEX: 24.8 KG/M2 | TEMPERATURE: 99 F

## 2025-08-02 LAB
ABSOLUTE EOSINOPHIL (OHS): 0.04 K/UL
ABSOLUTE MONOCYTE (OHS): 0.39 K/UL (ref 0.3–1)
ABSOLUTE NEUTROPHIL COUNT (OHS): 5.11 K/UL (ref 1.8–7.7)
ANION GAP (OHS): 6 MMOL/L (ref 8–16)
BASOPHILS # BLD AUTO: 0.01 K/UL
BASOPHILS NFR BLD AUTO: 0.1 %
BUN SERPL-MCNC: 7 MG/DL (ref 8–23)
CALCIUM SERPL-MCNC: 7.7 MG/DL (ref 8.7–10.5)
CHLORIDE SERPL-SCNC: 112 MMOL/L (ref 95–110)
CO2 SERPL-SCNC: 23 MMOL/L (ref 23–29)
CREAT SERPL-MCNC: 0.9 MG/DL (ref 0.5–1.4)
ERYTHROCYTE [DISTWIDTH] IN BLOOD BY AUTOMATED COUNT: 14.2 % (ref 11.5–14.5)
GFR SERPLBLD CREATININE-BSD FMLA CKD-EPI: >60 ML/MIN/1.73/M2
GLUCOSE SERPL-MCNC: 85 MG/DL (ref 70–110)
HCT VFR BLD AUTO: 27.6 % (ref 37–48.5)
HGB BLD-MCNC: 8.4 GM/DL (ref 12–16)
IMM GRANULOCYTES # BLD AUTO: 0.03 K/UL (ref 0–0.04)
IMM GRANULOCYTES NFR BLD AUTO: 0.4 % (ref 0–0.5)
LYMPHOCYTES # BLD AUTO: 1.5 K/UL (ref 1–4.8)
MCH RBC QN AUTO: 27 PG (ref 27–31)
MCHC RBC AUTO-ENTMCNC: 30.4 G/DL (ref 32–36)
MCV RBC AUTO: 89 FL (ref 82–98)
NUCLEATED RBC (/100WBC) (OHS): 0 /100 WBC
PLATELET # BLD AUTO: 212 K/UL (ref 150–450)
PMV BLD AUTO: 10.4 FL (ref 9.2–12.9)
POTASSIUM SERPL-SCNC: 3.5 MMOL/L (ref 3.5–5.1)
RBC # BLD AUTO: 3.11 M/UL (ref 4–5.4)
RELATIVE EOSINOPHIL (OHS): 0.6 %
RELATIVE LYMPHOCYTE (OHS): 21.2 % (ref 18–48)
RELATIVE MONOCYTE (OHS): 5.5 % (ref 4–15)
RELATIVE NEUTROPHIL (OHS): 72.2 % (ref 38–73)
SODIUM SERPL-SCNC: 141 MMOL/L (ref 136–145)
WBC # BLD AUTO: 7.08 K/UL (ref 3.9–12.7)

## 2025-08-02 PROCEDURE — 36415 COLL VENOUS BLD VENIPUNCTURE: CPT

## 2025-08-02 PROCEDURE — 25000003 PHARM REV CODE 250

## 2025-08-02 PROCEDURE — 63600175 PHARM REV CODE 636 W HCPCS

## 2025-08-02 PROCEDURE — 80048 BASIC METABOLIC PNL TOTAL CA: CPT

## 2025-08-02 PROCEDURE — 25000003 PHARM REV CODE 250: Performed by: INTERNAL MEDICINE

## 2025-08-02 PROCEDURE — 85025 COMPLETE CBC W/AUTO DIFF WBC: CPT

## 2025-08-02 RX ORDER — METHOCARBAMOL 500 MG/1
500 TABLET, FILM COATED ORAL 4 TIMES DAILY PRN
Qty: 40 TABLET | Refills: 0 | Status: SHIPPED | OUTPATIENT
Start: 2025-08-02 | End: 2025-08-12

## 2025-08-02 RX ORDER — OXYCODONE HYDROCHLORIDE 5 MG/1
5 TABLET ORAL EVERY 4 HOURS PRN
Qty: 10 TABLET | Refills: 0 | Status: SHIPPED | OUTPATIENT
Start: 2025-08-02

## 2025-08-02 RX ORDER — PREGABALIN 100 MG/1
100 CAPSULE ORAL NIGHTLY
Qty: 30 CAPSULE | Refills: 0 | Status: SHIPPED | OUTPATIENT
Start: 2025-08-02 | End: 2025-09-01

## 2025-08-02 RX ORDER — ACETAMINOPHEN 500 MG
1000 TABLET ORAL EVERY 8 HOURS
Qty: 60 TABLET | Refills: 0 | Status: SHIPPED | OUTPATIENT
Start: 2025-08-02 | End: 2025-08-12

## 2025-08-02 RX ORDER — IBUPROFEN 800 MG/1
800 TABLET, FILM COATED ORAL EVERY 6 HOURS
Qty: 40 TABLET | Refills: 0 | Status: SHIPPED | OUTPATIENT
Start: 2025-08-02 | End: 2025-08-12

## 2025-08-02 RX ORDER — POLYETHYLENE GLYCOL 3350 17 G/17G
17 POWDER, FOR SOLUTION ORAL DAILY
Qty: 238 G | Refills: 0 | Status: SHIPPED | OUTPATIENT
Start: 2025-08-02 | End: 2025-08-16

## 2025-08-02 RX ADMIN — FAMOTIDINE 20 MG: 20 TABLET, FILM COATED ORAL at 09:08

## 2025-08-02 RX ADMIN — AMLODIPINE BESYLATE 2.5 MG: 2.5 TABLET ORAL at 09:08

## 2025-08-02 RX ADMIN — ACETAMINOPHEN 1000 MG: 500 TABLET ORAL at 06:08

## 2025-08-02 RX ADMIN — MUPIROCIN: 20 OINTMENT TOPICAL at 09:08

## 2025-08-02 RX ADMIN — ACETAMINOPHEN 1000 MG: 500 TABLET ORAL at 12:08

## 2025-08-02 RX ADMIN — HEPARIN SODIUM 5000 UNITS: 5000 INJECTION INTRAVENOUS; SUBCUTANEOUS at 06:08

## 2025-08-02 NOTE — PLAN OF CARE
[facility]  Discharge Final Note    Primary Care Provider: Marisol Law MD    Expected Discharge Date: 8/2/2025    Patient to be discharged home. Family to provide transportation home. Urology to schedule patient follow up appointment.    Future Appointments   Date Time Provider Department Center   9/3/2025 10:30 AM Marcell Suazo MD [facility] UROLOG [place]        Final Discharge Note (most recent)       Final Note - 08/02/25 0954          Final Note    Assessment Type Final Discharge Note     Anticipated Discharge Disposition Home or Self Care        Post-Acute Status    Post-Acute Authorization Other     Other Status No Post-Acute Service Needs     Discharge Delays None known at this time                     Important Message from Medicare

## 2025-08-02 NOTE — HPI
Polina Griffin is a 69 y.o. female with a left renal mass.      She has a past medical history of HTN, uterine fibroid s/p D&C, urge incontinence, and now left renal mass concerning for RCC.     Large left renal mass found incidentally on CT Abdomen pelvis done on 07/03/25.  Solid lobulated heterogeneously enhancing mass replacing the upper pole of the left kidney, extending into the renal sinus fat medially, and into the renal vein. There is a patent renal vein noted just anterior to the mass. The posterior aspect of the tumor crosses the posterior renal fascia. The collecting system is distorted and may be invaded. The IVC is patent.There is no adenopathy.      Patient reports she experience ache in left flank from time to time. Now experiencing nocturia 2-3x. She denies any hematuria, frequency, urgency.

## 2025-08-02 NOTE — DISCHARGE INSTRUCTIONS
What to expect with your Nephrectomy.  [facility]    After surgery  You may or may not have a drain that is shaped like a grenade. This should hold suction/negative pressure.  We may send this drain for a special test called serum Cr to test for a urine leak.  This drain may or may not come out on Post op day 1 prior to discharge. If you go home with a drain, the nurses will teach you how to record the output and you will come back 3-5 days after you leave to have the drain removed in clinic.  You will have a catheter in your bladder after your surgery. It should come out the next day and you should be able to urinate on your own before you leave.  If you are a male and on a BPH medicine such as Flomax, we will need to make sure you restart that the night of your surgery.  The night of surgery we expect and hope that you will:  Walk - walking helps get the bowels moving. Also after your surgery, you are at a risk for a deep venous thrombosis (which is a clot in the legs that can form by remaining inactive or still for extended periods of time) and this can travel to your lungs and make you feel short of breath. This is a very serious condition. Walking helps prevent a DVT from occurring.  Eat - you do not have to eat a whole meal, but we want to make sure you can tolerate liquid and/or solid food without nausea and vomiting  Use your incentive spirometer (breathing tool) - this is the breathing apparatus that helps you expand your lungs. If and when you have pain you will not want to take deep breaths. But if you dont take deep breaths, you are at risk for pneumonia. The incentive spirometer will help prevent this from occurring by expanding your lungs.    Symptoms you may experience immediately post-op:  Bloating and/or shoulder pain if you had a laparoscopic procedure - when we do this operation, we fill up your abdominal cavity with gas to better help us visualize the organs and allow our instruments to  fit. After the surgery, not all the air can be removed and your body will eventually absorb this small amount of air. However this can make you feel bloated. In addition, when you sit up, the air can sit right under a muscle (the diaphragm) which has connecting nerves to the shoulders, which could explain why you have shoulder pain.  Do not expect necessarily to have gas or to have a bowel movement - this goes along with the bloating, you may feel like you want to pass gas or have a bowel movement but you cant. This is normal and you will feel like this for a couple days. There are no pills to help with this. Small walks throughout the day should help with this.  Pain - your pain should be able to be controlled with medicines by mouth that we prescribe. It is important for you to tell us if you are on any pain medications at home before the surgery as you may need stronger pain meds while in the hospital.    You can go home when:  Pain is controlled with medicines by mouth  You are able to walk without difficulty or pain  You are tolerating a regulating diet  Your are voiding. If you cannot void we may have to replace a catheter and you will follow up 3-5 days after you leave to have a voiding trial. The nurses will teach you how to care for your catheter.    When you go home:  Activity  Continue to walk - small walks throughout the day are better than one long walk.   Do not lift anything greater than 8 pounds for 6 weeks - we want your abdominal wall muscles to heal.  Bowel Movements - Do not strain to have a bowel movement - the pain medicines will make you constipated. That is why we also ask you to take colace 2-3 x per day to help keep your bowels regular. If you are still having trouble, then you can also add Miralax once a day. Do not take any stool softeners if you are having diarrhea.  Drain - If you have a drain (not your catheter, but a separate drain) then record the output and bring it with you to  your next appointment  Smoking - If you smoke, we encourage you STOP. Smoking interferes with the healing process and your prolong your healing with continued smoking.  Driving - Do not drive while you are on pain meds or with your catheter in place.  Bathing - If you do not have a drain, you can shower 48 hours after your surgery. If you do have a drain, sponge bathe only until the drain is out.  Dressing - you can remove the dressings if there is no drainage or change them as needed if there is. The little sterile band-aid strips will fall off on their own in 10-14 days. If they have not fallen off then you can remove them yourself.  Restarting medicines -especially blood thinners (Aspirin, Plavix, Coumadin), Fish Oil. Discuss this with your physician prior to discharge.    When to return to the ER  Fever - If you have a fever >101.5, this could be due to a number of reasons such as infection of the urine or incision. If your catheter has been removed, you could possibly have a leak. It would be best to come to the ER so they can better evaluate you.  Severe pain - pain is expected, but severe or new onset of pain is not normal.   Inability to tolerate food or liquid with nausea and vomiting - it would be best to go to the ER for them to better evaluate you.

## 2025-08-02 NOTE — SUBJECTIVE & OBJECTIVE
Interval History: NAEON. Afebrile, hemodynamically stable. 1.3L of CYU. Patient reports no nausea or vomiting with PO intake. Has started ambulating. Belly soft. Mcintyre removed at bedside. Cr .9, near baseline of .6 - .8.    Review of Systems per HPI  Objective:     Temp:  [32 °F (0 °C)-98.6 °F (37 °C)] 98.6 °F (37 °C)  Pulse:  [60-82] 76  Resp:  [10-27] 16  SpO2:  [94 %-100 %] 95 %  BP: (112-162)/(55-70) 162/70     Body mass index is 24.91 kg/m².           Drains       Drain  Duration                  Urethral Catheter 08/01/25 0709 Silicone 16 Fr. 1 day                     Physical Exam  Constitutional:       Appearance: Normal appearance.   Eyes:      Pupils: Pupils are equal, round, and reactive to light.   Cardiovascular:      Rate and Rhythm: Normal rate and regular rhythm.      Pulses: Normal pulses.   Pulmonary:      Effort: Pulmonary effort is normal.      Breath sounds: Normal breath sounds.   Abdominal:      Palpations: Abdomen is soft.      Comments: Incision sites clean, dry, and intact with dermabond and suture in place  Abdomen soft in all 4 quadrants   Genitourinary:     Comments: Mcintyre removed at bedside  Musculoskeletal:         General: Normal range of motion.      Cervical back: Normal range of motion.   Skin:     General: Skin is warm.      Capillary Refill: Capillary refill takes less than 2 seconds.   Neurological:      Mental Status: She is alert and oriented to person, place, and time.   Psychiatric:         Behavior: Behavior normal.           Significant Labs:    BMP:  Recent Labs   Lab 07/28/25  1039 08/02/25  0508    141   K 4.1 3.5    112*   CO2 27 23   BUN 7* 7*   CREATININE 0.6 0.9   CALCIUM 9.5 7.7*       CBC:   Recent Labs   Lab 07/28/25  1039 08/02/25  0508   WBC 5.41 7.08   HGB 10.3* 8.4*   HCT 34.2* 27.6*    212       All pertinent labs results from the past 24 hours have been reviewed.    Significant Imaging:  All pertinent imaging results/findings from the  past 24 hours have been reviewed.

## 2025-08-02 NOTE — PROGRESS NOTES
[facility]  Urology  Progress Note    Patient Name: Polina Griffin  MRN: [id number]  Admission Date: 8/1/2025  Hospital Length of Stay: 1 days  Code Status: Full Code   Attending Provider: Marcell Suazo MD   Primary Care Physician: Marisol Law MD    Subjective:     HPI:  No notes on file    Interval History: NAEON. Afebrile, hemodynamically stable. 1.3L of CYU. Patient reports no nausea or vomiting with PO intake. Has started ambulating. Belly soft. Mcintyre removed at bedside. Cr .9, near baseline of .6 - .8.    Review of Systems per HPI  Objective:     Temp:  [32 °F (0 °C)-98.6 °F (37 °C)] 98.6 °F (37 °C)  Pulse:  [60-82] 76  Resp:  [10-27] 16  SpO2:  [94 %-100 %] 95 %  BP: (112-162)/(55-70) 162/70     Body mass index is 24.91 kg/m².           Drains       Drain  Duration                  Urethral Catheter 08/01/25 0709 Silicone 16 Fr. 1 day                     Physical Exam  Constitutional:       Appearance: Normal appearance.   Eyes:      Pupils: Pupils are equal, round, and reactive to light.   Cardiovascular:      Rate and Rhythm: Normal rate and regular rhythm.      Pulses: Normal pulses.   Pulmonary:      Effort: Pulmonary effort is normal.      Breath sounds: Normal breath sounds.   Abdominal:      Palpations: Abdomen is soft.      Comments: Incision sites clean, dry, and intact with dermabond and suture in place  Abdomen soft in all 4 quadrants   Genitourinary:     Comments: Mcintyre removed at bedside  Musculoskeletal:         General: Normal range of motion.      Cervical back: Normal range of motion.   Skin:     General: Skin is warm.      Capillary Refill: Capillary refill takes less than 2 seconds.   Neurological:      Mental Status: She is alert and oriented to person, place, and time.   Psychiatric:         Behavior: Behavior normal.           Significant Labs:    BMP:  Recent Labs   Lab 07/28/25  1039 08/02/25  0508    141   K 4.1 3.5    112*   CO2 27 23   BUN 7* 7*    CREATININE 0.6 0.9   CALCIUM 9.5 7.7*       CBC:   Recent Labs   Lab 07/28/25  1039 08/02/25  0508   WBC 5.41 7.08   HGB 10.3* 8.4*   HCT 34.2* 27.6*    212       All pertinent labs results from the past 24 hours have been reviewed.    Significant Imaging:  All pertinent imaging results/findings from the past 24 hours have been reviewed.                  Assessment/Plan:     * Left renal mass  Polina Griffin is a 69 y.o. with a left renal mass s/p robotic nephrectomy on 08/01/25 with .    - Progressing appropriately for post-operative period  - Ambulated  - Diet: regular diet  - Strict I's and O's  - DVT ppx: Heparin  - Mcintyre removed at bedside - void trial, will follow up  - Fluids at 125 will decrease to off  - Home meds reviewed and reconciled  - Encourage ambulation  - [facility]    Dispo: discharge home later today once pass void trial, tolerate diet, and ambulate in halls         VTE Risk Mitigation (From admission, onward)           Ordered     heparin (porcine) injection 5,000 Units  Every 8 hours         08/01/25 1037     Place sequential compression device  Until discontinued         08/01/25 1037     Place KATE hose  Until discontinued         08/01/25 1037                    Eamon Silverio MD  Urology  [facility] - Surgery

## 2025-08-02 NOTE — ASSESSMENT & PLAN NOTE
Polina Griffin is a 69 y.o. with a left renal mass s/p robotic nephrectomy on 08/01/25 with .    - Progressing appropriately for post-operative period  - Ambulated  - Diet: regular diet  - Strict I's and O's  - DVT ppx: Heparin  - Mcintyre removed at bedside - void trial, will follow up  - Fluids at 125 will decrease to off  - Home meds reviewed and reconciled  - Encourage ambulation  - [facility]    Dispo: discharge home later today once pass void trial, tolerate diet, and ambulate in halls

## 2025-08-02 NOTE — ANESTHESIA POSTPROCEDURE EVALUATION
Anesthesia Post Evaluation    Patient: Polina Griffin    Procedure(s) Performed: Procedure(s) (LRB):  XI ROBOTIC NEPHRECTOMY, RADICAL (Left)    Final Anesthesia Type: general      Patient location during evaluation: PACU  Patient participation: Yes- Able to Participate  Level of consciousness: oriented and awake and alert  Post-procedure vital signs: reviewed and stable  Pain management: adequate  Airway patency: patent    PONV status at discharge: No PONV  Anesthetic complications: no      Cardiovascular status: blood pressure returned to baseline  Respiratory status: unassisted, spontaneous ventilation and room air  Hydration status: euvolemic  Follow-up not needed.              Vitals Value Taken Time   /69 08/02/25 11:13   Temp 37.1 °C (98.7 °F) 08/02/25 11:13   Pulse 75 08/02/25 11:13   Resp 17 08/02/25 11:13   SpO2 94 % 08/02/25 11:13         Event Time   Out of Recovery 10:58:00         Pain/Stephanie Score: Pain Rating Prior to Med Admin: 2 (8/2/2025 12:50 PM)  Pain Rating Post Med Admin: 1 (8/1/2025  5:44 PM)  Stephanie Score: 8 (8/1/2025 10:40 AM)

## 2025-08-02 NOTE — PLAN OF CARE
IVS dcd intact , D/C instructions given , pt and wife verbalized understanding, pt  picked up d/c meds, vss, no distress, pt dcd via w/c with staff and family

## 2025-08-02 NOTE — PLAN OF CARE
Problem: Adult Inpatient Plan of Care  Goal: Plan of Care Review  Outcome: Progressing  Goal: Patient-Specific Goal (Individualized)  Outcome: Progressing  Goal: Absence of Hospital-Acquired Illness or Injury  Outcome: Progressing  Intervention: Identify and Manage Fall Risk  Flowsheets (Taken 8/2/2025 0428)  Safety Promotion/Fall Prevention:   assistive device/personal item within reach   side rails raised x 2   nonskid shoes/socks when out of bed   instructed to call staff for mobility   patient expresses understanding of fall risk and prevention  Intervention: Prevent Skin Injury  Flowsheets (Taken 8/2/2025 0428)  Body Position: position changed independently  Skin Protection: incontinence pads utilized  Device Skin Pressure Protection: adhesive use limited  Goal: Optimal Comfort and Wellbeing  Outcome: Progressing     Problem: Hospitalized Older Adult  Goal: Optimal Cognitive Function  Outcome: Progressing  Goal: Optimal Coping  Outcome: Progressing     Problem: Wound  Goal: Optimal Coping  Outcome: Progressing

## 2025-08-02 NOTE — PROGRESS NOTES
[facility]  Urology  Progress Note    Patient Name: Polina Griffin  MRN: [id number]  Admission Date: 8/1/2025  Hospital Length of Stay: 1 days  Code Status: Full Code   Attending Provider: Marcell Suazo MD   Primary Care Physician: Marisol Law MD    Subjective:     HPI:   Polina Griffin is a 69 y.o. female with a left renal mass.      She has a past medical history of HTN, uterine fibroid s/p D&C, urge incontinence, and now left renal mass concerning for RCC.     Large left renal mass found incidentally on CT Abdomen pelvis done on 07/03/25.  Solid lobulated heterogeneously enhancing mass replacing the upper pole of the left kidney, extending into the renal sinus fat medially, and into the renal vein. There is a patent renal vein noted just anterior to the mass. The posterior aspect of the tumor crosses the posterior renal fascia. The collecting system is distorted and may be invaded. The IVC is patent.There is no adenopathy.      Patient reports she experience ache in left flank from time to time. Now experiencing nocturia 2-3x. She denies any hematuria, frequency, urgency.     Interval History: NAEON. Afebrile, hemodynamically stable. 1.3L of CYU. Patient reports no nausea or vomiting with PO intake. Has started ambulating. Belly soft. Mcintyre removed at bedside. Cr .9, near baseline of .6 - .8.    Review of Systems per HPI  Objective:     Temp:  [32 °F (0 °C)-98.6 °F (37 °C)] 98.6 °F (37 °C)  Pulse:  [60-82] 76  Resp:  [10-27] 16  SpO2:  [94 %-100 %] 95 %  BP: (112-162)/(55-70) 162/70     Body mass index is 24.91 kg/m².           Drains       Drain  Duration                  Urethral Catheter 08/01/25 0709 Silicone 16 Fr. 1 day                     Physical Exam  Constitutional:       Appearance: Normal appearance.   Eyes:      Pupils: Pupils are equal, round, and reactive to light.   Cardiovascular:      Rate and Rhythm: Normal rate and regular rhythm.      Pulses: Normal pulses.   Pulmonary:       Effort: Pulmonary effort is normal.      Breath sounds: Normal breath sounds.   Abdominal:      Palpations: Abdomen is soft.      Comments: Incision sites clean, dry, and intact with dermabond and suture in place  Abdomen soft in all 4 quadrants   Genitourinary:     Comments: Mcintyre removed at bedside  Musculoskeletal:         General: Normal range of motion.      Cervical back: Normal range of motion.   Skin:     General: Skin is warm.      Capillary Refill: Capillary refill takes less than 2 seconds.   Neurological:      Mental Status: She is alert and oriented to person, place, and time.   Psychiatric:         Behavior: Behavior normal.           Significant Labs:    BMP:  Recent Labs   Lab 07/28/25  1039 08/02/25  0508    141   K 4.1 3.5    112*   CO2 27 23   BUN 7* 7*   CREATININE 0.6 0.9   CALCIUM 9.5 7.7*       CBC:   Recent Labs   Lab 07/28/25  1039 08/02/25  0508   WBC 5.41 7.08   HGB 10.3* 8.4*   HCT 34.2* 27.6*    212       All pertinent labs results from the past 24 hours have been reviewed.    Significant Imaging:  All pertinent imaging results/findings from the past 24 hours have been reviewed.                  Assessment/Plan:     * Left renal mass  Polina Griffin is a 69 y.o. with a left renal mass s/p robotic nephrectomy on 08/01/25 with .    - Progressing appropriately for post-operative period  - Ambulated  - Diet: regular diet  - Strict I's and O's  - DVT ppx: Heparin  - Mcintyre removed at bedside - void trial, will follow up  - Fluids at 125 will decrease to off  - Home meds reviewed and reconciled  - Encourage ambulation  - [facility]    Dispo: discharge home later today once pass void trial, tolerate diet, and ambulate in halls         VTE Risk Mitigation (From admission, onward)           Ordered     heparin (porcine) injection 5,000 Units  Every 8 hours         08/01/25 1037     Place sequential compression device  Until discontinued         08/01/25 1037     Place KATE hose   Until discontinued         08/01/25 [facility]                    Eamon Silverio MD  Urology  [facility]

## 2025-08-02 NOTE — DISCHARGE SUMMARY
"[facility] - Surgery  DISCHARGE SUMMARY  General Surgery      Admit Date:  8/1/2025    Discharge Date and Time:  8/2/2025  12:00 PM    Attending Physician:  Marcell Suazo MD     Discharge Provider:  Eamon Silverio MD     Reason for Admission:  Left renal mass     Procedures Performed:  Procedure(s) (LRB):  XI ROBOTIC NEPHRECTOMY, RADICAL (Left)    Hospital Course:  Please see the preoperative H&P and other available documentation for full details related to history prior to this admission.  Briefly, Polina Griffin is a 69 y.o. female who was admitted following scheduled elective surgery for Left renal mass    Following a complete preoperative discussion of the risks and benefits of surgery with signed informed consent, the patient was taken to the operating room on 8/1/2025 and underwent the above stated procedures.  The patient tolerated surgery well and there were no complications.  Please see the operative report for full intraoperative findings and details.  Postoperatively, the patient did well and was transferred from the PACU to the floor in stable condition where they had a stable and uncomplicated hospital course.  Labs and vital signs remained stable and appropriate throughout course.  Diet was advanced as tolerated and the patient's pain was controlled on oral pain medications without problem.  Currently, the patient is doing well at 1 Day Post-Op and is stable and appropriate for discharge home at this time.  She is doing incredibly well this morning. She will follow up outpatient with Dr. Suazo.    Consults:  None.    Significant Diagnostic Studies:   Recent Labs   Lab 08/02/25  0508   WBC 7.08   HGB 8.4*   HCT 27.6*        Recent Labs   Lab 08/02/25  0508      K 3.5   *   CO2 23   BUN 7*   CREATININE 0.9   GLU 85   CALCIUM 7.7*   No results for input(s): "INR", "PTT", "LABHEPA", "LACTATE", "TROPONINI", "CPK", "CPKMB", "MB", "BNP" in the last 72 hours.No results for input(s): "PH", " ""PCO2", "PO2", "HCO3" in the last 72 hours.      Final Diagnoses:   Principal Problem:  Left renal mass   Secondary Diagnoses:    Active Hospital Problems    Diagnosis  POA    *Left renal mass [N28.89]  Yes      Resolved Hospital Problems   No resolved problems to display.       Discharged Condition:  Good    Disposition:  Home or Self Care    Follow Up/Patient Instructions:   What to expect with your Nephrectomy.  [facility]  Updated June 2023    After surgery  You may or may not have a drain that is shaped like a grenade. This should hold suction/negative pressure.  We may send this drain for a special test called serum Cr to test for a urine leak.  This drain may or may not come out on Post op day 1 prior to discharge. If you go home with a drain, the nurses will teach you how to record the output and you will come back 3-5 days after you leave to have the drain removed in clinic.  You will have a catheter in your bladder after your surgery. It should come out the next day and you should be able to urinate on your own before you leave.  If you are a male and on a BPH medicine such as Flomax, we will need to make sure you restart that the night of your surgery.  The night of surgery we expect and hope that you will:  Walk - walking helps get the bowels moving. Also after your surgery, you are at a risk for a deep venous thrombosis (which is a clot in the legs that can form by remaining inactive or still for extended periods of time) and this can travel to your lungs and make you feel short of breath. This is a very serious condition. Walking helps prevent a DVT from occurring.  Eat - you do not have to eat a whole meal, but we want to make sure you can tolerate liquid and/or solid food without nausea and vomiting  Use your incentive spirometer (breathing tool) - this is the breathing apparatus that helps you expand your lungs. If and when you have pain you will not want to take deep breaths. But if you dont " take deep breaths, you are at risk for pneumonia. The incentive spirometer will help prevent this from occurring by expanding your lungs.    Symptoms you may experience immediately post-op:  Bloating and/or shoulder pain if you had a laparoscopic procedure - when we do this operation, we fill up your abdominal cavity with gas to better help us visualize the organs and allow our instruments to fit. After the surgery, not all the air can be removed and your body will eventually absorb this small amount of air. However this can make you feel bloated. In addition, when you sit up, the air can sit right under a muscle (the diaphragm) which has connecting nerves to the shoulders, which could explain why you have shoulder pain.  Do not expect necessarily to have gas or to have a bowel movement - this goes along with the bloating, you may feel like you want to pass gas or have a bowel movement but you cant. This is normal and you will feel like this for a couple days. There are no pills to help with this. Small walks throughout the day should help with this.  Pain - your pain should be able to be controlled with medicines by mouth that we prescribe. It is important for you to tell us if you are on any pain medications at home before the surgery as you may need stronger pain meds while in the hospital.    You can go home when:  Pain is controlled with medicines by mouth  You are able to walk without difficulty or pain  You are tolerating a regulating diet  Your are voiding. If you cannot void we may have to replace a catheter and you will follow up 3-5 days after you leave to have a voiding trial. The nurses will teach you how to care for your catheter.    When you go home:  Activity  Continue to walk - small walks throughout the day are better than one long walk.   Do not lift anything greater than 8 pounds for 6 weeks - we want your abdominal wall muscles to heal.  Bowel Movements - Do not strain to have a bowel movement  - the pain medicines will make you constipated. That is why we also ask you to take colace 2-3 x per day to help keep your bowels regular. If you are still having trouble, then you can also add Miralax once a day. Do not take any stool softeners if you are having diarrhea.  Drain - If you have a drain (not your catheter, but a separate drain) then record the output and bring it with you to your next appointment  Smoking - If you smoke, we encourage you STOP. Smoking interferes with the healing process and your prolong your healing with continued smoking.  Driving - Do not drive while you are on pain meds or with your catheter in place.  Bathing - If you do not have a drain, you can shower 48 hours after your surgery. If you do have a drain, sponge bathe only until the drain is out.  Dressing - you can remove the dressings if there is no drainage or change them as needed if there is. The little sterile band-aid strips will fall off on their own in 10-14 days. If they have not fallen off then you can remove them yourself.  Restarting medicines -especially blood thinners (Aspirin, Plavix, Coumadin), Fish Oil. Discuss this with your physician prior to discharge.    When to return to the ER  Fever - If you have a fever >101.5, this could be due to a number of reasons such as infection of the urine or incision. If your catheter has been removed, you could possibly have a leak. It would be best to come to the ER so they can better evaluate you.  Severe pain - pain is expected, but severe or new onset of pain is not normal.   Inability to tolerate food or liquid with nausea and vomiting - it would be best to go to the ER for them to better evaluate you.           Medications:  Reconciled Home Medications:    Current Discharge Medication List        START taking these medications    Details   !! acetaminophen (TYLENOL) 500 MG tablet Take 2 tablets (1,000 mg total) by mouth every 8 (eight) hours. for 10 days  Qty: 60 tablet,  Refills: 0      ibuprofen (ADVIL,MOTRIN) 800 MG tablet Take 1 tablet (800 mg total) by mouth every 6 (six) hours. for 10 days  Qty: 40 tablet, Refills: 0      methocarbamoL (ROBAXIN) 500 MG Tab Take 1 tablet (500 mg total) by mouth 4 (four) times daily as needed.  Qty: 40 tablet, Refills: 0      oxyCODONE (ROXICODONE) 5 MG immediate release tablet Take 1 tablet (5 mg total) by mouth every 4 (four) hours as needed for Pain.  Qty: 10 tablet, Refills: 0    Associated Diagnoses: Renal mass      polyethylene glycol (GLYCOLAX) 17 gram/dose powder Take 17 g by mouth once daily. for 10 days  Qty: 10 each, Refills: 0      pregabalin (LYRICA) 100 MG capsule Take 1 capsule (100 mg total) by mouth every evening.  Qty: 30 capsule, Refills: 0    Associated Diagnoses: Renal mass       !! - Potential duplicate medications found. Please discuss with provider.        CONTINUE these medications which have NOT CHANGED    Details   !! acetaminophen (TYLENOL EXTRA STRENGTH ORAL) Take 500 mg by mouth 2 (two) times a day.      amLODIPine (NORVASC) 2.5 MG tablet Take 1 tablet (2.5 mg total) by mouth once daily.  Qty: 90 tablet, Refills: 3    Comments: .  Associated Diagnoses: Hypertension, unspecified type      BENEFIBER, GUAR GUM, ORAL Take by mouth once daily. 3 Gummies a day      CALCIUM PHOSPHATE TRIB/VIT D3 (CITRACAL + D3, CALCIUM PHOS, ORAL) Take by mouth.      ciprofloxacin HCl (CIPRO) 500 MG tablet Take 1 tablet (500 mg total) by mouth 2 (two) times daily. for 3 days  Qty: 6 tablet, Refills: 0      coenzyme Q10 100 mg capsule Take 100 mg by mouth once daily.      ferrous sulfate 325 (65 FE) MG EC tablet Take 1 tablet (325 mg total) by mouth once daily.  Qty: 30 tablet, Refills: 0    Associated Diagnoses: Abnormal iron saturation; Abnormal uterine bleeding      fluticasone (FLONASE) 50 mcg/actuation nasal spray 1 spray by Each Nostril route once daily.      multivitamin capsule Take 1 capsule by mouth once daily.      vit  C,M-Mc-cszto-lutein-zeaxan (PRESERVISION AREDS 2) 250-90-40-1 mg Cap Take 1 tablet by mouth once daily.      amoxicillin-clavulanate 875-125mg (AUGMENTIN) 875-125 mg per tablet Take 1 tablet by mouth 2 (two) times daily.  Qty: 6 tablet, Refills: 0    Comments: Please start taking today       !! - Potential duplicate medications found. Please discuss with provider.        Discharge Procedure Orders   Comprehensive Metabolic Panel   Standing Status: Future Number of Occurrences: 1 Standing Exp. Date: 09/21/26     Order Specific Question Answer Comments   Send normal result to authorizing provider's In Basket if patient is active on MyChart: Yes      CBC Without Differential   Standing Status: Future Number of Occurrences: 1 Standing Exp. Date: 09/21/26     Order Specific Question Answer Comments   Send normal result to authorizing provider's In Basket if patient is active on MyChart: Yes      EKG 12-lead   Standing Status: Future Number of Occurrences: 1 Standing Exp. Date: 07/23/26     Type & Screen   Standing Status: Future Number of Occurrences: 1 Standing Exp. Date: 09/21/26         Eamon Silverio MD

## 2025-08-04 ENCOUNTER — PATIENT MESSAGE (OUTPATIENT)
Dept: ADMINISTRATIVE | Facility: HOSPITAL | Age: 70
End: 2025-08-04
Payer: MEDICARE

## 2025-08-04 ENCOUNTER — PATIENT OUTREACH (OUTPATIENT)
Dept: ADMINISTRATIVE | Facility: CLINIC | Age: 70
End: 2025-08-04
Payer: MEDICARE

## 2025-08-04 LAB
GLUCOSE SERPL-MCNC: 138 MG/DL (ref 70–110)
GLUCOSE SERPL-MCNC: 156 MG/DL (ref 70–110)
HCO3 UR-SCNC: 22.9 MMOL/L (ref 24–28)
HCO3 UR-SCNC: 25.4 MMOL/L (ref 24–28)
HCT VFR BLD CALC: 20 %PCV (ref 36–54)
HCT VFR BLD CALC: 26 %PCV (ref 36–54)
PCO2 BLDA: 45.7 MMHG (ref 35–45)
PCO2 BLDA: 46.3 MMHG (ref 35–45)
PH SMN: 7.3 [PH] (ref 7.35–7.45)
PH SMN: 7.35 [PH] (ref 7.35–7.45)
PO2 BLDA: 76 MMHG (ref 80–100)
PO2 BLDA: 85 MMHG (ref 80–100)
POC BE: -4 MMOL/L (ref -2–2)
POC BE: 0 MMOL/L (ref -2–2)
POC IONIZED CALCIUM: 1.09 MMOL/L (ref 1.06–1.42)
POC IONIZED CALCIUM: 1.13 MMOL/L (ref 1.06–1.42)
POC SATURATED O2: 93 % (ref 95–100)
POC SATURATED O2: 96 % (ref 95–100)
POTASSIUM BLD-SCNC: 3.6 MMOL/L (ref 3.5–5.1)
POTASSIUM BLD-SCNC: 4 MMOL/L (ref 3.5–5.1)
SAMPLE: ABNORMAL
SAMPLE: ABNORMAL
SODIUM BLD-SCNC: 144 MMOL/L (ref 136–145)
SODIUM BLD-SCNC: 145 MMOL/L (ref 136–145)

## 2025-08-05 LAB
ABO + RH BLD: NORMAL
ABO + RH BLD: NORMAL
BLD PROD TYP BPU: NORMAL
BLD PROD TYP BPU: NORMAL
BLOOD UNIT EXPIRATION DATE: NORMAL
BLOOD UNIT EXPIRATION DATE: NORMAL
BLOOD UNIT TYPE CODE: 5100
BLOOD UNIT TYPE CODE: 5100
CROSSMATCH INTERPRETATION: NORMAL
CROSSMATCH INTERPRETATION: NORMAL
DISPENSE STATUS: NORMAL
DISPENSE STATUS: NORMAL
UNIT NUMBER: NORMAL
UNIT NUMBER: NORMAL

## 2025-08-07 ENCOUNTER — TELEPHONE (OUTPATIENT)
Dept: HEMATOLOGY/ONCOLOGY | Facility: CLINIC | Age: 70
End: 2025-08-07
Payer: MEDICARE

## 2025-08-07 LAB
DHEA SERPL-MCNC: ABNORMAL
ESTROGEN SERPL-MCNC: ABNORMAL PG/ML
INSULIN SERPL-ACNC: ABNORMAL U[IU]/ML
LAB AP CLINICAL INFORMATION: ABNORMAL
LAB AP DIAGNOSIS CATEGORY: ABNORMAL
LAB AP GROSS DESCRIPTION: ABNORMAL
LAB AP PERFORMING LOCATION(S): ABNORMAL
LAB AP REPORT FOOTNOTES: ABNORMAL
LAB AP SYNOPTIC CHECKLIST: ABNORMAL
T3RU NFR SERPL: ABNORMAL %

## 2025-08-08 NOTE — PHYSICIAN QUERY
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

## 2025-08-15 ENCOUNTER — OFFICE VISIT (OUTPATIENT)
Dept: INTERVENTIONAL RADIOLOGY/VASCULAR | Facility: CLINIC | Age: 70
End: 2025-08-15
Payer: MEDICARE

## 2025-08-15 ENCOUNTER — PATIENT MESSAGE (OUTPATIENT)
Dept: UROLOGY | Facility: CLINIC | Age: 70
End: 2025-08-15
Payer: MEDICARE

## 2025-08-15 DIAGNOSIS — N95.0 POSTMENOPAUSAL BLEEDING: Primary | ICD-10-CM

## 2025-09-03 ENCOUNTER — PATIENT MESSAGE (OUTPATIENT)
Dept: HEMATOLOGY/ONCOLOGY | Facility: CLINIC | Age: 70
End: 2025-09-03

## 2025-09-03 ENCOUNTER — OFFICE VISIT (OUTPATIENT)
Dept: UROLOGY | Facility: CLINIC | Age: 70
End: 2025-09-03
Payer: MEDICARE

## 2025-09-03 ENCOUNTER — OFFICE VISIT (OUTPATIENT)
Dept: HEMATOLOGY/ONCOLOGY | Facility: CLINIC | Age: 70
End: 2025-09-03
Payer: MEDICARE

## 2025-09-03 VITALS
WEIGHT: 155.19 LBS | BODY MASS INDEX: 24.94 KG/M2 | HEART RATE: 95 BPM | DIASTOLIC BLOOD PRESSURE: 83 MMHG | HEIGHT: 66 IN | SYSTOLIC BLOOD PRESSURE: 153 MMHG

## 2025-09-03 VITALS
WEIGHT: 152.25 LBS | DIASTOLIC BLOOD PRESSURE: 79 MMHG | BODY MASS INDEX: 24.47 KG/M2 | RESPIRATION RATE: 18 BRPM | HEART RATE: 94 BPM | SYSTOLIC BLOOD PRESSURE: 155 MMHG | OXYGEN SATURATION: 98 % | HEIGHT: 66 IN

## 2025-09-03 DIAGNOSIS — Z79.899 LONG TERM CURRENT USE OF THERAPEUTIC DRUG: ICD-10-CM

## 2025-09-03 DIAGNOSIS — C64.2 RENAL CELL CARCINOMA OF LEFT KIDNEY: Primary | ICD-10-CM

## 2025-09-03 DIAGNOSIS — I10 PRIMARY HYPERTENSION: ICD-10-CM

## 2025-09-03 DIAGNOSIS — C64.9 RENAL CELL CARCINOMA, UNSPECIFIED LATERALITY: Primary | ICD-10-CM

## 2025-09-03 PROBLEM — L60.0 INGROWN NAIL: Status: RESOLVED | Noted: 2024-07-27 | Resolved: 2025-09-03

## 2025-09-03 PROBLEM — R07.89 NON-CARDIAC CHEST PAIN: Status: RESOLVED | Noted: 2018-05-03 | Resolved: 2025-09-03

## 2025-09-03 PROBLEM — L85.3 XEROSIS OF SKIN: Status: RESOLVED | Noted: 2024-07-27 | Resolved: 2025-09-03

## 2025-09-03 PROCEDURE — 1126F AMNT PAIN NOTED NONE PRSNT: CPT | Mod: CPTII,S$GLB,, | Performed by: UROLOGY

## 2025-09-03 PROCEDURE — 1157F ADVNC CARE PLAN IN RCRD: CPT | Mod: CPTII,S$GLB,, | Performed by: UROLOGY

## 2025-09-03 PROCEDURE — 3008F BODY MASS INDEX DOCD: CPT | Mod: CPTII,S$GLB,, | Performed by: HOSPITALIST

## 2025-09-03 PROCEDURE — 1157F ADVNC CARE PLAN IN RCRD: CPT | Mod: CPTII,S$GLB,, | Performed by: HOSPITALIST

## 2025-09-03 PROCEDURE — 1101F PT FALLS ASSESS-DOCD LE1/YR: CPT | Mod: CPTII,S$GLB,, | Performed by: UROLOGY

## 2025-09-03 PROCEDURE — 3288F FALL RISK ASSESSMENT DOCD: CPT | Mod: CPTII,S$GLB,, | Performed by: UROLOGY

## 2025-09-03 PROCEDURE — 99999 PR PBB SHADOW E&M-EST. PATIENT-LVL IV: CPT | Mod: PBBFAC,,, | Performed by: HOSPITALIST

## 2025-09-03 PROCEDURE — 3288F FALL RISK ASSESSMENT DOCD: CPT | Mod: CPTII,S$GLB,, | Performed by: HOSPITALIST

## 2025-09-03 PROCEDURE — 3078F DIAST BP <80 MM HG: CPT | Mod: CPTII,S$GLB,, | Performed by: HOSPITALIST

## 2025-09-03 PROCEDURE — 99999 PR PBB SHADOW E&M-EST. PATIENT-LVL III: CPT | Mod: PBBFAC,,, | Performed by: UROLOGY

## 2025-09-03 PROCEDURE — 3079F DIAST BP 80-89 MM HG: CPT | Mod: CPTII,S$GLB,, | Performed by: UROLOGY

## 2025-09-03 PROCEDURE — 99024 POSTOP FOLLOW-UP VISIT: CPT | Mod: S$GLB,,, | Performed by: UROLOGY

## 2025-09-03 PROCEDURE — 3077F SYST BP >= 140 MM HG: CPT | Mod: CPTII,S$GLB,, | Performed by: UROLOGY

## 2025-09-03 PROCEDURE — 1126F AMNT PAIN NOTED NONE PRSNT: CPT | Mod: CPTII,S$GLB,, | Performed by: HOSPITALIST

## 2025-09-03 PROCEDURE — G2211 COMPLEX E/M VISIT ADD ON: HCPCS | Mod: S$GLB,,, | Performed by: HOSPITALIST

## 2025-09-03 PROCEDURE — 1101F PT FALLS ASSESS-DOCD LE1/YR: CPT | Mod: CPTII,S$GLB,, | Performed by: HOSPITALIST

## 2025-09-03 PROCEDURE — 3077F SYST BP >= 140 MM HG: CPT | Mod: CPTII,S$GLB,, | Performed by: HOSPITALIST

## 2025-09-03 PROCEDURE — 99205 OFFICE O/P NEW HI 60 MIN: CPT | Mod: S$GLB,,, | Performed by: HOSPITALIST

## 2025-09-03 PROCEDURE — 1159F MED LIST DOCD IN RCRD: CPT | Mod: CPTII,S$GLB,, | Performed by: HOSPITALIST

## (undated) DEVICE — PORT AIRSEAL 12/120MM LPI

## (undated) DEVICE — PENCIL ROCKER SWITCH 10FT CORD

## (undated) DEVICE — TRAY MINOR GEN SURG OMC

## (undated) DEVICE — COVER TIP CURVED SCISSORS XI

## (undated) DEVICE — Device

## (undated) DEVICE — STAPLER INTERNAL 30XOD8MM

## (undated) DEVICE — SEALER VESSEL EXTEND

## (undated) DEVICE — ELECTRODE MEGADYNE RETURN DUAL

## (undated) DEVICE — SOL ELECTROLUBE ANTI-STIC

## (undated) DEVICE — CLIP HEMO-LOK MLX LARGE LF

## (undated) DEVICE — CANNULA REDUCER 12-8MM

## (undated) DEVICE — ADHESIVE DERMABOND ADVANCED

## (undated) DEVICE — SEAL CANN UNIVERSAL 5-12MM

## (undated) DEVICE — DRAPE SCOPE PILLOW WARMER

## (undated) DEVICE — TAPE SILK 3IN

## (undated) DEVICE — DRAPE ABDOMINAL TIBURON 14X11

## (undated) DEVICE — COVER LIGHT HANDLE

## (undated) DEVICE — DRAPE ARM DAVINCI XI

## (undated) DEVICE — SUT PROLENE 4-0 RB-1 BL MO

## (undated) DEVICE — SYS SEE SHARP SCP ANTIFG LNG

## (undated) DEVICE — GOWN SURGICAL X-LARGE

## (undated) DEVICE — DRAPE INCISE IOBAN 2 23X17IN

## (undated) DEVICE — SUT MCRYL PLUS 4-0 PS2 27IN

## (undated) DEVICE — SYR SLIP TIP 20CC

## (undated) DEVICE — STAPLER INTERNAL 30X8MM

## (undated) DEVICE — SYR 10CC LUER LOCK

## (undated) DEVICE — TRAY CATH 1-LYR URIMTR 16FR

## (undated) DEVICE — SUT 2/0 27IN PDS II VIO MO

## (undated) DEVICE — ELECTRODE REM PLYHSV RETURN 9

## (undated) DEVICE — SET TRI-LUMEN FILTERED TUBE

## (undated) DEVICE — DRAPE COLUMN DAVINCI XI

## (undated) DEVICE — NDL INSUF ULTRA VERESS 120MM

## (undated) DEVICE — SUT EASE CROSSBOW CLSR SYS

## (undated) DEVICE — IRRIGATOR ENDOSCOPY DISP.